# Patient Record
Sex: FEMALE | Race: BLACK OR AFRICAN AMERICAN | NOT HISPANIC OR LATINO | URBAN - METROPOLITAN AREA
[De-identification: names, ages, dates, MRNs, and addresses within clinical notes are randomized per-mention and may not be internally consistent; named-entity substitution may affect disease eponyms.]

---

## 2018-02-15 ENCOUNTER — EMERGENCY (EMERGENCY)
Facility: HOSPITAL | Age: 43
LOS: 1 days | Discharge: HOME | End: 2018-02-15
Attending: EMERGENCY MEDICINE | Admitting: EMERGENCY MEDICINE
Payer: MEDICAID

## 2018-02-15 VITALS
TEMPERATURE: 98 F | RESPIRATION RATE: 16 BRPM | WEIGHT: 164.91 LBS | OXYGEN SATURATION: 98 % | DIASTOLIC BLOOD PRESSURE: 89 MMHG | SYSTOLIC BLOOD PRESSURE: 152 MMHG | HEART RATE: 88 BPM

## 2018-02-15 DIAGNOSIS — R07.9 CHEST PAIN, UNSPECIFIED: ICD-10-CM

## 2018-02-15 DIAGNOSIS — R51 HEADACHE: ICD-10-CM

## 2018-02-15 DIAGNOSIS — R53.1 WEAKNESS: ICD-10-CM

## 2018-02-15 LAB
ALBUMIN SERPL ELPH-MCNC: 4.2 G/DL — SIGNIFICANT CHANGE UP (ref 3.3–5)
ALP SERPL-CCNC: 66 U/L — SIGNIFICANT CHANGE UP (ref 40–120)
ALT FLD-CCNC: 20 U/L — SIGNIFICANT CHANGE UP (ref 10–45)
ANION GAP SERPL CALC-SCNC: 12 MMOL/L — SIGNIFICANT CHANGE UP (ref 5–17)
APPEARANCE UR: CLEAR — SIGNIFICANT CHANGE UP
AST SERPL-CCNC: 22 U/L — SIGNIFICANT CHANGE UP (ref 10–40)
BASOPHILS NFR BLD AUTO: 0.2 % — SIGNIFICANT CHANGE UP (ref 0–2)
BILIRUB SERPL-MCNC: 0.2 MG/DL — SIGNIFICANT CHANGE UP (ref 0.2–1.2)
BILIRUB UR-MCNC: NEGATIVE — SIGNIFICANT CHANGE UP
BUN SERPL-MCNC: 9 MG/DL — SIGNIFICANT CHANGE UP (ref 7–23)
CALCIUM SERPL-MCNC: 9.5 MG/DL — SIGNIFICANT CHANGE UP (ref 8.4–10.5)
CHLORIDE SERPL-SCNC: 98 MMOL/L — SIGNIFICANT CHANGE UP (ref 96–108)
CO2 SERPL-SCNC: 29 MMOL/L — SIGNIFICANT CHANGE UP (ref 22–31)
COLOR SPEC: YELLOW — SIGNIFICANT CHANGE UP
CREAT SERPL-MCNC: 0.77 MG/DL — SIGNIFICANT CHANGE UP (ref 0.5–1.3)
DIFF PNL FLD: (no result)
EOSINOPHIL NFR BLD AUTO: 1.3 % — SIGNIFICANT CHANGE UP (ref 0–6)
GLUCOSE SERPL-MCNC: 254 MG/DL — HIGH (ref 70–99)
GLUCOSE UR QL: 500
HCT VFR BLD CALC: 36.1 % — SIGNIFICANT CHANGE UP (ref 34.5–45)
HGB BLD-MCNC: 11.8 G/DL — SIGNIFICANT CHANGE UP (ref 11.5–15.5)
KETONES UR-MCNC: NEGATIVE — SIGNIFICANT CHANGE UP
LEUKOCYTE ESTERASE UR-ACNC: NEGATIVE — SIGNIFICANT CHANGE UP
LIDOCAIN IGE QN: 31 U/L — SIGNIFICANT CHANGE UP (ref 7–60)
LYMPHOCYTES # BLD AUTO: 43.7 % — SIGNIFICANT CHANGE UP (ref 13–44)
MCHC RBC-ENTMCNC: 28.5 PG — SIGNIFICANT CHANGE UP (ref 27–34)
MCHC RBC-ENTMCNC: 32.7 G/DL — SIGNIFICANT CHANGE UP (ref 32–36)
MCV RBC AUTO: 87.2 FL — SIGNIFICANT CHANGE UP (ref 80–100)
MONOCYTES NFR BLD AUTO: 7.8 % — SIGNIFICANT CHANGE UP (ref 2–14)
NEUTROPHILS NFR BLD AUTO: 47 % — SIGNIFICANT CHANGE UP (ref 43–77)
NITRITE UR-MCNC: NEGATIVE — SIGNIFICANT CHANGE UP
PH UR: 5.5 — SIGNIFICANT CHANGE UP (ref 5–8)
PLATELET # BLD AUTO: 247 K/UL — SIGNIFICANT CHANGE UP (ref 150–400)
POTASSIUM SERPL-MCNC: 4.4 MMOL/L — SIGNIFICANT CHANGE UP (ref 3.5–5.3)
POTASSIUM SERPL-SCNC: 4.4 MMOL/L — SIGNIFICANT CHANGE UP (ref 3.5–5.3)
PROT SERPL-MCNC: 7.9 G/DL — SIGNIFICANT CHANGE UP (ref 6–8.3)
PROT UR-MCNC: NEGATIVE MG/DL — SIGNIFICANT CHANGE UP
RBC # BLD: 4.14 M/UL — SIGNIFICANT CHANGE UP (ref 3.8–5.2)
RBC # FLD: 12.6 % — SIGNIFICANT CHANGE UP (ref 10.3–16.9)
SODIUM SERPL-SCNC: 139 MMOL/L — SIGNIFICANT CHANGE UP (ref 135–145)
SP GR SPEC: 1.02 — SIGNIFICANT CHANGE UP (ref 1–1.03)
UROBILINOGEN FLD QL: 0.2 E.U./DL — SIGNIFICANT CHANGE UP
WBC # BLD: 6.3 K/UL — SIGNIFICANT CHANGE UP (ref 3.8–10.5)
WBC # FLD AUTO: 6.3 K/UL — SIGNIFICANT CHANGE UP (ref 3.8–10.5)

## 2018-02-15 PROCEDURE — 71046 X-RAY EXAM CHEST 2 VIEWS: CPT | Mod: 26

## 2018-02-15 PROCEDURE — 70450 CT HEAD/BRAIN W/O DYE: CPT | Mod: 26

## 2018-02-15 PROCEDURE — 99285 EMERGENCY DEPT VISIT HI MDM: CPT

## 2018-02-15 NOTE — ED PROVIDER NOTE - CHPI ED SYMPTOMS NEG
no back pain/no diaphoresis/no shortness of breath/no nausea/no syncope/no chills/no chest pain/no vomiting/no dizziness/no fever/no cough

## 2018-02-15 NOTE — ED PROVIDER NOTE - MEDICAL DECISION MAKING DETAILS
s/s as above, hds, work up reviewed, bsus w pericardial effusion s/s as above pt states for one year, hds, work up reviewed, bsus w no pericardial effusion given enlarged size. no sx during ED stay. Less likely  acs/dissection/pe, labs wnl. less likely infectious process. Discussed with pt results of work up, strict return precautions, and need for follow up. Pt states she can fu w pcp.  Pt expressed understanding and agrees with plan.

## 2018-02-15 NOTE — ED PROVIDER NOTE - OBJECTIVE STATEMENT
43 yo hx of DM w complaints of 1 year duration of headache, abnormal  sensation to chest that starts in abdomen w generalized weakness. Comes in discrete episodes, felt as if about to syncopize earlier today. No trauma to head, n/v/f/c, cough. 41 yo hx of DM w complaints of 1 year duration of headache, abnormal  sensation to chest that starts in abdomen that goes to chest w generalized weakness. Comes in discrete episodes, felt as if about to syncopize earlier today. No trauma to head, n/v/f/c, cough. Pt has not tried anything for symptoms, no other aggravating or relieving factors. has no sx at present.

## 2018-02-15 NOTE — ED ADULT NURSE NOTE - OBJECTIVE STATEMENT
pt received in [facility] x 3 pt c/o headaches and dizziness for about a year , abdominal pain , no nausea or vomiting

## 2018-02-15 NOTE — ED ADULT TRIAGE NOTE - CHIEF COMPLAINT QUOTE
biba from home c/o intermittent headache, dizziness, "burning feeling in stomach going up her throat" x 4 days.  Hx Gerd, migraine.  EKG in progress.  Patient denies falls / injury, fever, chills, neck stiffness, n/v/d

## 2018-02-26 ENCOUNTER — APPOINTMENT (OUTPATIENT)
Dept: HEART AND VASCULAR | Facility: CLINIC | Age: 43
End: 2018-02-26
Payer: MEDICAID

## 2018-02-26 VITALS
BODY MASS INDEX: 42.32 KG/M2 | HEIGHT: 65 IN | SYSTOLIC BLOOD PRESSURE: 128 MMHG | HEART RATE: 100 BPM | DIASTOLIC BLOOD PRESSURE: 80 MMHG | WEIGHT: 254 LBS | OXYGEN SATURATION: 98 %

## 2018-02-26 DIAGNOSIS — E11.65 TYPE 2 DIABETES MELLITUS WITH UNSPECIFIED COMPLICATIONS: ICD-10-CM

## 2018-02-26 DIAGNOSIS — Z82.49 FAMILY HISTORY OF ISCHEMIC HEART DISEASE AND OTHER DISEASES OF THE CIRCULATORY SYSTEM: ICD-10-CM

## 2018-02-26 DIAGNOSIS — Z83.3 FAMILY HISTORY OF DIABETES MELLITUS: ICD-10-CM

## 2018-02-26 DIAGNOSIS — K21.9 GASTRO-ESOPHAGEAL REFLUX DISEASE W/OUT ESOPHAGITIS: ICD-10-CM

## 2018-02-26 DIAGNOSIS — E11.8 TYPE 2 DIABETES MELLITUS WITH UNSPECIFIED COMPLICATIONS: ICD-10-CM

## 2018-02-26 PROCEDURE — 99204 OFFICE O/P NEW MOD 45 MIN: CPT

## 2018-02-27 PROBLEM — E11.8 TYPE II DIABETES MELLITUS WITH MANIFESTATIONS, UNCONTROLLED: Status: RESOLVED | Noted: 2018-02-27 | Resolved: 2018-02-27

## 2018-02-27 PROBLEM — Z82.49 FAMILY HISTORY OF HYPERTENSION: Status: ACTIVE | Noted: 2018-02-26

## 2018-02-27 PROBLEM — K21.9 CHRONIC GERD: Status: RESOLVED | Noted: 2018-02-27 | Resolved: 2018-02-27

## 2018-02-27 PROBLEM — Z83.3 FAMILY HISTORY OF DIABETES MELLITUS: Status: ACTIVE | Noted: 2018-02-26

## 2018-03-12 ENCOUNTER — EMERGENCY (EMERGENCY)
Facility: HOSPITAL | Age: 43
LOS: 1 days | Discharge: HOME | End: 2018-03-12
Attending: EMERGENCY MEDICINE | Admitting: EMERGENCY MEDICINE
Payer: MEDICAID

## 2018-03-12 VITALS
RESPIRATION RATE: 16 BRPM | TEMPERATURE: 98 F | HEART RATE: 90 BPM | SYSTOLIC BLOOD PRESSURE: 152 MMHG | DIASTOLIC BLOOD PRESSURE: 106 MMHG | OXYGEN SATURATION: 97 %

## 2018-03-12 LAB
GRAM STN FLD: SIGNIFICANT CHANGE UP
SPECIMEN SOURCE: SIGNIFICANT CHANGE UP

## 2018-03-12 PROCEDURE — 99284 EMERGENCY DEPT VISIT MOD MDM: CPT

## 2018-03-12 RX ORDER — AZTREONAM 2 G
1 VIAL (EA) INJECTION
Qty: 19 | Refills: 0 | OUTPATIENT
Start: 2018-03-12 | End: 2018-03-21

## 2018-03-12 RX ORDER — AZTREONAM 2 G
1 VIAL (EA) INJECTION
Qty: 20 | Refills: 0 | OUTPATIENT
Start: 2018-03-12 | End: 2018-03-21

## 2018-03-12 RX ADMIN — Medication 1 TABLET(S): at 10:51

## 2018-03-12 NOTE — ED ADULT NURSE NOTE - OBJECTIVE STATEMENT
Patient BIBA, AAOx3, in NAD, hypertensive, complaining of wound to LUQ of abdomen with serosanguinous drainage.  Patient states she saw her PMD and started PO Cephalexin on 3/6/18.  Patient denies fevers, chills, N/V or any other complaints.  Will continue with plan of care.

## 2018-03-12 NOTE — ED PROVIDER NOTE - ATTENDING CONTRIBUTION TO CARE
Agree with documentation by Dr. Silva, 44 y/o F w/NIDDM, abdominal skin abscess treated unsuccessfully with keflex on day 7 of 10, spontaneously draining w/improvement in pain and size. HEENT wnl, MMM, CTA b/l, RRR, NTND, ext exam wnl, + 4x4cm indurated mid-epigastric abscess spontaneously draining, ultrasounded w/out significant pocket of fluid, will forego further I&D. Starting on bactrim for MRSA coverage, has f/u with PMD on 3/19, given return precautions.

## 2018-03-12 NOTE — ED PROVIDER NOTE - PLAN OF CARE
With purulent drainage; u/s w/o indication of abscess; will broaden to bactrim DS [id number] With purulent drainage; u/s w/o indication of abscess; will broaden to bactrim DS bid [id number]

## 2018-03-12 NOTE — ED PROVIDER NOTE - OBJECTIVE STATEMENT
Patient is a 44yo F w/ PMH DM2 presenting w/ draining purulent abdominal skin rash. Pt reports visiting her PMD for this rash on 3/6/16, and being started on a 10day course of cephalexin 500mg bid. Patient reports swelling has improved since starting abx, however today the rash was significantly more painful (5/10) and w/ yellow-reddish drainage. Denies f/c/n/v/d/c, CP, dyspnea, ab pain, dysuria.

## 2018-03-12 NOTE — ED ADULT TRIAGE NOTE - CHIEF COMPLAINT QUOTE
Pt BIBA from home c/o skin ulcer to RUQ of abdomen, on antibiotics, but states it's red and has purulent drainage. Denies fever.

## 2018-03-12 NOTE — ED PROVIDER NOTE - CARE PLAN
Principal Discharge DX:	Cellulitis of other specified site  Assessment and plan of treatment:	With purulent drainage; u/s w/o indication of abscess; will broaden to bactrim DS [id number] Principal Discharge DX:	Cellulitis of other specified site  Assessment and plan of treatment:	With purulent drainage; u/s w/o indication of abscess; will broaden to bactrim DS bid [id number]

## 2018-03-12 NOTE — ED PROVIDER NOTE - MEDICAL DECISION MAKING DETAILS
Patient is a 44yo F w/ PMH DM2 presenting with abdominal cellulitis w/ purulent drainage; ROS otherwise negative; u/s w/ no discrete abscess identified; broadened pt to bactrim DS x10 days Patient is a 42yo F w/ PMH DM2 presenting with abdominal cellulitis w/ purulent drainage; ROS otherwise negative; u/s w/ no discrete abscess identified; broadened pt to bactrim DS bid x10 days

## 2018-03-15 LAB
CULTURE RESULTS: NO GROWTH — SIGNIFICANT CHANGE UP
SPECIMEN SOURCE: SIGNIFICANT CHANGE UP

## 2018-03-16 DIAGNOSIS — R21 RASH AND OTHER NONSPECIFIC SKIN ERUPTION: ICD-10-CM

## 2018-03-16 DIAGNOSIS — E11.9 TYPE 2 DIABETES MELLITUS WITHOUT COMPLICATIONS: ICD-10-CM

## 2018-03-16 DIAGNOSIS — L03.311 CELLULITIS OF ABDOMINAL WALL: ICD-10-CM

## 2018-03-22 ENCOUNTER — APPOINTMENT (OUTPATIENT)
Dept: HEART AND VASCULAR | Facility: CLINIC | Age: 43
End: 2018-03-22
Payer: MEDICAID

## 2018-03-22 VITALS
DIASTOLIC BLOOD PRESSURE: 68 MMHG | SYSTOLIC BLOOD PRESSURE: 102 MMHG | HEIGHT: 65 IN | OXYGEN SATURATION: 97 % | HEART RATE: 94 BPM | WEIGHT: 254 LBS | BODY MASS INDEX: 42.32 KG/M2

## 2018-03-22 PROCEDURE — 93306 TTE W/DOPPLER COMPLETE: CPT

## 2018-03-22 PROCEDURE — 99214 OFFICE O/P EST MOD 30 MIN: CPT

## 2018-04-16 ENCOUNTER — APPOINTMENT (OUTPATIENT)
Dept: HEART AND VASCULAR | Facility: CLINIC | Age: 43
End: 2018-04-16
Payer: MEDICAID

## 2018-04-16 VITALS
BODY MASS INDEX: 42.32 KG/M2 | DIASTOLIC BLOOD PRESSURE: 80 MMHG | SYSTOLIC BLOOD PRESSURE: 118 MMHG | HEART RATE: 85 BPM | WEIGHT: 254 LBS | HEIGHT: 65 IN

## 2018-04-16 DIAGNOSIS — R00.2 PALPITATIONS: ICD-10-CM

## 2018-04-16 PROCEDURE — 93320 DOPPLER ECHO COMPLETE: CPT

## 2018-04-16 PROCEDURE — 99214 OFFICE O/P EST MOD 30 MIN: CPT

## 2018-04-16 PROCEDURE — 93325 DOPPLER ECHO COLOR FLOW MAPG: CPT

## 2018-04-16 PROCEDURE — 93351 STRESS TTE COMPLETE: CPT

## 2018-06-01 ENCOUNTER — EMERGENCY (EMERGENCY)
Facility: HOSPITAL | Age: 43
LOS: 1 days | Discharge: HOME | End: 2018-06-01
Attending: EMERGENCY MEDICINE | Admitting: EMERGENCY MEDICINE
Payer: MEDICAID

## 2018-06-01 VITALS
OXYGEN SATURATION: 97 % | SYSTOLIC BLOOD PRESSURE: 125 MMHG | HEART RATE: 97 BPM | TEMPERATURE: 98 F | DIASTOLIC BLOOD PRESSURE: 85 MMHG | RESPIRATION RATE: 18 BRPM

## 2018-06-01 VITALS
DIASTOLIC BLOOD PRESSURE: 83 MMHG | RESPIRATION RATE: 18 BRPM | TEMPERATURE: 99 F | HEART RATE: 77 BPM | SYSTOLIC BLOOD PRESSURE: 124 MMHG | OXYGEN SATURATION: 100 %

## 2018-06-01 DIAGNOSIS — Z90.49 ACQUIRED ABSENCE OF OTHER SPECIFIED PARTS OF DIGESTIVE TRACT: Chronic | ICD-10-CM

## 2018-06-01 LAB
ALBUMIN SERPL ELPH-MCNC: 4.1 G/DL — SIGNIFICANT CHANGE UP (ref 3.3–5)
ALP SERPL-CCNC: 65 U/L — SIGNIFICANT CHANGE UP (ref 40–120)
ALT FLD-CCNC: 18 U/L — SIGNIFICANT CHANGE UP (ref 10–45)
ANION GAP SERPL CALC-SCNC: 13 MMOL/L — SIGNIFICANT CHANGE UP (ref 5–17)
APTT BLD: 32.5 SEC — SIGNIFICANT CHANGE UP (ref 27.5–37.4)
AST SERPL-CCNC: 17 U/L — SIGNIFICANT CHANGE UP (ref 10–40)
BASOPHILS NFR BLD AUTO: 0.2 % — SIGNIFICANT CHANGE UP (ref 0–2)
BILIRUB SERPL-MCNC: 0.2 MG/DL — SIGNIFICANT CHANGE UP (ref 0.2–1.2)
BUN SERPL-MCNC: 8 MG/DL — SIGNIFICANT CHANGE UP (ref 7–23)
CALCIUM SERPL-MCNC: 9.5 MG/DL — SIGNIFICANT CHANGE UP (ref 8.4–10.5)
CHLORIDE SERPL-SCNC: 95 MMOL/L — LOW (ref 96–108)
CO2 SERPL-SCNC: 28 MMOL/L — SIGNIFICANT CHANGE UP (ref 22–31)
CREAT SERPL-MCNC: 0.61 MG/DL — SIGNIFICANT CHANGE UP (ref 0.5–1.3)
EOSINOPHIL NFR BLD AUTO: 1.2 % — SIGNIFICANT CHANGE UP (ref 0–6)
GLUCOSE SERPL-MCNC: 227 MG/DL — HIGH (ref 70–99)
HCG SERPL-ACNC: <.1 MIU/ML — SIGNIFICANT CHANGE UP
HCT VFR BLD CALC: 37.6 % — SIGNIFICANT CHANGE UP (ref 34.5–45)
HGB BLD-MCNC: 12.2 G/DL — SIGNIFICANT CHANGE UP (ref 11.5–15.5)
INR BLD: 1 — SIGNIFICANT CHANGE UP (ref 0.88–1.16)
LYMPHOCYTES # BLD AUTO: 34.8 % — SIGNIFICANT CHANGE UP (ref 13–44)
MAGNESIUM SERPL-MCNC: 1.9 MG/DL — SIGNIFICANT CHANGE UP (ref 1.6–2.6)
MCHC RBC-ENTMCNC: 28.3 PG — SIGNIFICANT CHANGE UP (ref 27–34)
MCHC RBC-ENTMCNC: 32.4 G/DL — SIGNIFICANT CHANGE UP (ref 32–36)
MCV RBC AUTO: 87.2 FL — SIGNIFICANT CHANGE UP (ref 80–100)
MONOCYTES NFR BLD AUTO: 8.3 % — SIGNIFICANT CHANGE UP (ref 2–14)
NEUTROPHILS NFR BLD AUTO: 55.5 % — SIGNIFICANT CHANGE UP (ref 43–77)
PLATELET # BLD AUTO: 222 K/UL — SIGNIFICANT CHANGE UP (ref 150–400)
POTASSIUM SERPL-MCNC: 4.3 MMOL/L — SIGNIFICANT CHANGE UP (ref 3.5–5.3)
POTASSIUM SERPL-SCNC: 4.3 MMOL/L — SIGNIFICANT CHANGE UP (ref 3.5–5.3)
PROT SERPL-MCNC: 7.9 G/DL — SIGNIFICANT CHANGE UP (ref 6–8.3)
PROTHROM AB SERPL-ACNC: 11.1 SEC — SIGNIFICANT CHANGE UP (ref 9.8–12.7)
RBC # BLD: 4.31 M/UL — SIGNIFICANT CHANGE UP (ref 3.8–5.2)
RBC # FLD: 12.3 % — SIGNIFICANT CHANGE UP (ref 10.3–16.9)
SODIUM SERPL-SCNC: 136 MMOL/L — SIGNIFICANT CHANGE UP (ref 135–145)
WBC # BLD: 6.5 K/UL — SIGNIFICANT CHANGE UP (ref 3.8–10.5)
WBC # FLD AUTO: 6.5 K/UL — SIGNIFICANT CHANGE UP (ref 3.8–10.5)

## 2018-06-01 PROCEDURE — 99284 EMERGENCY DEPT VISIT MOD MDM: CPT | Mod: 25

## 2018-06-01 PROCEDURE — 70551 MRI BRAIN STEM W/O DYE: CPT | Mod: 26

## 2018-06-01 RX ORDER — DIPHENHYDRAMINE HCL 50 MG
25 CAPSULE ORAL ONCE
Qty: 0 | Refills: 0 | Status: DISCONTINUED | OUTPATIENT
Start: 2018-06-01 | End: 2018-06-01

## 2018-06-01 RX ORDER — METOCLOPRAMIDE HCL 10 MG
10 TABLET ORAL ONCE
Qty: 0 | Refills: 0 | Status: DISCONTINUED | OUTPATIENT
Start: 2018-06-01 | End: 2018-06-01

## 2018-06-01 RX ORDER — KETOROLAC TROMETHAMINE 30 MG/ML
30 SYRINGE (ML) INJECTION ONCE
Qty: 0 | Refills: 0 | Status: DISCONTINUED | OUTPATIENT
Start: 2018-06-01 | End: 2018-06-01

## 2018-06-01 NOTE — ED ADULT NURSE NOTE - OBJECTIVE STATEMENT
The patient is a 44 y/o female who came in to ED c/o "head pressure" Pt denies headache. Pt c/o tremors. Pt denies nausea, photophobia, weakness or any other complaints.

## 2018-06-01 NOTE — ED ADULT TRIAGE NOTE - ARRIVAL INFO ADDITIONAL COMMENTS
pt c/o headache for last few days.  pt has had intermittent headaches for last several months and is being worked up.  +photophobia.  no n/v.  pt had episode of tremors witnessed by ems.

## 2018-06-01 NOTE — ED PROVIDER NOTE - OBJECTIVE STATEMENT
43F hx DM, c/o head pressure. pt states has had pressure to head for past year intermittently.  no n/v/d. no fevers. occasional dizziness. no chest pain. no SOB.  pt states tonight pressure started to top of her head and then she had tremors to her face and then her arms. states she was awake the whole time but could not control it.  states lasted ~2mins.  no vision changes.  no numbness/weakness. states has had CTs in past that have been normal.  states she is waiting for [facility] approval to see a neurologist.

## 2018-06-01 NOTE — ED PROVIDER NOTE - MEDICAL DECISION MAKING DETAILS
HA, no focal neuro deficits currently, tremor earlier with pressure, has had negative CT in past, pt concerned  -check labs, MRI. pt does not wish to take any medication currently HA, no focal neuro deficits currently, tremor earlier with pressure, has had negative CT in past, pt concerned  -check labs, MRI. pt does not wish to take any medication currently    [address] - Pt reassessed after MRI and feeling improved. Reviewed MRI results with patient and there is no evidence of acute intracranial abnormality. Pt provided with copies of results. Will f/u with Neurology and numbers provided. Strict return precautions given.

## 2018-06-05 DIAGNOSIS — E11.9 TYPE 2 DIABETES MELLITUS WITHOUT COMPLICATIONS: ICD-10-CM

## 2018-06-05 DIAGNOSIS — R51 HEADACHE: ICD-10-CM

## 2018-06-05 DIAGNOSIS — Z88.0 ALLERGY STATUS TO PENICILLIN: ICD-10-CM

## 2018-06-05 DIAGNOSIS — Z79.2 LONG TERM (CURRENT) USE OF ANTIBIOTICS: ICD-10-CM

## 2018-07-13 ENCOUNTER — APPOINTMENT (OUTPATIENT)
Dept: NEUROLOGY | Facility: CLINIC | Age: 43
End: 2018-07-13
Payer: MEDICAID

## 2018-07-13 VITALS
DIASTOLIC BLOOD PRESSURE: 84 MMHG | BODY MASS INDEX: 41.65 KG/M2 | HEIGHT: 65 IN | OXYGEN SATURATION: 96 % | WEIGHT: 250 LBS | TEMPERATURE: 98.5 F | HEART RATE: 85 BPM | SYSTOLIC BLOOD PRESSURE: 127 MMHG

## 2018-07-13 PROCEDURE — 99205 OFFICE O/P NEW HI 60 MIN: CPT

## 2018-07-22 ENCOUNTER — EMERGENCY (EMERGENCY)
Facility: HOSPITAL | Age: 43
LOS: 1 days | Discharge: HOME | End: 2018-07-22
Attending: EMERGENCY MEDICINE | Admitting: EMERGENCY MEDICINE
Payer: MEDICAID

## 2018-07-22 VITALS
RESPIRATION RATE: 18 BRPM | HEART RATE: 74 BPM | OXYGEN SATURATION: 100 % | SYSTOLIC BLOOD PRESSURE: 142 MMHG | DIASTOLIC BLOOD PRESSURE: 84 MMHG | TEMPERATURE: 98 F

## 2018-07-22 VITALS
RESPIRATION RATE: 18 BRPM | DIASTOLIC BLOOD PRESSURE: 92 MMHG | HEART RATE: 76 BPM | TEMPERATURE: 98 F | SYSTOLIC BLOOD PRESSURE: 167 MMHG | OXYGEN SATURATION: 99 %

## 2018-07-22 DIAGNOSIS — Z90.49 ACQUIRED ABSENCE OF OTHER SPECIFIED PARTS OF DIGESTIVE TRACT: Chronic | ICD-10-CM

## 2018-07-22 LAB
ALBUMIN SERPL ELPH-MCNC: 4 G/DL — SIGNIFICANT CHANGE UP (ref 3.3–5)
ALP SERPL-CCNC: 67 U/L — SIGNIFICANT CHANGE UP (ref 40–120)
ALT FLD-CCNC: 20 U/L — SIGNIFICANT CHANGE UP (ref 10–45)
ANION GAP SERPL CALC-SCNC: 13 MMOL/L — SIGNIFICANT CHANGE UP (ref 5–17)
APTT BLD: 29.4 SEC — SIGNIFICANT CHANGE UP (ref 27.5–37.4)
AST SERPL-CCNC: 20 U/L — SIGNIFICANT CHANGE UP (ref 10–40)
BASOPHILS NFR BLD AUTO: 0.2 % — SIGNIFICANT CHANGE UP (ref 0–2)
BILIRUB SERPL-MCNC: 0.3 MG/DL — SIGNIFICANT CHANGE UP (ref 0.2–1.2)
BUN SERPL-MCNC: 7 MG/DL — SIGNIFICANT CHANGE UP (ref 7–23)
CALCIUM SERPL-MCNC: 9 MG/DL — SIGNIFICANT CHANGE UP (ref 8.4–10.5)
CHLORIDE SERPL-SCNC: 99 MMOL/L — SIGNIFICANT CHANGE UP (ref 96–108)
CHOLEST SERPL-MCNC: 182 MG/DL — SIGNIFICANT CHANGE UP (ref 10–199)
CO2 SERPL-SCNC: 26 MMOL/L — SIGNIFICANT CHANGE UP (ref 22–31)
CREAT SERPL-MCNC: 0.61 MG/DL — SIGNIFICANT CHANGE UP (ref 0.5–1.3)
EOSINOPHIL NFR BLD AUTO: 2.1 % — SIGNIFICANT CHANGE UP (ref 0–6)
GLUCOSE SERPL-MCNC: 169 MG/DL — HIGH (ref 70–99)
HCT VFR BLD CALC: 39.5 % — SIGNIFICANT CHANGE UP (ref 34.5–45)
HDLC SERPL-MCNC: 46 MG/DL — SIGNIFICANT CHANGE UP (ref 40–125)
HGB BLD-MCNC: 12.7 G/DL — SIGNIFICANT CHANGE UP (ref 11.5–15.5)
INR BLD: 1.01 — SIGNIFICANT CHANGE UP (ref 0.88–1.16)
LIPID PNL WITH DIRECT LDL SERPL: 111 MG/DL — SIGNIFICANT CHANGE UP
LYMPHOCYTES # BLD AUTO: 51.8 % — HIGH (ref 13–44)
MCHC RBC-ENTMCNC: 28.5 PG — SIGNIFICANT CHANGE UP (ref 27–34)
MCHC RBC-ENTMCNC: 32.2 G/DL — SIGNIFICANT CHANGE UP (ref 32–36)
MCV RBC AUTO: 88.8 FL — SIGNIFICANT CHANGE UP (ref 80–100)
MONOCYTES NFR BLD AUTO: 8.2 % — SIGNIFICANT CHANGE UP (ref 2–14)
NEUTROPHILS NFR BLD AUTO: 37.7 % — LOW (ref 43–77)
PLATELET # BLD AUTO: 214 K/UL — SIGNIFICANT CHANGE UP (ref 150–400)
POTASSIUM SERPL-MCNC: 4 MMOL/L — SIGNIFICANT CHANGE UP (ref 3.5–5.3)
POTASSIUM SERPL-SCNC: 4 MMOL/L — SIGNIFICANT CHANGE UP (ref 3.5–5.3)
PROT SERPL-MCNC: 8 G/DL — SIGNIFICANT CHANGE UP (ref 6–8.3)
PROTHROM AB SERPL-ACNC: 11.2 SEC — SIGNIFICANT CHANGE UP (ref 9.8–12.7)
RBC # BLD: 4.45 M/UL — SIGNIFICANT CHANGE UP (ref 3.8–5.2)
RBC # FLD: 12.6 % — SIGNIFICANT CHANGE UP (ref 10.3–16.9)
SODIUM SERPL-SCNC: 138 MMOL/L — SIGNIFICANT CHANGE UP (ref 135–145)
TOTAL CHOLESTEROL/HDL RATIO MEASUREMENT: 4 RATIO — SIGNIFICANT CHANGE UP (ref 3.3–7.1)
TRIGL SERPL-MCNC: 124 MG/DL — SIGNIFICANT CHANGE UP (ref 10–149)
WBC # BLD: 5.4 K/UL — SIGNIFICANT CHANGE UP (ref 3.8–10.5)
WBC # FLD AUTO: 5.4 K/UL — SIGNIFICANT CHANGE UP (ref 3.8–10.5)

## 2018-07-22 PROCEDURE — 80053 COMPREHEN METABOLIC PANEL: CPT

## 2018-07-22 PROCEDURE — 70498 CT ANGIOGRAPHY NECK: CPT | Mod: 26

## 2018-07-22 PROCEDURE — 70498 CT ANGIOGRAPHY NECK: CPT

## 2018-07-22 PROCEDURE — 82962 GLUCOSE BLOOD TEST: CPT

## 2018-07-22 PROCEDURE — 80061 LIPID PANEL: CPT

## 2018-07-22 PROCEDURE — 70496 CT ANGIOGRAPHY HEAD: CPT | Mod: 26

## 2018-07-22 PROCEDURE — 99291 CRITICAL CARE FIRST HOUR: CPT

## 2018-07-22 PROCEDURE — 99291 CRITICAL CARE FIRST HOUR: CPT | Mod: 25

## 2018-07-22 PROCEDURE — 85610 PROTHROMBIN TIME: CPT

## 2018-07-22 PROCEDURE — 0042T: CPT

## 2018-07-22 PROCEDURE — 36415 COLL VENOUS BLD VENIPUNCTURE: CPT

## 2018-07-22 PROCEDURE — 71045 X-RAY EXAM CHEST 1 VIEW: CPT | Mod: 26

## 2018-07-22 PROCEDURE — 85025 COMPLETE CBC W/AUTO DIFF WBC: CPT

## 2018-07-22 PROCEDURE — 85730 THROMBOPLASTIN TIME PARTIAL: CPT

## 2018-07-22 PROCEDURE — 70450 CT HEAD/BRAIN W/O DYE: CPT | Mod: 26,59

## 2018-07-22 PROCEDURE — 70496 CT ANGIOGRAPHY HEAD: CPT

## 2018-07-22 PROCEDURE — 93010 ELECTROCARDIOGRAM REPORT: CPT

## 2018-07-22 PROCEDURE — 70450 CT HEAD/BRAIN W/O DYE: CPT

## 2018-07-22 PROCEDURE — 71045 X-RAY EXAM CHEST 1 VIEW: CPT

## 2018-07-22 PROCEDURE — 93005 ELECTROCARDIOGRAM TRACING: CPT

## 2018-07-22 NOTE — ED ADULT NURSE REASSESSMENT NOTE - NS ED NURSE REASSESS COMMENT FT1
Patient resting comfortably in stretcher, states symptoms are improving at this time. On cardiac monitor, VSS.  passed dysphagia screen.

## 2018-07-22 NOTE — CONSULT NOTE ADULT - ASSESSMENT
#Stroke Code  -NIHSS 1  -Last normal: 1:40PM  -CT, CTA, perfusion study: unremarkable  -Pt is scheduled to have outpatient EEG monitoring  -Clinical, neurological and radiographic presentation isn't consistent with acute CVA. No TPA given.

## 2018-07-22 NOTE — ED ADULT NURSE NOTE - CHPI ED SYMPTOMS NEG
no weakness/no change in level of consciousness/no vomiting/no confusion/no dizziness/no fever/no loss of consciousness/no nausea/no numbness/no blurred vision

## 2018-07-22 NOTE — ED PROVIDER NOTE - MEDICAL DECISION MAKING DETAILS
pt with left facial paresthesia/heaviness starting at 1:30 today, code stroke called in ED - CT head, cta done without focal findings, seen by stroke who states pt may be discharged with outpt follow up with Dr. Bose.  Pt symptoms improved in ED.  Labs, xray, ekg otherwise wnl.

## 2018-07-22 NOTE — ED ADULT NURSE NOTE - OBJECTIVE STATEMENT
Patient presents to the ED with left sided facial "Heaviness" and left arm tingling that started around 1340 this afternoon.  denies and head pain, dizziness.   States she has had similar symptoms a little while ago and followed up with neurology, however today it is different.  AA&OX3, respirations unlabored, non-diaphoretic, no pallor.  no facial droop, no weakness in extremities, steady gait.  Stroke code initiated at 1420

## 2018-07-22 NOTE — ED ADULT TRIAGE NOTE - OTHER COMPLAINTS
Pt also reports that pt was feeling dizzy around 10AM this morning and it resolved without treatment. As per pt, onset of heaviness and tingling sensation was 13:40PM. Pt is A&O x3, able to speak coherently, no facial droop, no arm drift.

## 2018-07-22 NOTE — ED PROVIDER NOTE - CRITICAL CARE PROVIDED
telephone consultation with the patient's family/consultation with other physicians/conducted a detailed discussion of DNR status/additional history taking/interpretation of diagnostic studies/direct patient care (not related to procedure)/documentation/consult w/ pt's family directly relating to pts condition

## 2018-07-22 NOTE — ED PROVIDER NOTE - OBJECTIVE STATEMENT
42 y/o f with pmh of dm on metformin presents with left sided facial heaviness/numbness and left arm numbness starting at 1:30 today.  Complains of slight headache.  Denies cp or dyspnea.  No leg involvement.  Pt seen in ED one month prior for neurological complaints including b/l leg numbness and had MRI which showed no acute abnormality.  Pt followed up with Dr. Bose one week prior and was scheduled for EEG.

## 2018-07-26 DIAGNOSIS — Z79.899 OTHER LONG TERM (CURRENT) DRUG THERAPY: ICD-10-CM

## 2018-07-26 DIAGNOSIS — R29.701 NIHSS SCORE 1: ICD-10-CM

## 2018-07-26 DIAGNOSIS — Z88.0 ALLERGY STATUS TO PENICILLIN: ICD-10-CM

## 2018-07-26 DIAGNOSIS — R20.2 PARESTHESIA OF SKIN: ICD-10-CM

## 2018-07-26 DIAGNOSIS — E11.9 TYPE 2 DIABETES MELLITUS WITHOUT COMPLICATIONS: ICD-10-CM

## 2018-07-26 DIAGNOSIS — Z87.891 PERSONAL HISTORY OF NICOTINE DEPENDENCE: ICD-10-CM

## 2018-07-26 DIAGNOSIS — R20.0 ANESTHESIA OF SKIN: ICD-10-CM

## 2018-07-30 ENCOUNTER — EMERGENCY (EMERGENCY)
Facility: HOSPITAL | Age: 43
LOS: 1 days | Discharge: HOME | End: 2018-07-30
Attending: EMERGENCY MEDICINE | Admitting: EMERGENCY MEDICINE
Payer: MEDICAID

## 2018-07-30 VITALS
DIASTOLIC BLOOD PRESSURE: 77 MMHG | TEMPERATURE: 98 F | SYSTOLIC BLOOD PRESSURE: 124 MMHG | RESPIRATION RATE: 19 BRPM | OXYGEN SATURATION: 98 % | HEART RATE: 69 BPM

## 2018-07-30 VITALS
DIASTOLIC BLOOD PRESSURE: 81 MMHG | SYSTOLIC BLOOD PRESSURE: 128 MMHG | RESPIRATION RATE: 18 BRPM | HEART RATE: 78 BPM | TEMPERATURE: 98 F | OXYGEN SATURATION: 98 %

## 2018-07-30 DIAGNOSIS — Z88.0 ALLERGY STATUS TO PENICILLIN: ICD-10-CM

## 2018-07-30 DIAGNOSIS — Z79.899 OTHER LONG TERM (CURRENT) DRUG THERAPY: ICD-10-CM

## 2018-07-30 DIAGNOSIS — Z90.49 ACQUIRED ABSENCE OF OTHER SPECIFIED PARTS OF DIGESTIVE TRACT: Chronic | ICD-10-CM

## 2018-07-30 DIAGNOSIS — R10.13 EPIGASTRIC PAIN: ICD-10-CM

## 2018-07-30 LAB
ALBUMIN SERPL ELPH-MCNC: 4.2 G/DL — SIGNIFICANT CHANGE UP (ref 3.3–5)
ALP SERPL-CCNC: 64 U/L — SIGNIFICANT CHANGE UP (ref 40–120)
ALT FLD-CCNC: 16 U/L — SIGNIFICANT CHANGE UP (ref 10–45)
ANION GAP SERPL CALC-SCNC: 13 MMOL/L — SIGNIFICANT CHANGE UP (ref 5–17)
APPEARANCE UR: CLEAR — SIGNIFICANT CHANGE UP
AST SERPL-CCNC: 16 U/L — SIGNIFICANT CHANGE UP (ref 10–40)
BASOPHILS NFR BLD AUTO: 0.2 % — SIGNIFICANT CHANGE UP (ref 0–2)
BILIRUB SERPL-MCNC: 0.4 MG/DL — SIGNIFICANT CHANGE UP (ref 0.2–1.2)
BILIRUB UR-MCNC: NEGATIVE — SIGNIFICANT CHANGE UP
BUN SERPL-MCNC: 9 MG/DL — SIGNIFICANT CHANGE UP (ref 7–23)
CALCIUM SERPL-MCNC: 9.8 MG/DL — SIGNIFICANT CHANGE UP (ref 8.4–10.5)
CHLORIDE SERPL-SCNC: 95 MMOL/L — LOW (ref 96–108)
CO2 SERPL-SCNC: 28 MMOL/L — SIGNIFICANT CHANGE UP (ref 22–31)
COLOR SPEC: YELLOW — SIGNIFICANT CHANGE UP
CREAT SERPL-MCNC: 0.69 MG/DL — SIGNIFICANT CHANGE UP (ref 0.5–1.3)
DIFF PNL FLD: NEGATIVE — SIGNIFICANT CHANGE UP
EOSINOPHIL NFR BLD AUTO: 1.8 % — SIGNIFICANT CHANGE UP (ref 0–6)
GLUCOSE SERPL-MCNC: 235 MG/DL — HIGH (ref 70–99)
GLUCOSE UR QL: 100
HCT VFR BLD CALC: 37.4 % — SIGNIFICANT CHANGE UP (ref 34.5–45)
HGB BLD-MCNC: 12 G/DL — SIGNIFICANT CHANGE UP (ref 11.5–15.5)
KETONES UR-MCNC: NEGATIVE — SIGNIFICANT CHANGE UP
LEUKOCYTE ESTERASE UR-ACNC: NEGATIVE — SIGNIFICANT CHANGE UP
LIDOCAIN IGE QN: 25 U/L — SIGNIFICANT CHANGE UP (ref 7–60)
LYMPHOCYTES # BLD AUTO: 37.7 % — SIGNIFICANT CHANGE UP (ref 13–44)
MCHC RBC-ENTMCNC: 27.9 PG — SIGNIFICANT CHANGE UP (ref 27–34)
MCHC RBC-ENTMCNC: 32.1 G/DL — SIGNIFICANT CHANGE UP (ref 32–36)
MCV RBC AUTO: 87 FL — SIGNIFICANT CHANGE UP (ref 80–100)
MONOCYTES NFR BLD AUTO: 7.5 % — SIGNIFICANT CHANGE UP (ref 2–14)
NEUTROPHILS NFR BLD AUTO: 52.8 % — SIGNIFICANT CHANGE UP (ref 43–77)
NITRITE UR-MCNC: NEGATIVE — SIGNIFICANT CHANGE UP
PH UR: 6 — SIGNIFICANT CHANGE UP (ref 5–8)
PLATELET # BLD AUTO: 226 K/UL — SIGNIFICANT CHANGE UP (ref 150–400)
POTASSIUM SERPL-MCNC: 4 MMOL/L — SIGNIFICANT CHANGE UP (ref 3.5–5.3)
POTASSIUM SERPL-SCNC: 4 MMOL/L — SIGNIFICANT CHANGE UP (ref 3.5–5.3)
PROT SERPL-MCNC: 8.2 G/DL — SIGNIFICANT CHANGE UP (ref 6–8.3)
PROT UR-MCNC: NEGATIVE MG/DL — SIGNIFICANT CHANGE UP
RBC # BLD: 4.3 M/UL — SIGNIFICANT CHANGE UP (ref 3.8–5.2)
RBC # FLD: 12.6 % — SIGNIFICANT CHANGE UP (ref 10.3–16.9)
SODIUM SERPL-SCNC: 136 MMOL/L — SIGNIFICANT CHANGE UP (ref 135–145)
SP GR SPEC: 1.02 — SIGNIFICANT CHANGE UP (ref 1–1.03)
UROBILINOGEN FLD QL: 0.2 E.U./DL — SIGNIFICANT CHANGE UP
WBC # BLD: 6.5 K/UL — SIGNIFICANT CHANGE UP (ref 3.8–10.5)
WBC # FLD AUTO: 6.5 K/UL — SIGNIFICANT CHANGE UP (ref 3.8–10.5)

## 2018-07-30 PROCEDURE — 93005 ELECTROCARDIOGRAM TRACING: CPT

## 2018-07-30 PROCEDURE — 99283 EMERGENCY DEPT VISIT LOW MDM: CPT | Mod: 25

## 2018-07-30 PROCEDURE — 84702 CHORIONIC GONADOTROPIN TEST: CPT

## 2018-07-30 PROCEDURE — 85025 COMPLETE CBC W/AUTO DIFF WBC: CPT

## 2018-07-30 PROCEDURE — 83690 ASSAY OF LIPASE: CPT

## 2018-07-30 PROCEDURE — 36415 COLL VENOUS BLD VENIPUNCTURE: CPT

## 2018-07-30 PROCEDURE — 99284 EMERGENCY DEPT VISIT MOD MDM: CPT | Mod: 25

## 2018-07-30 PROCEDURE — 81003 URINALYSIS AUTO W/O SCOPE: CPT

## 2018-07-30 PROCEDURE — 93010 ELECTROCARDIOGRAM REPORT: CPT

## 2018-07-30 PROCEDURE — 80053 COMPREHEN METABOLIC PANEL: CPT

## 2018-07-30 RX ORDER — FAMOTIDINE 10 MG/ML
20 INJECTION INTRAVENOUS ONCE
Qty: 0 | Refills: 0 | Status: COMPLETED | OUTPATIENT
Start: 2018-07-30 | End: 2018-07-30

## 2018-07-30 RX ORDER — LIDOCAINE 4 G/100G
10 CREAM TOPICAL ONCE
Qty: 0 | Refills: 0 | Status: COMPLETED | OUTPATIENT
Start: 2018-07-30 | End: 2018-07-30

## 2018-07-30 RX ADMIN — Medication 30 MILLILITER(S): at 02:25

## 2018-07-30 NOTE — ED PROVIDER NOTE - MEDICAL DECISION MAKING DETAILS
44 yo F with pmh of DM and GERD c/o contractions in stomach that radiate to her head. Pt  states she feels contractions in the epigastrium and then gets a burning sensation in her head. Pt states symptoms have been going on for "a while" and have been getting worse. No n/v, fever, chills or urinary symptoms. VSS. Abd soft, nt, nd. 44 yo F with pmh of DM and GERD c/o contractions in stomach that radiate to her head. Pt  states she feels contractions in the epigastrium and then gets a burning sensation in her head. Pt states symptoms have been going on for "a while" and have been getting worse. No n/v, fever, chills or urinary symptoms. VSS. Abd soft, nt, nd. No concern for acute pathology, ?GERD. Check labs, GI cocktail,  if normal refer for endoscopy

## 2018-07-30 NOTE — ED PROVIDER NOTE - ATTENDING CONTRIBUTION TO CARE
recurrence of abdominal pain radiating up to head.  burning in nature.  seen here before for similar and other workups non diagnostic.  wants to know etiology.  prior imaging of head normal (ct, mri) reviewed.  well appearing, non focal neuro exam, vitals stable.  repeat labs unremarkable.  plan treatment for possible gerd/ gastritis and f/u outpatient

## 2018-07-30 NOTE — ED ADULT NURSE NOTE - OBJECTIVE STATEMENT
pt. presented with c/o abdominal pain, pt. presented with c/o abdominal pain radiating to back, squeezing/cramping in nature, pt. denies n/v/d, fever, chills, difficulty breathing, blood in stool or urine.

## 2018-07-30 NOTE — ED PROVIDER NOTE - OBJECTIVE STATEMENT
44 yo F with pmh of DM and GERD c/o contractions in stomach that radiate to her head. Pt  states she feels contractions in the epigastrium and then gets a burning sensation in her head. Pt states symptoms have been going on for "a while" and have been getting worse. Associated with generalized abd pressure. Pt has had a brain MRI in June that was normal. Pt also seen on 7/22 and had a negative stroke workup. Pt is supposed to have an EEG with neurology and endoscopy. Abd lisseth is worse with eating and associated with epigastric burning. Pt taking pepcid with no relief. Denies fever, chills, n/v, back pain, dysuria, hematuria, dizziness, focal weakness.

## 2018-07-30 NOTE — ED ADULT NURSE NOTE - CHPI ED SYMPTOMS NEG
no diarrhea/no chills/no vomiting/no dysuria/no hematuria/no nausea/no abdominal distension/no fever/no blood in stool/no burning urination

## 2018-08-03 ENCOUNTER — APPOINTMENT (OUTPATIENT)
Dept: NEUROLOGY | Facility: CLINIC | Age: 43
End: 2018-08-03
Payer: MEDICAID

## 2018-08-03 PROCEDURE — 95819 EEG AWAKE AND ASLEEP: CPT

## 2018-08-04 PROCEDURE — 95953: CPT

## 2018-08-05 ENCOUNTER — APPOINTMENT (OUTPATIENT)
Dept: NEUROLOGY | Facility: CLINIC | Age: 43
End: 2018-08-05

## 2018-08-05 PROCEDURE — 95953: CPT

## 2018-08-13 ENCOUNTER — APPOINTMENT (OUTPATIENT)
Dept: HEART AND VASCULAR | Facility: CLINIC | Age: 43
End: 2018-08-13

## 2018-09-28 ENCOUNTER — APPOINTMENT (OUTPATIENT)
Dept: NEUROLOGY | Facility: CLINIC | Age: 43
End: 2018-09-28

## 2018-10-14 ENCOUNTER — EMERGENCY (EMERGENCY)
Facility: HOSPITAL | Age: 43
LOS: 1 days | Discharge: HOME | End: 2018-10-14
Attending: EMERGENCY MEDICINE | Admitting: EMERGENCY MEDICINE
Payer: MEDICAID

## 2018-10-14 VITALS
DIASTOLIC BLOOD PRESSURE: 93 MMHG | TEMPERATURE: 99 F | OXYGEN SATURATION: 98 % | HEART RATE: 82 BPM | HEIGHT: 65 IN | WEIGHT: 244.93 LBS | RESPIRATION RATE: 18 BRPM | SYSTOLIC BLOOD PRESSURE: 146 MMHG

## 2018-10-14 VITALS
TEMPERATURE: 99 F | OXYGEN SATURATION: 99 % | HEART RATE: 81 BPM | SYSTOLIC BLOOD PRESSURE: 140 MMHG | RESPIRATION RATE: 16 BRPM | DIASTOLIC BLOOD PRESSURE: 87 MMHG

## 2018-10-14 DIAGNOSIS — Z90.49 ACQUIRED ABSENCE OF OTHER SPECIFIED PARTS OF DIGESTIVE TRACT: Chronic | ICD-10-CM

## 2018-10-14 LAB
ALBUMIN SERPL ELPH-MCNC: 4.2 G/DL — SIGNIFICANT CHANGE UP (ref 3.3–5)
ALP SERPL-CCNC: 68 U/L — SIGNIFICANT CHANGE UP (ref 40–120)
ALT FLD-CCNC: 20 U/L — SIGNIFICANT CHANGE UP (ref 10–45)
ANION GAP SERPL CALC-SCNC: 11 MMOL/L — SIGNIFICANT CHANGE UP (ref 5–17)
APTT BLD: 31.5 SEC — SIGNIFICANT CHANGE UP (ref 27.5–37.4)
AST SERPL-CCNC: 18 U/L — SIGNIFICANT CHANGE UP (ref 10–40)
BASOPHILS NFR BLD AUTO: 0.1 % — SIGNIFICANT CHANGE UP (ref 0–2)
BILIRUB SERPL-MCNC: 0.3 MG/DL — SIGNIFICANT CHANGE UP (ref 0.2–1.2)
BUN SERPL-MCNC: 8 MG/DL — SIGNIFICANT CHANGE UP (ref 7–23)
CALCIUM SERPL-MCNC: 9.7 MG/DL — SIGNIFICANT CHANGE UP (ref 8.4–10.5)
CHLORIDE SERPL-SCNC: 100 MMOL/L — SIGNIFICANT CHANGE UP (ref 96–108)
CK MB CFR SERPL CALC: <1 NG/ML — SIGNIFICANT CHANGE UP (ref 0–6.7)
CK SERPL-CCNC: 62 U/L — SIGNIFICANT CHANGE UP (ref 25–170)
CO2 SERPL-SCNC: 29 MMOL/L — SIGNIFICANT CHANGE UP (ref 22–31)
CREAT SERPL-MCNC: 0.72 MG/DL — SIGNIFICANT CHANGE UP (ref 0.5–1.3)
EOSINOPHIL NFR BLD AUTO: 1.3 % — SIGNIFICANT CHANGE UP (ref 0–6)
GLUCOSE SERPL-MCNC: 172 MG/DL — HIGH (ref 70–99)
HCT VFR BLD CALC: 37.7 % — SIGNIFICANT CHANGE UP (ref 34.5–45)
HGB BLD-MCNC: 12 G/DL — SIGNIFICANT CHANGE UP (ref 11.5–15.5)
INR BLD: 1.01 — SIGNIFICANT CHANGE UP (ref 0.88–1.16)
LYMPHOCYTES # BLD AUTO: 36.2 % — SIGNIFICANT CHANGE UP (ref 13–44)
MCHC RBC-ENTMCNC: 27.8 PG — SIGNIFICANT CHANGE UP (ref 27–34)
MCHC RBC-ENTMCNC: 31.8 G/DL — LOW (ref 32–36)
MCV RBC AUTO: 87.5 FL — SIGNIFICANT CHANGE UP (ref 80–100)
MONOCYTES NFR BLD AUTO: 7.8 % — SIGNIFICANT CHANGE UP (ref 2–14)
NEUTROPHILS NFR BLD AUTO: 54.6 % — SIGNIFICANT CHANGE UP (ref 43–77)
PLATELET # BLD AUTO: 245 K/UL — SIGNIFICANT CHANGE UP (ref 150–400)
POTASSIUM SERPL-MCNC: 4.5 MMOL/L — SIGNIFICANT CHANGE UP (ref 3.5–5.3)
POTASSIUM SERPL-SCNC: 4.5 MMOL/L — SIGNIFICANT CHANGE UP (ref 3.5–5.3)
PROT SERPL-MCNC: 8.1 G/DL — SIGNIFICANT CHANGE UP (ref 6–8.3)
PROTHROM AB SERPL-ACNC: 11.2 SEC — SIGNIFICANT CHANGE UP (ref 9.8–12.7)
RBC # BLD: 4.31 M/UL — SIGNIFICANT CHANGE UP (ref 3.8–5.2)
RBC # FLD: 12.7 % — SIGNIFICANT CHANGE UP (ref 10.3–16.9)
SODIUM SERPL-SCNC: 140 MMOL/L — SIGNIFICANT CHANGE UP (ref 135–145)
TROPONIN T SERPL-MCNC: <0.01 NG/ML — SIGNIFICANT CHANGE UP (ref 0–0.01)
WBC # BLD: 7 K/UL — SIGNIFICANT CHANGE UP (ref 3.8–10.5)
WBC # FLD AUTO: 7 K/UL — SIGNIFICANT CHANGE UP (ref 3.8–10.5)

## 2018-10-14 PROCEDURE — 84484 ASSAY OF TROPONIN QUANT: CPT

## 2018-10-14 PROCEDURE — 85730 THROMBOPLASTIN TIME PARTIAL: CPT

## 2018-10-14 PROCEDURE — 85025 COMPLETE CBC W/AUTO DIFF WBC: CPT

## 2018-10-14 PROCEDURE — 82553 CREATINE MB FRACTION: CPT

## 2018-10-14 PROCEDURE — 99285 EMERGENCY DEPT VISIT HI MDM: CPT | Mod: 25

## 2018-10-14 PROCEDURE — 85610 PROTHROMBIN TIME: CPT

## 2018-10-14 PROCEDURE — 71046 X-RAY EXAM CHEST 2 VIEWS: CPT

## 2018-10-14 PROCEDURE — 82550 ASSAY OF CK (CPK): CPT

## 2018-10-14 PROCEDURE — 82962 GLUCOSE BLOOD TEST: CPT

## 2018-10-14 PROCEDURE — 99283 EMERGENCY DEPT VISIT LOW MDM: CPT | Mod: 25

## 2018-10-14 PROCEDURE — 83735 ASSAY OF MAGNESIUM: CPT

## 2018-10-14 PROCEDURE — 84443 ASSAY THYROID STIM HORMONE: CPT

## 2018-10-14 PROCEDURE — 80053 COMPREHEN METABOLIC PANEL: CPT

## 2018-10-14 PROCEDURE — 93010 ELECTROCARDIOGRAM REPORT: CPT

## 2018-10-14 PROCEDURE — 93005 ELECTROCARDIOGRAM TRACING: CPT

## 2018-10-14 PROCEDURE — 71046 X-RAY EXAM CHEST 2 VIEWS: CPT | Mod: 26

## 2018-10-14 NOTE — ED PROVIDER NOTE - PSYCHIATRIC, MLM
Alert and oriented to person, place, time/situation. normal mood and affect. no apparent risk to self or others. Alert and oriented to person, place, time/situation. tearful and anxious no apparent risk to self or others.

## 2018-10-14 NOTE — ED ADULT NURSE NOTE - CHPI ED NUR SYMPTOMS NEG
no dizziness/no vomiting/no confusion/no fever/no weakness/no loss of consciousness/no nausea/no numbness/no blurred vision/no change in level of consciousness

## 2018-10-14 NOTE — ED PROVIDER NOTE - MEDICAL DECISION MAKING DETAILS
Pt with symptoms cw likely panic attack / hyperventilation, anxiety, perioral numbness, palpitations, tearful while in ER, now resolved, no evidence for dysrthmia, no metabolic derangement, pt stable for outpt f/u w pcp

## 2018-10-14 NOTE — ED PROVIDER NOTE - OBJECTIVE STATEMENT
42 yo with DM / HTN, presents with anxiety / panic feelings , reports SOB , palpitations and tingling of her hands, reports tremors during event, reports she thinks she was having a panic attack, occurred at 7 pm, improving but still feels a bit shaky, prior ho of panic attack, no CP ,  pt reports she was in her home and symptoms started abruptly . recent went to her GI doctor, was having abd pain and SOB, had d dimer at that time which was normal . SOB on and off for months, non exertional,

## 2018-10-14 NOTE — ED ADULT NURSE NOTE - OBJECTIVE STATEMENT
headache, shakes, tremors --- does not know why these things happen - last episode was a few months ago - after a shower , got "fuzzy " and started shaking ; has seen a neurologist and no DX found

## 2018-10-14 NOTE — ED ADULT TRIAGE NOTE - OTHER COMPLAINTS
Patient reports episode was witnessed by her son, denies any LOC. C/o headache. Ambulating with stead gait.

## 2018-10-18 DIAGNOSIS — Z79.2 LONG TERM (CURRENT) USE OF ANTIBIOTICS: ICD-10-CM

## 2018-10-18 DIAGNOSIS — Z79.899 OTHER LONG TERM (CURRENT) DRUG THERAPY: ICD-10-CM

## 2018-10-18 DIAGNOSIS — F41.9 ANXIETY DISORDER, UNSPECIFIED: ICD-10-CM

## 2018-10-18 DIAGNOSIS — E11.9 TYPE 2 DIABETES MELLITUS WITHOUT COMPLICATIONS: ICD-10-CM

## 2018-10-18 DIAGNOSIS — I10 ESSENTIAL (PRIMARY) HYPERTENSION: ICD-10-CM

## 2018-10-18 DIAGNOSIS — F41.0 PANIC DISORDER [EPISODIC PAROXYSMAL ANXIETY]: ICD-10-CM

## 2018-10-18 DIAGNOSIS — Z88.0 ALLERGY STATUS TO PENICILLIN: ICD-10-CM

## 2018-11-06 ENCOUNTER — EMERGENCY (EMERGENCY)
Facility: HOSPITAL | Age: 43
LOS: 1 days | Discharge: HOME | End: 2018-11-06
Attending: EMERGENCY MEDICINE | Admitting: EMERGENCY MEDICINE
Payer: MEDICAID

## 2018-11-06 VITALS
DIASTOLIC BLOOD PRESSURE: 84 MMHG | HEART RATE: 90 BPM | OXYGEN SATURATION: 99 % | TEMPERATURE: 98 F | RESPIRATION RATE: 18 BRPM | SYSTOLIC BLOOD PRESSURE: 129 MMHG

## 2018-11-06 VITALS
SYSTOLIC BLOOD PRESSURE: 130 MMHG | TEMPERATURE: 98 F | WEIGHT: 244.93 LBS | HEART RATE: 85 BPM | DIASTOLIC BLOOD PRESSURE: 82 MMHG | OXYGEN SATURATION: 97 % | RESPIRATION RATE: 18 BRPM

## 2018-11-06 DIAGNOSIS — Z90.49 ACQUIRED ABSENCE OF OTHER SPECIFIED PARTS OF DIGESTIVE TRACT: Chronic | ICD-10-CM

## 2018-11-06 PROBLEM — E11.9 TYPE 2 DIABETES MELLITUS WITHOUT COMPLICATIONS: Chronic | Status: ACTIVE | Noted: 2018-03-12

## 2018-11-06 LAB
ALBUMIN SERPL ELPH-MCNC: 4 G/DL — SIGNIFICANT CHANGE UP (ref 3.3–5)
ALP SERPL-CCNC: 65 U/L — SIGNIFICANT CHANGE UP (ref 40–120)
ALT FLD-CCNC: 14 U/L — SIGNIFICANT CHANGE UP (ref 10–45)
ANION GAP SERPL CALC-SCNC: 13 MMOL/L — SIGNIFICANT CHANGE UP (ref 5–17)
APPEARANCE UR: CLEAR — SIGNIFICANT CHANGE UP
AST SERPL-CCNC: 12 U/L — SIGNIFICANT CHANGE UP (ref 10–40)
BASOPHILS NFR BLD AUTO: 0.2 % — SIGNIFICANT CHANGE UP (ref 0–2)
BILIRUB SERPL-MCNC: 0.3 MG/DL — SIGNIFICANT CHANGE UP (ref 0.2–1.2)
BILIRUB UR-MCNC: NEGATIVE — SIGNIFICANT CHANGE UP
BUN SERPL-MCNC: 7 MG/DL — SIGNIFICANT CHANGE UP (ref 7–23)
CALCIUM SERPL-MCNC: 9.3 MG/DL — SIGNIFICANT CHANGE UP (ref 8.4–10.5)
CHLORIDE SERPL-SCNC: 98 MMOL/L — SIGNIFICANT CHANGE UP (ref 96–108)
CO2 SERPL-SCNC: 27 MMOL/L — SIGNIFICANT CHANGE UP (ref 22–31)
COLOR SPEC: YELLOW — SIGNIFICANT CHANGE UP
CREAT SERPL-MCNC: 0.59 MG/DL — SIGNIFICANT CHANGE UP (ref 0.5–1.3)
DIFF PNL FLD: NEGATIVE — SIGNIFICANT CHANGE UP
EOSINOPHIL NFR BLD AUTO: 1.1 % — SIGNIFICANT CHANGE UP (ref 0–6)
GLUCOSE SERPL-MCNC: 223 MG/DL — HIGH (ref 70–99)
GLUCOSE UR QL: NEGATIVE — SIGNIFICANT CHANGE UP
HCT VFR BLD CALC: 35.2 % — SIGNIFICANT CHANGE UP (ref 34.5–45)
HGB BLD-MCNC: 11.3 G/DL — LOW (ref 11.5–15.5)
KETONES UR-MCNC: NEGATIVE — SIGNIFICANT CHANGE UP
LEUKOCYTE ESTERASE UR-ACNC: NEGATIVE — SIGNIFICANT CHANGE UP
LIDOCAIN IGE QN: 33 U/L — SIGNIFICANT CHANGE UP (ref 7–60)
LYMPHOCYTES # BLD AUTO: 36.8 % — SIGNIFICANT CHANGE UP (ref 13–44)
MCHC RBC-ENTMCNC: 27.7 PG — SIGNIFICANT CHANGE UP (ref 27–34)
MCHC RBC-ENTMCNC: 32.1 G/DL — SIGNIFICANT CHANGE UP (ref 32–36)
MCV RBC AUTO: 86.3 FL — SIGNIFICANT CHANGE UP (ref 80–100)
MONOCYTES NFR BLD AUTO: 8.1 % — SIGNIFICANT CHANGE UP (ref 2–14)
NEUTROPHILS NFR BLD AUTO: 53.8 % — SIGNIFICANT CHANGE UP (ref 43–77)
NITRITE UR-MCNC: NEGATIVE — SIGNIFICANT CHANGE UP
PH UR: 6.5 — SIGNIFICANT CHANGE UP (ref 5–8)
PLATELET # BLD AUTO: 257 K/UL — SIGNIFICANT CHANGE UP (ref 150–400)
POTASSIUM SERPL-MCNC: 4.1 MMOL/L — SIGNIFICANT CHANGE UP (ref 3.5–5.3)
POTASSIUM SERPL-SCNC: 4.1 MMOL/L — SIGNIFICANT CHANGE UP (ref 3.5–5.3)
PROT SERPL-MCNC: 8.1 G/DL — SIGNIFICANT CHANGE UP (ref 6–8.3)
PROT UR-MCNC: NEGATIVE MG/DL — SIGNIFICANT CHANGE UP
RBC # BLD: 4.08 M/UL — SIGNIFICANT CHANGE UP (ref 3.8–5.2)
RBC # FLD: 12.4 % — SIGNIFICANT CHANGE UP (ref 10.3–16.9)
SODIUM SERPL-SCNC: 138 MMOL/L — SIGNIFICANT CHANGE UP (ref 135–145)
SP GR SPEC: <=1.005 — SIGNIFICANT CHANGE UP (ref 1–1.03)
UROBILINOGEN FLD QL: 0.2 E.U./DL — SIGNIFICANT CHANGE UP
WBC # BLD: 6.3 K/UL — SIGNIFICANT CHANGE UP (ref 3.8–10.5)
WBC # FLD AUTO: 6.3 K/UL — SIGNIFICANT CHANGE UP (ref 3.8–10.5)

## 2018-11-06 PROCEDURE — 83690 ASSAY OF LIPASE: CPT

## 2018-11-06 PROCEDURE — 99284 EMERGENCY DEPT VISIT MOD MDM: CPT | Mod: 25

## 2018-11-06 PROCEDURE — 80053 COMPREHEN METABOLIC PANEL: CPT

## 2018-11-06 PROCEDURE — 93010 ELECTROCARDIOGRAM REPORT: CPT

## 2018-11-06 PROCEDURE — 71046 X-RAY EXAM CHEST 2 VIEWS: CPT | Mod: 26

## 2018-11-06 PROCEDURE — 96374 THER/PROPH/DIAG INJ IV PUSH: CPT

## 2018-11-06 PROCEDURE — 81003 URINALYSIS AUTO W/O SCOPE: CPT

## 2018-11-06 PROCEDURE — 93005 ELECTROCARDIOGRAM TRACING: CPT

## 2018-11-06 PROCEDURE — 36415 COLL VENOUS BLD VENIPUNCTURE: CPT

## 2018-11-06 PROCEDURE — 85025 COMPLETE CBC W/AUTO DIFF WBC: CPT

## 2018-11-06 PROCEDURE — 71046 X-RAY EXAM CHEST 2 VIEWS: CPT

## 2018-11-06 RX ORDER — FAMOTIDINE 10 MG/ML
20 INJECTION INTRAVENOUS ONCE
Qty: 0 | Refills: 0 | Status: COMPLETED | OUTPATIENT
Start: 2018-11-06 | End: 2018-11-06

## 2018-11-06 RX ORDER — LIDOCAINE 4 G/100G
15 CREAM TOPICAL ONCE
Qty: 0 | Refills: 0 | Status: COMPLETED | OUTPATIENT
Start: 2018-11-06 | End: 2018-11-06

## 2018-11-06 RX ORDER — FAMOTIDINE 10 MG/ML
1 INJECTION INTRAVENOUS
Qty: 28 | Refills: 0 | OUTPATIENT
Start: 2018-11-06 | End: 2018-11-19

## 2018-11-06 RX ADMIN — FAMOTIDINE 20 MILLIGRAM(S): 10 INJECTION INTRAVENOUS at 14:17

## 2018-11-06 RX ADMIN — LIDOCAINE 15 MILLILITER(S): 4 CREAM TOPICAL at 14:18

## 2018-11-06 RX ADMIN — Medication 30 MILLILITER(S): at 14:17

## 2018-11-06 NOTE — ED ADULT NURSE NOTE - NSIMPLEMENTINTERV_GEN_ALL_ED
Implemented All Universal Safety Interventions:  [place] to call system. Call bell, personal items and telephone within reach. Instruct patient to call for assistance. Room bathroom lighting operational. Non-slip footwear when patient is off stretcher. Physically safe environment: no spills, clutter or unnecessary equipment. Stretcher in lowest position, wheels locked, appropriate side rails in place.

## 2018-11-06 NOTE — ED PROVIDER NOTE - PHYSICAL EXAMINATION
VITAL SIGNS: I have reviewed nursing notes and confirm.  CONSTITUTIONAL: Well-developed; well-nourished; in no acute distress.   SKIN:  warm and dry, no acute rash.   HEAD:  normocephalic, atraumatic.  EYES: PERRL, EOM intact; conjunctiva and sclera clear.  ENT: No nasal discharge; airway clear.   NECK: Supple; non tender.  CARD: S1, S2 normal; no murmurs, gallops, or rubs. Regular rate and rhythm.   RESP:  Clear to auscultation b/l, no wheezes, rales or rhonchi.  ABD: Moderate ttp in epigastric region, L>R, without peritoneal signs. Normal bowel sounds; soft; non-distended; no guarding/ rebound.  EXT: Normal ROM. No clubbing, cyanosis or edema. 2+ pulses to b/l ue/le.  NEURO: Alert, oriented, grossly unremarkable  PSYCH: Cooperative, mood and affect appropriate.

## 2018-11-06 NOTE — ED PROVIDER NOTE - NS ED ROS FT
Constitutional: No fever. No chills.  Eyes: No redness. No discharge. No vision change.   ENT: No sore throat. No ear pain.  Cardiovascular: No chest pain. No leg swelling.  Respiratory: No cough. No shortness of breath.  GI: +abdominal pain. No vomiting. No diarrhea.   : +urinary hesitancy. No dysuria.   MSK: No joint pain. No back pain.   Skin: No rash. No abrasions.   Neuro: No numbness. No weakness.   Psych: No known mental health issues.

## 2018-11-06 NOTE — ED PROVIDER NOTE - OBJECTIVE STATEMENT
43yoF with pmhx of DM and H.pylori presents with 1mo of LUQ, worse today. Pt reports endoscopy ~1month ago with H.pylori diagnosis at that time. States she only started amoxicillin/clarithromycin and pantoprazole 2wk ago. She states that she has had intermittent, burning LUQ pain despite medications. Reports that when she has pain she will feel a "bubble" in her chest that resolves after she hits her chest or her children rub her back. Also endorsing urinary hesitancy. She denies fever, chills, N/V, hematemesis, blood in stool, diarrhea, constipation, dysuria, hematuria. Has follow up with PMD and her GI physician.

## 2018-11-06 NOTE — ED ADULT NURSE NOTE - OBJECTIVE STATEMENT
Pt w/ PMH of chronic epigastric pain presents to ED today BIBA c/o 8/10 LUQ pain.  Pt elicits pain is not worsening or changing in quality.  Pt denies N/V or changes to bowel habits.  Pt ECG via EMS shows NSR.  Pt is pending labs and XR.

## 2018-11-06 NOTE — ED PROVIDER NOTE - ATTENDING CONTRIBUTION TO CARE
43 yoF with PMH of DM and H.pylori presents with 1 month of intermittent, burning LUQ abd pain, worse today. Pt reports endoscopy ~1month ago with H.pylori diagnosis at that time and is currently taking antibiotics for it that she started 2 weeks ago. Denies fever, chills, N/V, hematemesis, blood in stool, diarrhea, constipation, dysuria, hematuria, CP, SOB. Has follow up with PMD and her GI physician. Pt AAO, NAD, mild epigastric and LUQ abd TTP, no rebound, no guarding. Labs/ studies noted. Pt given GI cocktail with improvement in sxs. To f/up outpt with GI.

## 2018-11-06 NOTE — ED ADULT TRIAGE NOTE - CHIEF COMPLAINT QUOTE
pt BIBA c/o LUQ pain x months.  reports PMH of DAMASO Agarwal, took Prilosec today and pain increased after.

## 2018-11-06 NOTE — ED ADULT NURSE NOTE - PMH
No pertinent past medical history    Type 2 diabetes mellitus without complication, without long-term current use of insulin

## 2018-11-06 NOTE — ED PROVIDER NOTE - MEDICAL DECISION MAKING DETAILS
ED course unremarkable - afebrile and hemodynamically stable. ED course unremarkable - afebrile and hemodynamically stable. She has moderate epigastric ttp, primarily in LUQ without peritoneal signs. She is tolerating PO in ED. she reports improvement after pepcid 20mg IV and maalox/viscous lidocaine. EKG without evidence of acute ischemia. CXR performed to r/o free air in the setting of acute worsening of pain and negative. CBC, lipase, UA unremarkable. UPT negative. CMP unremarkable aside from hyperglycemia with . Discussed all results with patient and provided with copies of results. Will add pepcid to H.pylori regimen, otherwise continue current medications. Pt has GI follow up. Return precautions given.

## 2018-11-06 NOTE — ED ADULT NURSE NOTE - CHPI ED NUR SYMPTOMS NEG
no chills/no fever/no abdominal distension/no hematuria/no nausea/no blood in stool/no dysuria/no burning urination/no diarrhea/no vomiting

## 2018-11-10 DIAGNOSIS — R10.13 EPIGASTRIC PAIN: ICD-10-CM

## 2018-11-10 DIAGNOSIS — Z88.0 ALLERGY STATUS TO PENICILLIN: ICD-10-CM

## 2018-11-10 DIAGNOSIS — Z79.2 LONG TERM (CURRENT) USE OF ANTIBIOTICS: ICD-10-CM

## 2018-11-10 DIAGNOSIS — E11.9 TYPE 2 DIABETES MELLITUS WITHOUT COMPLICATIONS: ICD-10-CM

## 2018-11-10 DIAGNOSIS — R10.12 LEFT UPPER QUADRANT PAIN: ICD-10-CM

## 2018-11-10 DIAGNOSIS — Z79.899 OTHER LONG TERM (CURRENT) DRUG THERAPY: ICD-10-CM

## 2018-11-10 DIAGNOSIS — R39.11 HESITANCY OF MICTURITION: ICD-10-CM

## 2018-11-28 ENCOUNTER — EMERGENCY (EMERGENCY)
Facility: HOSPITAL | Age: 43
LOS: 1 days | Discharge: HOME | End: 2018-11-28
Attending: EMERGENCY MEDICINE | Admitting: EMERGENCY MEDICINE
Payer: MEDICAID

## 2018-11-28 VITALS
SYSTOLIC BLOOD PRESSURE: 142 MMHG | RESPIRATION RATE: 16 BRPM | TEMPERATURE: 98 F | HEART RATE: 78 BPM | OXYGEN SATURATION: 100 % | DIASTOLIC BLOOD PRESSURE: 95 MMHG

## 2018-11-28 VITALS
TEMPERATURE: 98 F | WEIGHT: 248.02 LBS | DIASTOLIC BLOOD PRESSURE: 89 MMHG | HEART RATE: 81 BPM | OXYGEN SATURATION: 98 % | SYSTOLIC BLOOD PRESSURE: 132 MMHG | RESPIRATION RATE: 18 BRPM

## 2018-11-28 DIAGNOSIS — Z87.891 PERSONAL HISTORY OF NICOTINE DEPENDENCE: ICD-10-CM

## 2018-11-28 DIAGNOSIS — Z88.0 ALLERGY STATUS TO PENICILLIN: ICD-10-CM

## 2018-11-28 DIAGNOSIS — R42 DIZZINESS AND GIDDINESS: ICD-10-CM

## 2018-11-28 DIAGNOSIS — Z79.899 OTHER LONG TERM (CURRENT) DRUG THERAPY: ICD-10-CM

## 2018-11-28 DIAGNOSIS — R51 HEADACHE: ICD-10-CM

## 2018-11-28 DIAGNOSIS — Z79.2 LONG TERM (CURRENT) USE OF ANTIBIOTICS: ICD-10-CM

## 2018-11-28 DIAGNOSIS — Z90.49 ACQUIRED ABSENCE OF OTHER SPECIFIED PARTS OF DIGESTIVE TRACT: Chronic | ICD-10-CM

## 2018-11-28 DIAGNOSIS — E11.9 TYPE 2 DIABETES MELLITUS WITHOUT COMPLICATIONS: ICD-10-CM

## 2018-11-28 LAB
ALBUMIN SERPL ELPH-MCNC: 3.9 G/DL — SIGNIFICANT CHANGE UP (ref 3.3–5)
ALP SERPL-CCNC: 64 U/L — SIGNIFICANT CHANGE UP (ref 40–120)
ALT FLD-CCNC: 16 U/L — SIGNIFICANT CHANGE UP (ref 10–45)
ANION GAP SERPL CALC-SCNC: 9 MMOL/L — SIGNIFICANT CHANGE UP (ref 5–17)
AST SERPL-CCNC: 15 U/L — SIGNIFICANT CHANGE UP (ref 10–40)
BILIRUB SERPL-MCNC: 0.2 MG/DL — SIGNIFICANT CHANGE UP (ref 0.2–1.2)
BUN SERPL-MCNC: 8 MG/DL — SIGNIFICANT CHANGE UP (ref 7–23)
CALCIUM SERPL-MCNC: 9.1 MG/DL — SIGNIFICANT CHANGE UP (ref 8.4–10.5)
CHLORIDE SERPL-SCNC: 99 MMOL/L — SIGNIFICANT CHANGE UP (ref 96–108)
CO2 SERPL-SCNC: 28 MMOL/L — SIGNIFICANT CHANGE UP (ref 22–31)
CREAT SERPL-MCNC: 0.6 MG/DL — SIGNIFICANT CHANGE UP (ref 0.5–1.3)
GLUCOSE SERPL-MCNC: 193 MG/DL — HIGH (ref 70–99)
HCT VFR BLD CALC: 35.8 % — SIGNIFICANT CHANGE UP (ref 34.5–45)
HGB BLD-MCNC: 11.5 G/DL — SIGNIFICANT CHANGE UP (ref 11.5–15.5)
MCHC RBC-ENTMCNC: 27.6 PG — SIGNIFICANT CHANGE UP (ref 27–34)
MCHC RBC-ENTMCNC: 32.1 G/DL — SIGNIFICANT CHANGE UP (ref 32–36)
MCV RBC AUTO: 85.9 FL — SIGNIFICANT CHANGE UP (ref 80–100)
PLATELET # BLD AUTO: 273 K/UL — SIGNIFICANT CHANGE UP (ref 150–400)
POTASSIUM SERPL-MCNC: 4 MMOL/L — SIGNIFICANT CHANGE UP (ref 3.5–5.3)
POTASSIUM SERPL-SCNC: 4 MMOL/L — SIGNIFICANT CHANGE UP (ref 3.5–5.3)
PROT SERPL-MCNC: 8.1 G/DL — SIGNIFICANT CHANGE UP (ref 6–8.3)
RBC # BLD: 4.17 M/UL — SIGNIFICANT CHANGE UP (ref 3.8–5.2)
RBC # FLD: 13.2 % — SIGNIFICANT CHANGE UP (ref 10.3–16.9)
SODIUM SERPL-SCNC: 136 MMOL/L — SIGNIFICANT CHANGE UP (ref 135–145)
WBC # BLD: 6.6 K/UL — SIGNIFICANT CHANGE UP (ref 3.8–10.5)
WBC # FLD AUTO: 6.6 K/UL — SIGNIFICANT CHANGE UP (ref 3.8–10.5)

## 2018-11-28 PROCEDURE — 70450 CT HEAD/BRAIN W/O DYE: CPT

## 2018-11-28 PROCEDURE — 70450 CT HEAD/BRAIN W/O DYE: CPT | Mod: 26

## 2018-11-28 PROCEDURE — 80053 COMPREHEN METABOLIC PANEL: CPT

## 2018-11-28 PROCEDURE — 85027 COMPLETE CBC AUTOMATED: CPT

## 2018-11-28 PROCEDURE — 99284 EMERGENCY DEPT VISIT MOD MDM: CPT | Mod: 25

## 2018-11-28 PROCEDURE — 93010 ELECTROCARDIOGRAM REPORT: CPT

## 2018-11-28 PROCEDURE — 93005 ELECTROCARDIOGRAM TRACING: CPT

## 2018-11-28 PROCEDURE — 82962 GLUCOSE BLOOD TEST: CPT

## 2018-11-28 PROCEDURE — 36415 COLL VENOUS BLD VENIPUNCTURE: CPT

## 2018-11-28 RX ORDER — MECLIZINE HCL 12.5 MG
25 TABLET ORAL ONCE
Qty: 0 | Refills: 0 | Status: COMPLETED | OUTPATIENT
Start: 2018-11-28 | End: 2018-11-28

## 2018-11-28 RX ORDER — SODIUM CHLORIDE 9 MG/ML
1000 INJECTION INTRAMUSCULAR; INTRAVENOUS; SUBCUTANEOUS
Qty: 0 | Refills: 0 | Status: COMPLETED | OUTPATIENT
Start: 2018-11-28 | End: 2018-11-28

## 2018-11-28 RX ORDER — MECLIZINE HCL 12.5 MG
1 TABLET ORAL
Qty: 15 | Refills: 0 | OUTPATIENT
Start: 2018-11-28 | End: 2018-12-02

## 2018-11-28 RX ADMIN — Medication 25 MILLIGRAM(S): at 16:35

## 2018-11-28 RX ADMIN — SODIUM CHLORIDE 100 MILLILITER(S): 9 INJECTION INTRAMUSCULAR; INTRAVENOUS; SUBCUTANEOUS at 16:35

## 2018-11-28 NOTE — ED ADULT NURSE NOTE - PMH
H. pylori infection    Type 2 diabetes mellitus without complication, without long-term current use of insulin

## 2018-11-28 NOTE — ED ADULT NURSE NOTE - OBJECTIVE STATEMENT
pt c/o headache and dizziness "for more than 3 days." Pt states around 1pm today started to feel twitching in her lips and face. She states this has happened to her previously and f/u with a neurologist who suggested it was from a migraine. Pt also states has a sudden sharp pain in her right ear that has since lessened. Currently states the pain is 3/10. Pt states she has a "crushing" right sided headache also.

## 2018-11-28 NOTE — ED PROVIDER NOTE - ATTENDING CONTRIBUTION TO CARE
pt seen and examined with resident - 43 F with DM2, H pylori with vague dizziness  x 3 days-  positional with assoc nystagmus right lateral- NIH SS=0  CT head neg  normal gait- no focal deficits improved may dc on meclizine with recheck with pmd in 1 day or ED if increased sx dizziness  headache N/V

## 2018-11-28 NOTE — ED PROVIDER NOTE - CRANIAL NERVE AND PUPILLARY EXAM
cranial nerves 2-12 intact central and peripheral vision intact/gag reflex intact/tongue is midline/peripheral vision intact/cranial nerves 2-12 intact/corneal reflex intact/central vision intact/extra-ocular movements intact

## 2018-11-28 NOTE — ED PROVIDER NOTE - MEDICAL DECISION MAKING DETAILS
CT head, labs, ekg, r/o stroke CT head, labs, ekg, ? vertigo?-- normal exam -- improved with mecilizine 52 yo F with  positional dizziness  x 3 days-- NIH SS=0 CT head, labs, EKG- possible vertigo- nl gait no focal deficits-- mild right lateral nystagmus--- normal exam -- pt much improved with meclazine-

## 2018-11-28 NOTE — ED ADULT TRIAGE NOTE - CHIEF COMPLAINT QUOTE
patient BIBA complains of headache x 3 days. she states that she has a history of headaches and has an appointment with neurologist next week. did not take any medications for it

## 2018-11-28 NOTE — ED ADULT NURSE NOTE - NSIMPLEMENTINTERV_GEN_ALL_ED
Implemented All Universal Safety Interventions:  [place] to call system. Call bell, personal items and telephone within reach. Instruct patient to call for assistance. Room bathroom lighting operational. Non-slip footwear when patient is off stretcher. Physically safe environment: no spills, clutter or unnecessary equipment. Stretcher in lowest position, wheels locked, appropriate side rails in place.

## 2018-11-28 NOTE — ED PROVIDER NOTE - CONSTITUTIONAL NEGATIVE STATEMENT, MLM
Pt verbalized understanding of recommendations for lung testing and that she will receive call from them to jean carlos the appt.  Informed her per Dr Butler, lung testing first, then she will put order in later for colonoscopy.     no fever and no chills.

## 2018-11-28 NOTE — ED PROVIDER NOTE - OBJECTIVE STATEMENT
42 yo F with pmh of DMII, recent H. pylori, and headache and dizziness presents to ED with new onset headache and dizziness. Patient reports symptoms fro the last 3 day. Today she went to  her son and was too dizzy to walk, prompting her to call 911. In the ED she complains of a constellation of symptoms involving her head and right ear and eye. 42 yo F with pmh of DMII, recent H. pylori, and headache and dizziness presents to ED with new onset headache and dizziness. Patient reports symptoms fro the last 3 day. Today she went to  her son and was too dizzy to walk, prompting her to call 911. In the ED she complains of a constellation of symptoms involving her head and right ear and eye. She has pressure and 8/10 pain in her head, mainly on the right side as well as pain in her ear. She has intermittent dizziness with no clear inciting cause. She also has right eye blurriness. She denies fever, chills, nausea, vomiting, sob, cp.

## 2019-01-16 ENCOUNTER — EMERGENCY (EMERGENCY)
Facility: HOSPITAL | Age: 44
LOS: 1 days | Discharge: HOME | End: 2019-01-16
Attending: EMERGENCY MEDICINE | Admitting: EMERGENCY MEDICINE
Payer: MEDICAID

## 2019-01-16 VITALS
DIASTOLIC BLOOD PRESSURE: 90 MMHG | OXYGEN SATURATION: 98 % | SYSTOLIC BLOOD PRESSURE: 150 MMHG | HEART RATE: 98 BPM | TEMPERATURE: 99 F | RESPIRATION RATE: 16 BRPM

## 2019-01-16 DIAGNOSIS — Z90.49 ACQUIRED ABSENCE OF OTHER SPECIFIED PARTS OF DIGESTIVE TRACT: Chronic | ICD-10-CM

## 2019-01-16 LAB
ALBUMIN SERPL ELPH-MCNC: 4.1 G/DL — SIGNIFICANT CHANGE UP (ref 3.3–5)
ALP SERPL-CCNC: 71 U/L — SIGNIFICANT CHANGE UP (ref 40–120)
ALT FLD-CCNC: 15 U/L — SIGNIFICANT CHANGE UP (ref 10–45)
ANION GAP SERPL CALC-SCNC: 13 MMOL/L — SIGNIFICANT CHANGE UP (ref 5–17)
APPEARANCE UR: CLEAR — SIGNIFICANT CHANGE UP
AST SERPL-CCNC: 14 U/L — SIGNIFICANT CHANGE UP (ref 10–40)
B-OH-BUTYR SERPL-SCNC: 0.3 MMOL/L — SIGNIFICANT CHANGE UP
BILIRUB SERPL-MCNC: 0.4 MG/DL — SIGNIFICANT CHANGE UP (ref 0.2–1.2)
BILIRUB UR-MCNC: NEGATIVE — SIGNIFICANT CHANGE UP
BUN SERPL-MCNC: 6 MG/DL — LOW (ref 7–23)
CALCIUM SERPL-MCNC: 9.1 MG/DL — SIGNIFICANT CHANGE UP (ref 8.4–10.5)
CHLORIDE SERPL-SCNC: 99 MMOL/L — SIGNIFICANT CHANGE UP (ref 96–108)
CO2 SERPL-SCNC: 28 MMOL/L — SIGNIFICANT CHANGE UP (ref 22–31)
COLOR SPEC: YELLOW — SIGNIFICANT CHANGE UP
CREAT SERPL-MCNC: 0.7 MG/DL — SIGNIFICANT CHANGE UP (ref 0.5–1.3)
DIFF PNL FLD: NEGATIVE — SIGNIFICANT CHANGE UP
EOSINOPHIL NFR BLD AUTO: 0.6 % — SIGNIFICANT CHANGE UP (ref 0–6)
GLUCOSE SERPL-MCNC: 153 MG/DL — HIGH (ref 70–99)
GLUCOSE UR QL: NEGATIVE — SIGNIFICANT CHANGE UP
HCT VFR BLD CALC: 38.3 % — SIGNIFICANT CHANGE UP (ref 34.5–45)
HGB BLD-MCNC: 12.2 G/DL — SIGNIFICANT CHANGE UP (ref 11.5–15.5)
KETONES UR-MCNC: NEGATIVE — SIGNIFICANT CHANGE UP
LACTATE SERPL-SCNC: 1.1 MMOL/L — SIGNIFICANT CHANGE UP (ref 0.5–2)
LEUKOCYTE ESTERASE UR-ACNC: NEGATIVE — SIGNIFICANT CHANGE UP
LIDOCAIN IGE QN: 23 U/L — SIGNIFICANT CHANGE UP (ref 7–60)
LYMPHOCYTES # BLD AUTO: 17.9 % — SIGNIFICANT CHANGE UP (ref 13–44)
MCHC RBC-ENTMCNC: 27.2 PG — SIGNIFICANT CHANGE UP (ref 27–34)
MCHC RBC-ENTMCNC: 31.9 G/DL — LOW (ref 32–36)
MCV RBC AUTO: 85.5 FL — SIGNIFICANT CHANGE UP (ref 80–100)
MONOCYTES NFR BLD AUTO: 6.3 % — SIGNIFICANT CHANGE UP (ref 2–14)
NEUTROPHILS NFR BLD AUTO: 75.2 % — SIGNIFICANT CHANGE UP (ref 43–77)
NITRITE UR-MCNC: NEGATIVE — SIGNIFICANT CHANGE UP
PH UR: 6.5 — SIGNIFICANT CHANGE UP (ref 5–8)
PLATELET # BLD AUTO: 246 K/UL — SIGNIFICANT CHANGE UP (ref 150–400)
POTASSIUM SERPL-MCNC: 4.5 MMOL/L — SIGNIFICANT CHANGE UP (ref 3.5–5.3)
POTASSIUM SERPL-SCNC: 4.5 MMOL/L — SIGNIFICANT CHANGE UP (ref 3.5–5.3)
PROT SERPL-MCNC: 8.6 G/DL — HIGH (ref 6–8.3)
PROT UR-MCNC: NEGATIVE MG/DL — SIGNIFICANT CHANGE UP
RBC # BLD: 4.48 M/UL — SIGNIFICANT CHANGE UP (ref 3.8–5.2)
RBC # FLD: 13.5 % — SIGNIFICANT CHANGE UP (ref 10.3–16.9)
SODIUM SERPL-SCNC: 140 MMOL/L — SIGNIFICANT CHANGE UP (ref 135–145)
SP GR SPEC: 1.02 — SIGNIFICANT CHANGE UP (ref 1–1.03)
UROBILINOGEN FLD QL: 0.2 E.U./DL — SIGNIFICANT CHANGE UP
WBC # BLD: 6.4 K/UL — SIGNIFICANT CHANGE UP (ref 3.8–10.5)
WBC # FLD AUTO: 6.4 K/UL — SIGNIFICANT CHANGE UP (ref 3.8–10.5)

## 2019-01-16 PROCEDURE — 74177 CT ABD & PELVIS W/CONTRAST: CPT | Mod: 26

## 2019-01-16 PROCEDURE — 93010 ELECTROCARDIOGRAM REPORT: CPT

## 2019-01-16 PROCEDURE — 99285 EMERGENCY DEPT VISIT HI MDM: CPT | Mod: 25

## 2019-01-16 RX ORDER — IOHEXOL 300 MG/ML
30 INJECTION, SOLUTION INTRAVENOUS ONCE
Qty: 0 | Refills: 0 | Status: COMPLETED | OUTPATIENT
Start: 2019-01-16 | End: 2019-01-16

## 2019-01-16 RX ORDER — SUCRALFATE 1 G
1 TABLET ORAL ONCE
Qty: 0 | Refills: 0 | Status: COMPLETED | OUTPATIENT
Start: 2019-01-16 | End: 2019-01-16

## 2019-01-16 RX ORDER — FAMOTIDINE 10 MG/ML
20 INJECTION INTRAVENOUS ONCE
Qty: 0 | Refills: 0 | Status: COMPLETED | OUTPATIENT
Start: 2019-01-16 | End: 2019-01-16

## 2019-01-16 RX ORDER — ONDANSETRON 8 MG/1
4 TABLET, FILM COATED ORAL ONCE
Qty: 0 | Refills: 0 | Status: COMPLETED | OUTPATIENT
Start: 2019-01-16 | End: 2019-01-16

## 2019-01-16 RX ORDER — PANTOPRAZOLE SODIUM 20 MG/1
40 TABLET, DELAYED RELEASE ORAL ONCE
Qty: 0 | Refills: 0 | Status: COMPLETED | OUTPATIENT
Start: 2019-01-16 | End: 2019-01-16

## 2019-01-16 RX ORDER — SODIUM CHLORIDE 9 MG/ML
1000 INJECTION INTRAMUSCULAR; INTRAVENOUS; SUBCUTANEOUS ONCE
Qty: 0 | Refills: 0 | Status: COMPLETED | OUTPATIENT
Start: 2019-01-16 | End: 2019-01-16

## 2019-01-16 RX ORDER — LIDOCAINE 4 G/100G
5 CREAM TOPICAL ONCE
Qty: 0 | Refills: 0 | Status: COMPLETED | OUTPATIENT
Start: 2019-01-16 | End: 2019-01-16

## 2019-01-16 RX ADMIN — Medication 1 GRAM(S): at 20:30

## 2019-01-16 RX ADMIN — IOHEXOL 30 MILLILITER(S): 300 INJECTION, SOLUTION INTRAVENOUS at 22:09

## 2019-01-16 RX ADMIN — FAMOTIDINE 20 MILLIGRAM(S): 10 INJECTION INTRAVENOUS at 20:01

## 2019-01-16 RX ADMIN — SODIUM CHLORIDE 1500 MILLILITER(S): 9 INJECTION INTRAMUSCULAR; INTRAVENOUS; SUBCUTANEOUS at 19:31

## 2019-01-16 RX ADMIN — Medication 15 MILLILITER(S): at 19:59

## 2019-01-16 RX ADMIN — ONDANSETRON 4 MILLIGRAM(S): 8 TABLET, FILM COATED ORAL at 20:01

## 2019-01-16 RX ADMIN — PANTOPRAZOLE SODIUM 40 MILLIGRAM(S): 20 TABLET, DELAYED RELEASE ORAL at 21:15

## 2019-01-16 NOTE — ED PROVIDER NOTE - MEDICAL DECISION MAKING DETAILS
Patient with epigastric pain with no rebound or guarding  and normal labs. Ekg no acute changes. Multiple round of GI meds given with no break through for pain. Pt with persistent pending ct scan r/o obs vs appendicitis. Pt decline morphine for pain.

## 2019-01-16 NOTE — ED ADULT NURSE NOTE - NSIMPLEMENTINTERV_GEN_ALL_ED
Implemented All Universal Safety Interventions:  [place] to call system. Call bell, personal items and telephone within reach. Instruct patient to call for assistance. Room bathroom lighting operational. Non-slip footwear when patient is off stretcher. Physically safe environment: no spills, clutter or unnecessary equipment. Stretcher in lowest position, wheels locked, appropriate side rails in place.

## 2019-01-16 NOTE — ED ADULT TRIAGE NOTE - OTHER COMPLAINTS
pt c.o epigastric pain x2 days with nausea. lmp 11/18. hx dm, fs 170. denies taking medications x 2 weeks.

## 2019-01-16 NOTE — ED ADULT NURSE NOTE - OBJECTIVE STATEMENT
Pt presents to ED with c/o diffuse abdominal pain described as "sharp" and "burning" at 8/10 x2 days, endorses difficulty tolerating PO and nausea, with one episode of loose stool yesterday, +chills, unknown fevers. Denies vomiting or urinary sx. Hx DM2, reports being in between medications at this time. LMP 11/24, per pt it is "irregular," denies abnormal vaginal discharge.

## 2019-01-16 NOTE — ED PROVIDER NOTE - OBJECTIVE STATEMENT
44 y/o f with h/o type II DM , gastritis, GERD presents to ED c/o epigastric pain since last night worsening this through out the day. Admit of nausea , sharp burning sensation. Report of not taking metformin x 3 weeks  due to hypoglycemia a few times while on the med. She was switch from januvia to metformin due lack of diabetes control. She was treated a few weeks ago for H. Pylori and finished the treatement. Denies fever, v, d, sob, chest  pain, dysuria, hematuria. Last BM 2 days ago. Passing some flatulence

## 2019-01-16 NOTE — ED PROVIDER NOTE - ATTENDING CONTRIBUTION TO CARE
Pt w/ PMHx Pt w/ PMHx DM (off Metformin x 3 weeks - states the BS is going to low, pending endocrinologist visit) p/w epigastric pain, onset yesterday, worsened today. + nausea. No vomiting. Last BM 2 days ago. No obstipation. No urinary sx. No f/c. No CP, SOB. Pt sees a GI doctor, + gastritis w/ H Pylori, recently completed treatment. Pt w/ PMHx DM (off Metformin x 3 weeks - states the BS is going to low, pending endocrinologist visit) p/w epigastric pain, onset yesterday, worsened today. + nausea. No vomiting. Last BM 2 days ago. No obstipation. No urinary sx. No f/c. No CP, SOB. Pt sees a GI doctor, + gastritis w/ H Pylori, recently completed treatment.  Constitutional: Well appearing, obese awake, alert, oriented to person, place, time/situation and in no apparent distress.  ENMT: Airway patent. Normal MM  Eyes: Clear bilaterally  Cardiac: Normal rate, regular rhythm.  Heart sounds S1, S2.  No murmurs, rubs or gallops.  Respiratory: Breaths sounds equal and clear b/l. No increased WOB, tachypnea, hypoxia, or accessory mm use. Pt speaks in full sentences.   Gastrointestinal: Abd soft, ND, NABS. + ttp in the epigastric, L mid and LLQ. No guarding, rebound, or rigidity. No pulsatile abdominal masses. No organomegaly appreciated. No CVAT   Musculoskeletal: Range of motion is not limited  Neuro: Alert and oriented x 3, face symmetric and speech fluent. Strength 5/5 x 4 ext and symmetric, nml gross motor movement, nml gait. No focal deficits noted.  Skin: Skin normal color for race, warm, dry and intact. No evidence of rash.  Psych: Alert and oriented to person, place, time/situation. normal mood and affect. no apparent risk to self or others.   Abd pain. Initially impressed Gastritis / GERD, however no relief w/ GI cocktail. Labs neg. pt then w/ L sided ttp. Will get CT a/p. Pt declining further analgesia at this time. Dispo pending w/u and clinical status

## 2019-01-16 NOTE — ED PROVIDER NOTE - PROGRESS NOTE DETAILS
Pt still c/o pain. Pt refuses additional pain medications multiple time. Pt finally in agreement to "baby dose" of Morphine. Pt only received approx 1 mg and declined further. Pending CT results.

## 2019-01-17 VITALS
TEMPERATURE: 99 F | DIASTOLIC BLOOD PRESSURE: 81 MMHG | OXYGEN SATURATION: 96 % | HEART RATE: 87 BPM | SYSTOLIC BLOOD PRESSURE: 133 MMHG | RESPIRATION RATE: 16 BRPM

## 2019-01-17 PROBLEM — A04.8 OTHER SPECIFIED BACTERIAL INTESTINAL INFECTIONS: Chronic | Status: ACTIVE | Noted: 2018-11-28

## 2019-01-17 PROCEDURE — 82010 KETONE BODYS QUAN: CPT

## 2019-01-17 PROCEDURE — 93005 ELECTROCARDIOGRAM TRACING: CPT

## 2019-01-17 PROCEDURE — 81003 URINALYSIS AUTO W/O SCOPE: CPT

## 2019-01-17 PROCEDURE — 83690 ASSAY OF LIPASE: CPT

## 2019-01-17 PROCEDURE — 96375 TX/PRO/DX INJ NEW DRUG ADDON: CPT

## 2019-01-17 PROCEDURE — 74177 CT ABD & PELVIS W/CONTRAST: CPT

## 2019-01-17 PROCEDURE — 80053 COMPREHEN METABOLIC PANEL: CPT

## 2019-01-17 PROCEDURE — 99284 EMERGENCY DEPT VISIT MOD MDM: CPT | Mod: 25

## 2019-01-17 PROCEDURE — 96374 THER/PROPH/DIAG INJ IV PUSH: CPT | Mod: XU

## 2019-01-17 PROCEDURE — 87086 URINE CULTURE/COLONY COUNT: CPT

## 2019-01-17 PROCEDURE — 83605 ASSAY OF LACTIC ACID: CPT

## 2019-01-17 PROCEDURE — 85025 COMPLETE CBC W/AUTO DIFF WBC: CPT

## 2019-01-17 PROCEDURE — 36415 COLL VENOUS BLD VENIPUNCTURE: CPT

## 2019-01-17 RX ORDER — MORPHINE SULFATE 50 MG/1
2 CAPSULE, EXTENDED RELEASE ORAL ONCE
Qty: 0 | Refills: 0 | Status: DISCONTINUED | OUTPATIENT
Start: 2019-01-17 | End: 2019-01-17

## 2019-01-17 RX ORDER — ACETAMINOPHEN 500 MG
1000 TABLET ORAL ONCE
Qty: 0 | Refills: 0 | Status: COMPLETED | OUTPATIENT
Start: 2019-01-17 | End: 2019-01-17

## 2019-01-17 RX ADMIN — MORPHINE SULFATE 2 MILLIGRAM(S): 50 CAPSULE, EXTENDED RELEASE ORAL at 00:12

## 2019-01-17 RX ADMIN — Medication 400 MILLIGRAM(S): at 00:29

## 2019-01-17 RX ADMIN — Medication 1000 MILLIGRAM(S): at 01:24

## 2019-01-17 NOTE — ED ADULT NURSE REASSESSMENT NOTE - NS ED NURSE REASSESS COMMENT FT1
pt received from [facility] Rn with care in progress pt resting in chair comfortable but complaining of abd pain. had previsously refused narcotics, now states she will try, upon administering 2mg morphine pt refused any further after ~1mg IVP given/ Md Parks aware, pt also noted to have fever 100.3. IV Tylenol ordered will administer monitor and reassess

## 2019-01-18 LAB
CULTURE RESULTS: SIGNIFICANT CHANGE UP
SPECIMEN SOURCE: SIGNIFICANT CHANGE UP

## 2019-01-20 DIAGNOSIS — E11.9 TYPE 2 DIABETES MELLITUS WITHOUT COMPLICATIONS: ICD-10-CM

## 2019-01-20 DIAGNOSIS — Z79.899 OTHER LONG TERM (CURRENT) DRUG THERAPY: ICD-10-CM

## 2019-01-20 DIAGNOSIS — R10.13 EPIGASTRIC PAIN: ICD-10-CM

## 2019-01-20 DIAGNOSIS — R11.0 NAUSEA: ICD-10-CM

## 2019-01-20 DIAGNOSIS — Z79.2 LONG TERM (CURRENT) USE OF ANTIBIOTICS: ICD-10-CM

## 2019-01-20 DIAGNOSIS — Z88.0 ALLERGY STATUS TO PENICILLIN: ICD-10-CM

## 2019-04-30 ENCOUNTER — EMERGENCY (EMERGENCY)
Facility: HOSPITAL | Age: 44
LOS: 1 days | Discharge: HOME | End: 2019-04-30
Attending: EMERGENCY MEDICINE | Admitting: EMERGENCY MEDICINE
Payer: MEDICAID

## 2019-04-30 VITALS
SYSTOLIC BLOOD PRESSURE: 133 MMHG | RESPIRATION RATE: 17 BRPM | DIASTOLIC BLOOD PRESSURE: 79 MMHG | TEMPERATURE: 98 F | OXYGEN SATURATION: 97 % | HEART RATE: 88 BPM

## 2019-04-30 VITALS
TEMPERATURE: 98 F | HEART RATE: 85 BPM | RESPIRATION RATE: 18 BRPM | SYSTOLIC BLOOD PRESSURE: 139 MMHG | OXYGEN SATURATION: 96 % | DIASTOLIC BLOOD PRESSURE: 89 MMHG

## 2019-04-30 DIAGNOSIS — Z90.49 ACQUIRED ABSENCE OF OTHER SPECIFIED PARTS OF DIGESTIVE TRACT: Chronic | ICD-10-CM

## 2019-04-30 DIAGNOSIS — R20.2 PARESTHESIA OF SKIN: ICD-10-CM

## 2019-04-30 DIAGNOSIS — Z79.84 LONG TERM (CURRENT) USE OF ORAL HYPOGLYCEMIC DRUGS: ICD-10-CM

## 2019-04-30 DIAGNOSIS — R42 DIZZINESS AND GIDDINESS: ICD-10-CM

## 2019-04-30 DIAGNOSIS — Z79.2 LONG TERM (CURRENT) USE OF ANTIBIOTICS: ICD-10-CM

## 2019-04-30 DIAGNOSIS — E11.9 TYPE 2 DIABETES MELLITUS WITHOUT COMPLICATIONS: ICD-10-CM

## 2019-04-30 DIAGNOSIS — Z90.49 ACQUIRED ABSENCE OF OTHER SPECIFIED PARTS OF DIGESTIVE TRACT: ICD-10-CM

## 2019-04-30 DIAGNOSIS — Z79.899 OTHER LONG TERM (CURRENT) DRUG THERAPY: ICD-10-CM

## 2019-04-30 DIAGNOSIS — R51 HEADACHE: ICD-10-CM

## 2019-04-30 DIAGNOSIS — Z88.0 ALLERGY STATUS TO PENICILLIN: ICD-10-CM

## 2019-04-30 PROCEDURE — 82962 GLUCOSE BLOOD TEST: CPT

## 2019-04-30 PROCEDURE — 99283 EMERGENCY DEPT VISIT LOW MDM: CPT

## 2019-04-30 PROCEDURE — 99284 EMERGENCY DEPT VISIT MOD MDM: CPT

## 2019-04-30 RX ORDER — ACETAMINOPHEN 500 MG
650 TABLET ORAL ONCE
Qty: 0 | Refills: 0 | Status: COMPLETED | OUTPATIENT
Start: 2019-04-30 | End: 2019-04-30

## 2019-04-30 RX ADMIN — Medication 650 MILLIGRAM(S): at 11:27

## 2019-04-30 NOTE — ED PROVIDER NOTE - CARE PLAN
Principal Discharge DX:	Acute nonintractable headache, unspecified headache type  Secondary Diagnosis:	Dizziness

## 2019-04-30 NOTE — ED ADULT NURSE NOTE - OBJECTIVE STATEMENT
Patient is a 43yo F, arrived to ED via walk-in, AAOx3, in NAD, vitals stable, complaining of generalized HA since Friday with dizziness when moving and R facial "heaviness".  Patient states facial heaviness for six months.  Patient denies any CP, SOB, vision changes, N/V/D, fevers, chills, cough, abdominal pain or any other complaints at this time.  No facial droop, slurred speech or neuro deficits noted.

## 2019-04-30 NOTE — ED PROVIDER NOTE - NSFOLLOWUPINSTRUCTIONS_ED_ALL_ED_FT
Headache    A headache is pain or discomfort felt around the head or neck area. The specific cause of a headache may not be found as there are many types including tension headaches, migraine headaches, and cluster headaches. Watch your condition for any changes. Things you can do to manage your pain include taking over the counter and prescription medications as instructed by your health care provider, lying down in a dark quiet room, limiting stress, getting regular sleep, and refraining from alcohol and tobacco products.    SEEK IMMEDIATE MEDICAL CARE IF YOU HAVE ANY OF THE FOLLOWING SYMPTOMS: fever, vomiting, stiff neck, loss of vision, problems with speech, muscle weakness, loss of balance, trouble walking, passing out, or confusion.     Follow up with neurologist Dr. Lorraine Bowling at [address], [place] [address] - [phone] within 1-3 days.

## 2019-04-30 NOTE — ED PROVIDER NOTE - OBJECTIVE STATEMENT
43 yo F with hx of chronic recurrent dizziness and headaches, s/p extensive clemens in past including multiple brain CTs, CTa head and neck, CT perfusion (all neg), outpt MRI (pt self reports with white matter changes), presenting with headache since awakening this AM at 7AM with dizziness and facial tingling, subjective droopiness but not noticeable by patient's family member this AM.  Pt hx of similar in past.  Denies n/v, neck pain, arm and leg weakness or numbness, or speech changes.  Pt reports neurologist believes symptoms are due to complex migraines.  No meds taken.

## 2019-04-30 NOTE — ED PROVIDER NOTE - CLINICAL SUMMARY MEDICAL DECISION MAKING FREE TEXT BOX
Pt with ss noted above.  Exam nl.  Doubt TIA/stroke.  Pt prior charts reviewed.  Major workups neg for similar in past.  Do not suspect SAH.  ? complex migraines.  Pt requested tylenol only and improved.  Repeat neuro exam nl.  VSS.  Plan dc and outpt fu with neuro.

## 2019-04-30 NOTE — ED ADULT TRIAGE NOTE - OTHER COMPLAINTS
Patient also reports dizziness. Reports symptoms occur daily since Friday. No facial drooping or slurred speech noted.

## 2019-05-07 ENCOUNTER — EMERGENCY (EMERGENCY)
Facility: HOSPITAL | Age: 44
LOS: 1 days | Discharge: HOME | End: 2019-05-07
Attending: EMERGENCY MEDICINE | Admitting: EMERGENCY MEDICINE
Payer: MEDICAID

## 2019-05-07 VITALS
DIASTOLIC BLOOD PRESSURE: 84 MMHG | SYSTOLIC BLOOD PRESSURE: 129 MMHG | TEMPERATURE: 98 F | OXYGEN SATURATION: 98 % | HEART RATE: 73 BPM | RESPIRATION RATE: 18 BRPM

## 2019-05-07 DIAGNOSIS — Z90.49 ACQUIRED ABSENCE OF OTHER SPECIFIED PARTS OF DIGESTIVE TRACT: Chronic | ICD-10-CM

## 2019-05-07 PROCEDURE — 99284 EMERGENCY DEPT VISIT MOD MDM: CPT | Mod: 25

## 2019-05-07 RX ORDER — SODIUM CHLORIDE 9 MG/ML
1000 INJECTION INTRAMUSCULAR; INTRAVENOUS; SUBCUTANEOUS ONCE
Qty: 0 | Refills: 0 | Status: COMPLETED | OUTPATIENT
Start: 2019-05-07 | End: 2019-05-07

## 2019-05-07 RX ORDER — SITAGLIPTIN 50 MG/1
1 TABLET, FILM COATED ORAL
Qty: 0 | Refills: 0 | COMMUNITY

## 2019-05-07 RX ORDER — AMOXICILLIN 250 MG/5ML
0 SUSPENSION, RECONSTITUTED, ORAL (ML) ORAL
Qty: 0 | Refills: 0 | COMMUNITY

## 2019-05-07 RX ORDER — ACETAMINOPHEN 500 MG
975 TABLET ORAL ONCE
Qty: 0 | Refills: 0 | Status: COMPLETED | OUTPATIENT
Start: 2019-05-07 | End: 2019-05-07

## 2019-05-07 RX ADMIN — Medication 975 MILLIGRAM(S): at 23:40

## 2019-05-07 RX ADMIN — SODIUM CHLORIDE 2000 MILLILITER(S): 9 INJECTION INTRAMUSCULAR; INTRAVENOUS; SUBCUTANEOUS at 23:39

## 2019-05-07 NOTE — ED PROVIDER NOTE - NSFOLLOWUPINSTRUCTIONS_ED_ALL_ED_FT
Headache  Gastritis    Use tylenol as directed for pain.  Follow up with your pmd, neurology and GI as planned - call to see if you can be seen sooner.  Add tums/maalox/mylanta etc as directed for additional relief for your stomach symptoms.  Avoid spicy and/or acidic foods (citrus, tomatoes, etc), alcohol, caffeine, and NSAID medications (ibuprofen, aleve, advil, motrin, etc).   Return for increased pain, change in behavior, change in vision/speech/gait, numbness or weakness in extremities, vomiting or diarrhea, fever, black/bloody stools, any other concerns.     Headache    A headache is pain or discomfort felt around the head or neck area. The specific cause of a headache may not be found as there are many types including tension headaches, migraine headaches, and cluster headaches. Watch your condition for any changes. Things you can do to manage your pain include taking over the counter and prescription medications as instructed by your health care provider, lying down in a dark quiet room, limiting stress, getting regular sleep, and refraining from alcohol and tobacco products.    SEEK IMMEDIATE MEDICAL CARE IF YOU HAVE ANY OF THE FOLLOWING SYMPTOMS: fever, vomiting, stiff neck, loss of vision, problems with speech, muscle weakness, loss of balance, trouble walking, passing out, or confusion.     Gastritis    Gastritis is soreness and swelling (inflammation) of the lining of the stomach. Gastritis can develop as a sudden onset (acute) or long-term (chronic) condition. If gastritis is not treated, it can lead to stomach bleeding and ulcers. Causes include viral and bacterial infections, excessive alcohol consumption, tobacco use, or certain medications. Symptoms include nausea, vomiting, or abdominal pain or burning especially after eating. Avoid foods or drinks that make your symptoms worse such as caffeine, chocolate, spicy foods, acidic foods, or alcohol.    SEEK IMMEDIATE MEDICAL CARE IF YOU HAVE ANY OF THE FOLLOWING SYMPTOMS: black or bloody stools, blood or coffee-ground-colored vomitus, worsening abdominal pain, fever, or inability to keep fluids down.

## 2019-05-07 NOTE — ED PROVIDER NOTE - OBJECTIVE STATEMENT
43 yo female h/o gerd, gastritis, dm, chronic recurrent dizziness and headaches, s/p extensive clemens in past including multiple brain CTs, CTa head and neck, CT perfusion (all neg), outpt MRI (pt self reports with white matter changes) 45 yo female h/o gerd, gastritis, dm, chronic recurrent dizziness and headaches, s/p extensive clemens in past including multiple brain CTs, CTa head and neck, CT perfusion (all neg), outpt MRI (pt self reports with white matter changes) c/o burning pain on the top of her head x 2 wk w/o change in vision/speech/gait, numbness or weakness in ext, fever, uri sx although pt does report + nasal congestion.  Pt also notes "jumping" in her abd worsened w food intake.  No n/v/d, change in bowel habits.  Pt s/p cholecystectomy.  Pt reports she has appts in June w gi and neuro.  Pt was on nexium and takes tylenol for her ha w/o sig change in sx. Pt states she recently started therapy since her outpt doctors had not found anything medical as the etiology of her sx and suggested it might be anxiety but pt does not feel this is the case.  Pt denies si, hi.  Pt v worried that her health is affecting her ability to work and care for her children.

## 2019-05-07 NOTE — ED PROVIDER NOTE - CLINICAL SUMMARY MEDICAL DECISION MAKING FREE TEXT BOX
Pt c/o ha w h/o similar ha and abd pain w h/o similar, exam benign other than pt anxious and crying.  I am not concerned for new, acute issue given pt's hpi and review of her records.  Plan labs, ct head.  Pt agreed to tylenol but refused gi meds, anxiolytics, other meds for ha pain.  Reassess after eval.  Likely dc to fu as outpt as planned.

## 2019-05-07 NOTE — ED PROVIDER NOTE - CARE PROVIDER_API CALL
Lorraine Bowling)  Neurology; Vascular Neurology  [address], [address] [place] [address]  Phone: [phone]  Fax: [phone]  Follow Up Time:     Danielle Lyles ()  Internal Medicine; Preventive Medicine  [address] [place] [address]  Phone: [phone]  Fax: [phone]  Follow Up Time:

## 2019-05-08 ENCOUNTER — INBOUND DOCUMENT (OUTPATIENT)
Age: 44
End: 2019-05-08

## 2019-05-08 VITALS
OXYGEN SATURATION: 99 % | TEMPERATURE: 97 F | RESPIRATION RATE: 18 BRPM | SYSTOLIC BLOOD PRESSURE: 129 MMHG | DIASTOLIC BLOOD PRESSURE: 83 MMHG | HEART RATE: 73 BPM

## 2019-05-08 PROCEDURE — 85025 COMPLETE CBC W/AUTO DIFF WBC: CPT

## 2019-05-08 PROCEDURE — 99284 EMERGENCY DEPT VISIT MOD MDM: CPT

## 2019-05-08 PROCEDURE — 83735 ASSAY OF MAGNESIUM: CPT

## 2019-05-08 PROCEDURE — 84702 CHORIONIC GONADOTROPIN TEST: CPT

## 2019-05-08 PROCEDURE — 70450 CT HEAD/BRAIN W/O DYE: CPT

## 2019-05-08 PROCEDURE — 36415 COLL VENOUS BLD VENIPUNCTURE: CPT

## 2019-05-08 PROCEDURE — 85730 THROMBOPLASTIN TIME PARTIAL: CPT

## 2019-05-08 PROCEDURE — 83690 ASSAY OF LIPASE: CPT

## 2019-05-08 PROCEDURE — 85610 PROTHROMBIN TIME: CPT

## 2019-05-08 PROCEDURE — 70450 CT HEAD/BRAIN W/O DYE: CPT | Mod: 26

## 2019-05-08 PROCEDURE — 80053 COMPREHEN METABOLIC PANEL: CPT

## 2019-05-08 RX ADMIN — Medication 975 MILLIGRAM(S): at 00:42

## 2019-05-08 NOTE — ED ADULT NURSE NOTE - CHPI ED NUR SYMPTOMS NEG
no confusion/no change in level of consciousness/no seizure/no loss of consciousness/no blurred vision/no dizziness/no syncope

## 2019-05-08 NOTE — ED ADULT NURSE NOTE - NSIMPLEMENTINTERV_GEN_ALL_ED
Implemented All Universal Safety Interventions:  [place] to call system. Call bell, personal items and telephone within reach. Instruct patient to call for assistance. Room bathroom lighting operational. Non-slip footwear when patient is off stretcher. Physically safe environment: no spills, clutter or unnecessary equipment. Stretcher in lowest position, wheels locked, appropriate side rails in place.

## 2019-05-10 NOTE — ED PROVIDER NOTE - CHPI ED SYMPTOMS NEG
no diarrhea/no chills/no vomiting/no blood in stool/no fever/no hematuria/no nausea/no dysuria/no burning urination No...

## 2019-05-11 DIAGNOSIS — Z90.49 ACQUIRED ABSENCE OF OTHER SPECIFIED PARTS OF DIGESTIVE TRACT: ICD-10-CM

## 2019-05-11 DIAGNOSIS — Z79.899 OTHER LONG TERM (CURRENT) DRUG THERAPY: ICD-10-CM

## 2019-05-11 DIAGNOSIS — Z88.0 ALLERGY STATUS TO PENICILLIN: ICD-10-CM

## 2019-05-11 DIAGNOSIS — E11.9 TYPE 2 DIABETES MELLITUS WITHOUT COMPLICATIONS: ICD-10-CM

## 2019-05-11 DIAGNOSIS — R51 HEADACHE: ICD-10-CM

## 2019-05-11 DIAGNOSIS — K29.70 GASTRITIS, UNSPECIFIED, WITHOUT BLEEDING: ICD-10-CM

## 2019-05-27 ENCOUNTER — EMERGENCY (EMERGENCY)
Facility: HOSPITAL | Age: 44
LOS: 1 days | Discharge: HOME | End: 2019-05-27
Attending: EMERGENCY MEDICINE | Admitting: EMERGENCY MEDICINE
Payer: MEDICAID

## 2019-05-27 VITALS
RESPIRATION RATE: 18 BRPM | SYSTOLIC BLOOD PRESSURE: 124 MMHG | OXYGEN SATURATION: 97 % | HEART RATE: 85 BPM | WEIGHT: 199.96 LBS | DIASTOLIC BLOOD PRESSURE: 82 MMHG

## 2019-05-27 DIAGNOSIS — R10.30 LOWER ABDOMINAL PAIN, UNSPECIFIED: ICD-10-CM

## 2019-05-27 DIAGNOSIS — R30.0 DYSURIA: ICD-10-CM

## 2019-05-27 DIAGNOSIS — Z79.4 LONG TERM (CURRENT) USE OF INSULIN: ICD-10-CM

## 2019-05-27 DIAGNOSIS — M54.5 LOW BACK PAIN: ICD-10-CM

## 2019-05-27 DIAGNOSIS — Z90.49 ACQUIRED ABSENCE OF OTHER SPECIFIED PARTS OF DIGESTIVE TRACT: Chronic | ICD-10-CM

## 2019-05-27 DIAGNOSIS — Z79.899 OTHER LONG TERM (CURRENT) DRUG THERAPY: ICD-10-CM

## 2019-05-27 DIAGNOSIS — Z88.0 ALLERGY STATUS TO PENICILLIN: ICD-10-CM

## 2019-05-27 DIAGNOSIS — E11.9 TYPE 2 DIABETES MELLITUS WITHOUT COMPLICATIONS: ICD-10-CM

## 2019-05-27 PROCEDURE — 85610 PROTHROMBIN TIME: CPT

## 2019-05-27 PROCEDURE — 76830 TRANSVAGINAL US NON-OB: CPT

## 2019-05-27 PROCEDURE — 76830 TRANSVAGINAL US NON-OB: CPT | Mod: 26

## 2019-05-27 PROCEDURE — 76856 US EXAM PELVIC COMPLETE: CPT

## 2019-05-27 PROCEDURE — 36415 COLL VENOUS BLD VENIPUNCTURE: CPT

## 2019-05-27 PROCEDURE — 83735 ASSAY OF MAGNESIUM: CPT

## 2019-05-27 PROCEDURE — 76856 US EXAM PELVIC COMPLETE: CPT | Mod: 26

## 2019-05-27 PROCEDURE — 99284 EMERGENCY DEPT VISIT MOD MDM: CPT | Mod: 25

## 2019-05-27 PROCEDURE — 81003 URINALYSIS AUTO W/O SCOPE: CPT

## 2019-05-27 PROCEDURE — 99284 EMERGENCY DEPT VISIT MOD MDM: CPT

## 2019-05-27 PROCEDURE — 85025 COMPLETE CBC W/AUTO DIFF WBC: CPT

## 2019-05-27 PROCEDURE — 85730 THROMBOPLASTIN TIME PARTIAL: CPT

## 2019-05-27 PROCEDURE — 96374 THER/PROPH/DIAG INJ IV PUSH: CPT

## 2019-05-27 PROCEDURE — 80053 COMPREHEN METABOLIC PANEL: CPT

## 2019-05-27 PROCEDURE — 84702 CHORIONIC GONADOTROPIN TEST: CPT

## 2019-05-27 PROCEDURE — 83690 ASSAY OF LIPASE: CPT

## 2019-05-27 PROCEDURE — 87086 URINE CULTURE/COLONY COUNT: CPT

## 2019-05-27 RX ORDER — KETOROLAC TROMETHAMINE 30 MG/ML
30 SYRINGE (ML) INJECTION ONCE
Refills: 0 | Status: DISCONTINUED | OUTPATIENT
Start: 2019-05-27 | End: 2019-05-27

## 2019-05-27 RX ADMIN — Medication 30 MILLIGRAM(S): at 22:38

## 2019-05-27 NOTE — ED PROVIDER NOTE - ATTENDING CONTRIBUTION TO CARE
Patient presents to ED with concern for low back pain, low abd pain and "pressure in vagina" over the past few weeks.  Had outpatient US completed as requested by her PCP without formal diagnosis other than "swelling."  No fever or chills.  No nausea, vomiting.  On my face to face ED eval, patient is non toxic in appearance.  No abd pain, no flank pain.  No reproducible back pain.  Will check labs, imaging and dispo accordingly.

## 2019-05-27 NOTE — ED PROVIDER NOTE - NSFOLLOWUPCLINICS_GEN_ALL_ED_FT
[facility] [address] [place] [address]  Family Medicine  [address] [place] [address]  Phone: [phone]  Fax: [phone]  Follow Up Time:

## 2019-05-27 NOTE — ED PROVIDER NOTE - CARE PROVIDER_API CALL
Filiberto Hall)  Obstetrics and Gynecology  [address] [id number]  Phone: [phone]  Fax: [phone]  Follow Up Time:

## 2019-05-27 NOTE — ED ADULT NURSE NOTE - OBJECTIVE STATEMENT
Patient presents to the ED with c/o lower abdomen and pelvic pain worse over the last 2 days. states she visit GYN and had testing done which indicated swelling in reproductive area. States pain and burning with urination and states abdominal pain radiates to the back. Denies blood in stool or urine. NAD Noted

## 2019-05-27 NOTE — ED PROVIDER NOTE - CLINICAL SUMMARY MEDICAL DECISION MAKING FREE TEXT BOX
pt c/o suprapubic pressure and some dysuria, w/lbp, states that had an us for same last wk and told that "everything is inflamed" - unable to explain any better, all labs wnl, ua neg, normal pelvic exam, us done and no acute findings, given toradol for her lbp and had complete relief. advised to f/u w/gyn and pmd, pt understands and agrees w/plan

## 2019-05-27 NOTE — ED PROVIDER NOTE - OBJECTIVE STATEMENT
The 43 y/o F, who presents to ED c/o lbp x few wks, some lower abd cramping and dysuria x few d. States that went to see pmd and had a pelvic us and told "everything is inflamed", was supposed to see gyn but decided to come in for 2nd opinion. LBP is constant and dull, aggravated w/mov, non radiating, has not taken any pain meds. Denies vag dc, fevers, chills, hematuria, urgency, flank pain, cp, sob

## 2019-05-28 VITALS
TEMPERATURE: 98 F | SYSTOLIC BLOOD PRESSURE: 115 MMHG | HEART RATE: 81 BPM | DIASTOLIC BLOOD PRESSURE: 67 MMHG | OXYGEN SATURATION: 99 % | RESPIRATION RATE: 16 BRPM

## 2019-05-28 RX ORDER — IBUPROFEN 200 MG
1 TABLET ORAL
Qty: 15 | Refills: 0
Start: 2019-05-28 | End: 2019-06-01

## 2019-06-04 ENCOUNTER — APPOINTMENT (OUTPATIENT)
Dept: NEUROLOGY | Facility: CLINIC | Age: 44
End: 2019-06-04

## 2019-06-16 ENCOUNTER — EMERGENCY (EMERGENCY)
Facility: HOSPITAL | Age: 44
LOS: 1 days | Discharge: HOME | End: 2019-06-16
Attending: EMERGENCY MEDICINE | Admitting: EMERGENCY MEDICINE
Payer: MEDICAID

## 2019-06-16 VITALS
RESPIRATION RATE: 17 BRPM | SYSTOLIC BLOOD PRESSURE: 150 MMHG | HEART RATE: 81 BPM | WEIGHT: 250 LBS | OXYGEN SATURATION: 98 % | TEMPERATURE: 99 F | DIASTOLIC BLOOD PRESSURE: 94 MMHG

## 2019-06-16 DIAGNOSIS — R07.89 OTHER CHEST PAIN: ICD-10-CM

## 2019-06-16 DIAGNOSIS — Z90.49 ACQUIRED ABSENCE OF OTHER SPECIFIED PARTS OF DIGESTIVE TRACT: Chronic | ICD-10-CM

## 2019-06-16 LAB
ALBUMIN SERPL ELPH-MCNC: 3.7 G/DL — SIGNIFICANT CHANGE UP (ref 3.3–5)
ALP SERPL-CCNC: 67 U/L — SIGNIFICANT CHANGE UP (ref 40–120)
ALT FLD-CCNC: 15 U/L — SIGNIFICANT CHANGE UP (ref 10–45)
ANION GAP SERPL CALC-SCNC: 10 MMOL/L — SIGNIFICANT CHANGE UP (ref 5–17)
APTT BLD: 31 SEC — SIGNIFICANT CHANGE UP (ref 27.5–36.3)
AST SERPL-CCNC: 16 U/L — SIGNIFICANT CHANGE UP (ref 10–40)
BASOPHILS # BLD AUTO: 0.01 K/UL — SIGNIFICANT CHANGE UP (ref 0–0.2)
BASOPHILS NFR BLD AUTO: 0.2 % — SIGNIFICANT CHANGE UP (ref 0–2)
BILIRUB SERPL-MCNC: 0.3 MG/DL — SIGNIFICANT CHANGE UP (ref 0.2–1.2)
BUN SERPL-MCNC: 10 MG/DL — SIGNIFICANT CHANGE UP (ref 7–23)
CALCIUM SERPL-MCNC: 9.3 MG/DL — SIGNIFICANT CHANGE UP (ref 8.4–10.5)
CHLORIDE SERPL-SCNC: 99 MMOL/L — SIGNIFICANT CHANGE UP (ref 96–108)
CO2 SERPL-SCNC: 28 MMOL/L — SIGNIFICANT CHANGE UP (ref 22–31)
CREAT SERPL-MCNC: 0.67 MG/DL — SIGNIFICANT CHANGE UP (ref 0.5–1.3)
EOSINOPHIL # BLD AUTO: 0.08 K/UL — SIGNIFICANT CHANGE UP (ref 0–0.5)
EOSINOPHIL NFR BLD AUTO: 1.2 % — SIGNIFICANT CHANGE UP (ref 0–6)
GLUCOSE SERPL-MCNC: 202 MG/DL — HIGH (ref 70–99)
HCT VFR BLD CALC: 35 % — SIGNIFICANT CHANGE UP (ref 34.5–45)
HGB BLD-MCNC: 11.1 G/DL — LOW (ref 11.5–15.5)
IMM GRANULOCYTES NFR BLD AUTO: 0.2 % — SIGNIFICANT CHANGE UP (ref 0–1.5)
INR BLD: 1.02 — SIGNIFICANT CHANGE UP (ref 0.88–1.16)
LYMPHOCYTES # BLD AUTO: 2.3 K/UL — SIGNIFICANT CHANGE UP (ref 1–3.3)
LYMPHOCYTES # BLD AUTO: 34.7 % — SIGNIFICANT CHANGE UP (ref 13–44)
MCHC RBC-ENTMCNC: 28 PG — SIGNIFICANT CHANGE UP (ref 27–34)
MCHC RBC-ENTMCNC: 31.7 GM/DL — LOW (ref 32–36)
MCV RBC AUTO: 88.4 FL — SIGNIFICANT CHANGE UP (ref 80–100)
MONOCYTES # BLD AUTO: 0.55 K/UL — SIGNIFICANT CHANGE UP (ref 0–0.9)
MONOCYTES NFR BLD AUTO: 8.3 % — SIGNIFICANT CHANGE UP (ref 2–14)
NEUTROPHILS # BLD AUTO: 3.68 K/UL — SIGNIFICANT CHANGE UP (ref 1.8–7.4)
NEUTROPHILS NFR BLD AUTO: 55.4 % — SIGNIFICANT CHANGE UP (ref 43–77)
NRBC # BLD: 0 /100 WBCS — SIGNIFICANT CHANGE UP (ref 0–0)
PLATELET # BLD AUTO: 218 K/UL — SIGNIFICANT CHANGE UP (ref 150–400)
POTASSIUM SERPL-MCNC: 4.3 MMOL/L — SIGNIFICANT CHANGE UP (ref 3.5–5.3)
POTASSIUM SERPL-SCNC: 4.3 MMOL/L — SIGNIFICANT CHANGE UP (ref 3.5–5.3)
PROT SERPL-MCNC: 7.6 G/DL — SIGNIFICANT CHANGE UP (ref 6–8.3)
PROTHROM AB SERPL-ACNC: 11.5 SEC — SIGNIFICANT CHANGE UP (ref 10–12.9)
RBC # BLD: 3.96 M/UL — SIGNIFICANT CHANGE UP (ref 3.8–5.2)
RBC # FLD: 13.4 % — SIGNIFICANT CHANGE UP (ref 10.3–14.5)
SODIUM SERPL-SCNC: 137 MMOL/L — SIGNIFICANT CHANGE UP (ref 135–145)
TROPONIN T SERPL-MCNC: <0.01 NG/ML — SIGNIFICANT CHANGE UP (ref 0–0.01)
WBC # BLD: 6.63 K/UL — SIGNIFICANT CHANGE UP (ref 3.8–10.5)
WBC # FLD AUTO: 6.63 K/UL — SIGNIFICANT CHANGE UP (ref 3.8–10.5)

## 2019-06-16 PROCEDURE — 85025 COMPLETE CBC W/AUTO DIFF WBC: CPT

## 2019-06-16 PROCEDURE — 96374 THER/PROPH/DIAG INJ IV PUSH: CPT

## 2019-06-16 PROCEDURE — 99285 EMERGENCY DEPT VISIT HI MDM: CPT | Mod: 25

## 2019-06-16 PROCEDURE — 99284 EMERGENCY DEPT VISIT MOD MDM: CPT | Mod: 25

## 2019-06-16 PROCEDURE — 84484 ASSAY OF TROPONIN QUANT: CPT

## 2019-06-16 PROCEDURE — 85610 PROTHROMBIN TIME: CPT

## 2019-06-16 PROCEDURE — 36415 COLL VENOUS BLD VENIPUNCTURE: CPT

## 2019-06-16 PROCEDURE — 85730 THROMBOPLASTIN TIME PARTIAL: CPT

## 2019-06-16 PROCEDURE — 71045 X-RAY EXAM CHEST 1 VIEW: CPT | Mod: 26

## 2019-06-16 PROCEDURE — 84702 CHORIONIC GONADOTROPIN TEST: CPT

## 2019-06-16 PROCEDURE — 71045 X-RAY EXAM CHEST 1 VIEW: CPT

## 2019-06-16 PROCEDURE — 80053 COMPREHEN METABOLIC PANEL: CPT

## 2019-06-16 PROCEDURE — 83690 ASSAY OF LIPASE: CPT

## 2019-06-16 RX ORDER — KETOROLAC TROMETHAMINE 30 MG/ML
15 SYRINGE (ML) INJECTION ONCE
Refills: 0 | Status: DISCONTINUED | OUTPATIENT
Start: 2019-06-16 | End: 2019-06-16

## 2019-06-16 RX ORDER — LIDOCAINE 4 G/100G
10 CREAM TOPICAL ONCE
Refills: 0 | Status: COMPLETED | OUTPATIENT
Start: 2019-06-16 | End: 2019-06-16

## 2019-06-16 RX ORDER — FAMOTIDINE 10 MG/ML
20 INJECTION INTRAVENOUS ONCE
Refills: 0 | Status: COMPLETED | OUTPATIENT
Start: 2019-06-16 | End: 2019-06-16

## 2019-06-16 RX ORDER — ASPIRIN/CALCIUM CARB/MAGNESIUM 324 MG
162 TABLET ORAL ONCE
Refills: 0 | Status: COMPLETED | OUTPATIENT
Start: 2019-06-16 | End: 2019-06-16

## 2019-06-16 RX ADMIN — FAMOTIDINE 20 MILLIGRAM(S): 10 INJECTION INTRAVENOUS at 20:23

## 2019-06-16 RX ADMIN — Medication 30 MILLILITER(S): at 20:24

## 2019-06-16 RX ADMIN — Medication 162 MILLIGRAM(S): at 20:24

## 2019-06-16 RX ADMIN — LIDOCAINE 10 MILLILITER(S): 4 CREAM TOPICAL at 20:24

## 2019-06-16 NOTE — ED ADULT TRIAGE NOTE - CHIEF COMPLAINT QUOTE
biba from home c/o  midsternal chest pain with palpations while cleaning today.  Given ASA 162mg PO. Hx anxiety, DM

## 2019-06-16 NOTE — ED PROVIDER NOTE - DIAGNOSTIC INTERPRETATION
Chest x-ray interpreted by ER Physician Dr. Castrejon  Findings: heart size/silhouette no sig change from prior, no infiltrates, no pleural effusion, no PTX, no appreciated fracture.

## 2019-06-16 NOTE — ED PROVIDER NOTE - NSFOLLOWUPINSTRUCTIONS_ED_ALL_ED_FT
Immediately return to the Emergency Department or call 911 for any high fever, trouble breathing, severe vomiting, worsening pain, or any other concerns.    You were evaluated for acute cardiac emergency. You had an ekg, a chest xray, and troponin blood tests which did not show an emergency heart problem.     Chest Pain    Chest pain can be caused by many different conditions which may or may not be dangerous. Causes include heartburn, lung infections, heart attack, blood clot in lungs, skin infections, strain or damage to muscle, cartilage, or bones, etc. In addition to a history and physical examination, an electrocardiogram (ECG) or other lab tests may have been performed to determine the cause of your chest pain. Follow up with your primary care provider or with a cardiologist as instructed.     SEEK IMMEDIATE MEDICAL CARE IF YOU HAVE ANY OF THE FOLLOWING SYMPTOMS: worsening chest pain, coughing up blood, unexplained back/neck/jaw pain, severe abdominal pain, dizziness or lightheadedness, fainting, shortness of breath, sweaty or clammy skin, vomiting, or racing heart beat. These symptoms may represent a serious problem that is an emergency. Do not wait to see if the symptoms will go away. Get medical help right away. Call 911 and do not drive yourself to the hospital.

## 2019-06-16 NOTE — ED ADULT NURSE NOTE - OBJECTIVE STATEMENT
AOX4 +ambulatory patient reports she was cleaning her house when she felt mid sternal chest pain and palpitations. Patient took 182 mg ASA at home. Patient denies any shortness of breath, numbness, tingling or weakness.

## 2019-06-16 NOTE — ED PROVIDER NOTE - PHYSICAL EXAMINATION
VITAL SIGNS: I have reviewed nursing notes and confirm.  CONSTITUTIONAL: Well-developed; obese; in no acute distress.  SKIN: Skin is warm and dry, no acute rash.  HEAD: Normocephalic; atraumatic.  EYES:  EOM intact; conjunctiva and sclera clear.  ENT: No nasal discharge; airway clear.  NECK: Supple; Voluntary FROM  CARD: No rubs appreciated, Regular rate and rhythm.  RESP: No wheezes, no rales. No respiratory distress  ABD: Soft; non-distended; non-tender; no rebound or guarding  EXT: Normal ROM. No cyanosis or edema.  NEURO: Alert, oriented. Grossly unremarkable.  PSYCH: Cooperative, mildly anxious

## 2019-06-16 NOTE — ED PROVIDER NOTE - OBJECTIVE STATEMENT
44F pmh DM, h. pylori p/w epigastric this evening while mopping her floor. Similar to prior episodes, took a break, then started mopping again w/ recurrence of pain. Thought might have been a panic attack, so tried to rest, but became more anxious, felt palpitations, checked her home BP and noted sbp 150/94, felt more anxious. No sob, no lightheaded, no fever, no chills, no sick contacts. States this is similar to prior episodes for which she's been seen in ED in jan.

## 2019-06-16 NOTE — ED PROVIDER NOTE - CLINICAL SUMMARY MEDICAL DECISION MAKING FREE TEXT BOX
44F pmh DM, h. pylori p/w epigastric this evening while mopping her floor. Similar to prior episodes, took a break, then started mopping again w/ recurrence of pain. Thought might have been a panic attack, so tried to rest, but became more anxious, felt palpitations, checked her home BP and noted sbp 150/94, felt more anxious. No sob, no lightheaded, no fever, no chills, no sick contacts. States this is similar to prior episodes for which she's been seen in ED in jan. EKG no sig ischemia, cxr w/o acute findings. Less likely acs, no e/o ptx, concern GERD, anxiety, consider esophageal dysmotility. Plan symptomatic control.

## 2019-06-16 NOTE — ED PROVIDER NOTE - PROGRESS NOTE DETAILS
pt offered anxiolysis, pt declined due to concern addition. pt feeling much improved after maalox/lido/pepcid

## 2019-06-17 VITALS
SYSTOLIC BLOOD PRESSURE: 133 MMHG | DIASTOLIC BLOOD PRESSURE: 63 MMHG | RESPIRATION RATE: 18 BRPM | TEMPERATURE: 99 F | HEART RATE: 80 BPM | OXYGEN SATURATION: 98 %

## 2019-06-17 LAB — TROPONIN T SERPL-MCNC: <0.01 NG/ML — SIGNIFICANT CHANGE UP (ref 0–0.01)

## 2019-07-27 ENCOUNTER — EMERGENCY (EMERGENCY)
Facility: HOSPITAL | Age: 44
LOS: 1 days | Discharge: HOME | End: 2019-07-27
Attending: EMERGENCY MEDICINE | Admitting: EMERGENCY MEDICINE
Payer: MEDICAID

## 2019-07-27 VITALS
HEART RATE: 90 BPM | DIASTOLIC BLOOD PRESSURE: 83 MMHG | OXYGEN SATURATION: 97 % | RESPIRATION RATE: 18 BRPM | TEMPERATURE: 98 F | SYSTOLIC BLOOD PRESSURE: 138 MMHG

## 2019-07-27 VITALS
OXYGEN SATURATION: 99 % | SYSTOLIC BLOOD PRESSURE: 125 MMHG | DIASTOLIC BLOOD PRESSURE: 83 MMHG | RESPIRATION RATE: 16 BRPM | TEMPERATURE: 98 F | HEART RATE: 73 BPM

## 2019-07-27 DIAGNOSIS — Z90.49 ACQUIRED ABSENCE OF OTHER SPECIFIED PARTS OF DIGESTIVE TRACT: Chronic | ICD-10-CM

## 2019-07-27 LAB
ALBUMIN SERPL ELPH-MCNC: 4.3 G/DL — SIGNIFICANT CHANGE UP (ref 3.3–5)
ALP SERPL-CCNC: 65 U/L — SIGNIFICANT CHANGE UP (ref 40–120)
ALT FLD-CCNC: 14 U/L — SIGNIFICANT CHANGE UP (ref 10–45)
ANION GAP SERPL CALC-SCNC: 8 MMOL/L — SIGNIFICANT CHANGE UP (ref 5–17)
APPEARANCE UR: CLEAR — SIGNIFICANT CHANGE UP
APTT BLD: 31.2 SEC — SIGNIFICANT CHANGE UP (ref 27.5–36.3)
AST SERPL-CCNC: 13 U/L — SIGNIFICANT CHANGE UP (ref 10–40)
BASOPHILS # BLD AUTO: 0.01 K/UL — SIGNIFICANT CHANGE UP (ref 0–0.2)
BASOPHILS NFR BLD AUTO: 0.1 % — SIGNIFICANT CHANGE UP (ref 0–2)
BILIRUB SERPL-MCNC: 0.2 MG/DL — SIGNIFICANT CHANGE UP (ref 0.2–1.2)
BILIRUB UR-MCNC: NEGATIVE — SIGNIFICANT CHANGE UP
BUN SERPL-MCNC: 11 MG/DL — SIGNIFICANT CHANGE UP (ref 7–23)
CALCIUM SERPL-MCNC: 9.2 MG/DL — SIGNIFICANT CHANGE UP (ref 8.4–10.5)
CHLORIDE SERPL-SCNC: 99 MMOL/L — SIGNIFICANT CHANGE UP (ref 96–108)
CK MB CFR SERPL CALC: 1 NG/ML — SIGNIFICANT CHANGE UP (ref 0–6.7)
CK SERPL-CCNC: 79 U/L — SIGNIFICANT CHANGE UP (ref 25–170)
CO2 SERPL-SCNC: 30 MMOL/L — SIGNIFICANT CHANGE UP (ref 22–31)
COLOR SPEC: YELLOW — SIGNIFICANT CHANGE UP
CREAT SERPL-MCNC: 0.58 MG/DL — SIGNIFICANT CHANGE UP (ref 0.5–1.3)
DIFF PNL FLD: NEGATIVE — SIGNIFICANT CHANGE UP
EOSINOPHIL # BLD AUTO: 0.14 K/UL — SIGNIFICANT CHANGE UP (ref 0–0.5)
EOSINOPHIL NFR BLD AUTO: 1.8 % — SIGNIFICANT CHANGE UP (ref 0–6)
GLUCOSE SERPL-MCNC: 184 MG/DL — HIGH (ref 70–99)
GLUCOSE UR QL: NEGATIVE — SIGNIFICANT CHANGE UP
HCT VFR BLD CALC: 38.4 % — SIGNIFICANT CHANGE UP (ref 34.5–45)
HGB BLD-MCNC: 12.1 G/DL — SIGNIFICANT CHANGE UP (ref 11.5–15.5)
IMM GRANULOCYTES NFR BLD AUTO: 0.3 % — SIGNIFICANT CHANGE UP (ref 0–1.5)
INR BLD: 0.98 — SIGNIFICANT CHANGE UP (ref 0.88–1.16)
KETONES UR-MCNC: NEGATIVE — SIGNIFICANT CHANGE UP
LEUKOCYTE ESTERASE UR-ACNC: NEGATIVE — SIGNIFICANT CHANGE UP
LIDOCAIN IGE QN: 30 U/L — SIGNIFICANT CHANGE UP (ref 7–60)
LYMPHOCYTES # BLD AUTO: 2.7 K/UL — SIGNIFICANT CHANGE UP (ref 1–3.3)
LYMPHOCYTES # BLD AUTO: 34.7 % — SIGNIFICANT CHANGE UP (ref 13–44)
MCHC RBC-ENTMCNC: 28 PG — SIGNIFICANT CHANGE UP (ref 27–34)
MCHC RBC-ENTMCNC: 31.5 GM/DL — LOW (ref 32–36)
MCV RBC AUTO: 88.9 FL — SIGNIFICANT CHANGE UP (ref 80–100)
MONOCYTES # BLD AUTO: 0.55 K/UL — SIGNIFICANT CHANGE UP (ref 0–0.9)
MONOCYTES NFR BLD AUTO: 7.1 % — SIGNIFICANT CHANGE UP (ref 2–14)
NEUTROPHILS # BLD AUTO: 4.37 K/UL — SIGNIFICANT CHANGE UP (ref 1.8–7.4)
NEUTROPHILS NFR BLD AUTO: 56 % — SIGNIFICANT CHANGE UP (ref 43–77)
NITRITE UR-MCNC: NEGATIVE — SIGNIFICANT CHANGE UP
NRBC # BLD: 0 /100 WBCS — SIGNIFICANT CHANGE UP (ref 0–0)
PH UR: 6 — SIGNIFICANT CHANGE UP (ref 5–8)
PLATELET # BLD AUTO: 255 K/UL — SIGNIFICANT CHANGE UP (ref 150–400)
POTASSIUM SERPL-MCNC: 4.4 MMOL/L — SIGNIFICANT CHANGE UP (ref 3.5–5.3)
POTASSIUM SERPL-SCNC: 4.4 MMOL/L — SIGNIFICANT CHANGE UP (ref 3.5–5.3)
PROT SERPL-MCNC: 8.2 G/DL — SIGNIFICANT CHANGE UP (ref 6–8.3)
PROT UR-MCNC: NEGATIVE MG/DL — SIGNIFICANT CHANGE UP
PROTHROM AB SERPL-ACNC: 11.1 SEC — SIGNIFICANT CHANGE UP (ref 10–12.9)
RBC # BLD: 4.32 M/UL — SIGNIFICANT CHANGE UP (ref 3.8–5.2)
RBC # FLD: 13.1 % — SIGNIFICANT CHANGE UP (ref 10.3–14.5)
SODIUM SERPL-SCNC: 137 MMOL/L — SIGNIFICANT CHANGE UP (ref 135–145)
SP GR SPEC: <=1.005 — SIGNIFICANT CHANGE UP (ref 1–1.03)
TROPONIN T SERPL-MCNC: <0.01 NG/ML — SIGNIFICANT CHANGE UP (ref 0–0.01)
TROPONIN T SERPL-MCNC: <0.01 NG/ML — SIGNIFICANT CHANGE UP (ref 0–0.01)
UROBILINOGEN FLD QL: 0.2 E.U./DL — SIGNIFICANT CHANGE UP
WBC # BLD: 7.79 K/UL — SIGNIFICANT CHANGE UP (ref 3.8–10.5)
WBC # FLD AUTO: 7.79 K/UL — SIGNIFICANT CHANGE UP (ref 3.8–10.5)

## 2019-07-27 PROCEDURE — 85379 FIBRIN DEGRADATION QUANT: CPT

## 2019-07-27 PROCEDURE — 96374 THER/PROPH/DIAG INJ IV PUSH: CPT

## 2019-07-27 PROCEDURE — 82553 CREATINE MB FRACTION: CPT

## 2019-07-27 PROCEDURE — 85730 THROMBOPLASTIN TIME PARTIAL: CPT

## 2019-07-27 PROCEDURE — 99285 EMERGENCY DEPT VISIT HI MDM: CPT | Mod: 25

## 2019-07-27 PROCEDURE — 85025 COMPLETE CBC W/AUTO DIFF WBC: CPT

## 2019-07-27 PROCEDURE — 93005 ELECTROCARDIOGRAM TRACING: CPT

## 2019-07-27 PROCEDURE — 84484 ASSAY OF TROPONIN QUANT: CPT

## 2019-07-27 PROCEDURE — 82550 ASSAY OF CK (CPK): CPT

## 2019-07-27 PROCEDURE — 83690 ASSAY OF LIPASE: CPT

## 2019-07-27 PROCEDURE — 36415 COLL VENOUS BLD VENIPUNCTURE: CPT

## 2019-07-27 PROCEDURE — 87086 URINE CULTURE/COLONY COUNT: CPT

## 2019-07-27 PROCEDURE — 80053 COMPREHEN METABOLIC PANEL: CPT

## 2019-07-27 PROCEDURE — 71045 X-RAY EXAM CHEST 1 VIEW: CPT | Mod: 26

## 2019-07-27 PROCEDURE — 71045 X-RAY EXAM CHEST 1 VIEW: CPT

## 2019-07-27 PROCEDURE — 93010 ELECTROCARDIOGRAM REPORT: CPT

## 2019-07-27 PROCEDURE — 85610 PROTHROMBIN TIME: CPT

## 2019-07-27 PROCEDURE — 81003 URINALYSIS AUTO W/O SCOPE: CPT

## 2019-07-27 PROCEDURE — 96375 TX/PRO/DX INJ NEW DRUG ADDON: CPT

## 2019-07-27 PROCEDURE — 99284 EMERGENCY DEPT VISIT MOD MDM: CPT | Mod: 25

## 2019-07-27 RX ORDER — FAMOTIDINE 10 MG/ML
20 INJECTION INTRAVENOUS ONCE
Refills: 0 | Status: COMPLETED | OUTPATIENT
Start: 2019-07-27 | End: 2019-07-27

## 2019-07-27 RX ORDER — SUCRALFATE 1 G
1 TABLET ORAL
Qty: 28 | Refills: 0
Start: 2019-07-27 | End: 2019-08-02

## 2019-07-27 RX ORDER — SUCRALFATE 1 G
1 TABLET ORAL ONCE
Refills: 0 | Status: COMPLETED | OUTPATIENT
Start: 2019-07-27 | End: 2019-07-27

## 2019-07-27 RX ORDER — METOCLOPRAMIDE HCL 10 MG
10 TABLET ORAL ONCE
Refills: 0 | Status: COMPLETED | OUTPATIENT
Start: 2019-07-27 | End: 2019-07-27

## 2019-07-27 RX ORDER — SODIUM CHLORIDE 9 MG/ML
1000 INJECTION INTRAMUSCULAR; INTRAVENOUS; SUBCUTANEOUS
Refills: 0 | Status: DISCONTINUED | OUTPATIENT
Start: 2019-07-27 | End: 2019-07-31

## 2019-07-27 RX ADMIN — Medication 1 GRAM(S): at 12:23

## 2019-07-27 RX ADMIN — Medication 10 MILLIGRAM(S): at 10:00

## 2019-07-27 RX ADMIN — SODIUM CHLORIDE 125 MILLILITER(S): 9 INJECTION INTRAMUSCULAR; INTRAVENOUS; SUBCUTANEOUS at 10:01

## 2019-07-27 RX ADMIN — FAMOTIDINE 20 MILLIGRAM(S): 10 INJECTION INTRAVENOUS at 10:00

## 2019-07-27 NOTE — ED PROVIDER NOTE - NSFOLLOWUPINSTRUCTIONS_ED_ALL_ED_FT
Abdominal Pain, Adult  Image   Many things can cause belly (abdominal) pain. Most times, belly pain is not dangerous. Many cases of belly pain can be watched and treated at home. Sometimes belly pain is serious, though. Your doctor will try to find the cause of your belly pain.    Follow these instructions at home:  Take over-the-counter and prescription medicines only as told by your doctor. Do not take medicines that help you poop (laxatives) unless told to by your doctor.  Drink enough fluid to keep your pee (urine) clear or pale yellow.  Watch your belly pain for any changes.  Keep all follow-up visits as told by your doctor. This is important.  Contact a doctor if:  Your belly pain changes or gets worse.  You are not hungry, or you lose weight without trying.  You are having trouble pooping (constipated) or have watery poop (diarrhea) for more than 2–3 days.  You have pain when you pee or poop.  Your belly pain wakes you up at night.  Your pain gets worse with meals, after eating, or with certain foods.  You are throwing up and cannot keep anything down.  You have a fever.  Get help right away if:  Your pain does not go away as soon as your doctor says it should.  You cannot stop throwing up.  Your pain is only in areas of your belly, such as the right side or the left lower part of the belly.  You have bloody or black poop, or poop that looks like tar.  You have very bad pain, cramping, or bloating in your belly.  You have signs of not having enough fluid or water in your body (dehydration), such as:  Dark pee, very little pee, or no pee.  Cracked lips.  Dry mouth.  Sunken eyes.  Sleepiness.  Weakness.  This information is not intended to replace advice given to you by your health care provider. Make sure you discuss any questions you have with your health care provider.

## 2019-07-27 NOTE — ED PROVIDER NOTE - PROGRESS NOTE DETAILS
pt feeling much better davian po-  no abd pain smiling alert awake- pt feeling much better- davian po  no abd pain smiling alert awake- no obvious signs to suggest adenitis or parotid inflammation on exam for right ear complaints- no AOM- small effusion- rec to see her pmd for ent referral in 1-2 days - rx carafate for her GI sx as well as low fat low residue diet

## 2019-07-27 NOTE — ED PROVIDER NOTE - CLINICAL SUMMARY MEDICAL DECISION MAKING FREE TEXT BOX
45 yo F with mild epigastric pain radiating to chest  x 1 day  no sob  no N/V  has mild chronic right ear discomfort slight effusion   TM  wnl no swelling externally  no ekg changes trop neg  x 2   pt feeling better after pepcid and reglan fluids  rec fu with pmd in 2 days  or ER if any

## 2019-07-27 NOTE — ED PROVIDER NOTE - OBJECTIVE STATEMENT
43 yo F ex smoker 1ppd  x 20 years quit 5 years ago with hx H pylori ? treated GERD DM c/o of epigastric pain radiating up to chest and also to right neck since last night- mild nausea no sob- no cough - also having ? mild right lateral neck swelling slight ear pain x 1 week  no sore throat or fevers  no vomiting had a CT abd pelvis 2 weeks ago ? result - has had prior EGDs in the past showing GERD-  no melena or hematochezia  no constipation no fevers or chills - dec po intake since she is having pain did not have dinner or breakfast this am no hx of DVT or PE 45 yo F ex smoker 1ppd  x 20 years quit 5 years ago with hx H pylori treated 3-4 months ago- GERD DM c/o of epigastric pain radiating up to chest and also to right neck since last night- mild nausea no sob- no cough - also having ? mild right lateral neck swelling slight ear pain x 1 week  no sore throat or fevers  no vomiting had a CT abd pelvis 2 weeks ago ? result - has had prior EGDs in the past showing GERD-  no melena or hematochezia  no constipation no fevers or chills - dec po intake since she is having pain did not have dinner or breakfast this am no hx of DVT or PE 43 yo F ex smoker prior lap choly hx of gallstones 1ppd  x 20 years quit 5 years ago with hx H pylori treated 3-4 months ago- GERD DM c/o of epigastric pain radiating up to chest and also to right neck since last night- mild nausea no sob- no cough - also having ? mild right lateral neck swelling slight ear pain x 1 week  no sore throat or fevers  no vomiting had a CT abd pelvis 2 weeks ago ? result - has had prior EGDs in the past showing GERD-  no melena or hematochezia  no constipation no fevers or chills - dec po intake since she is having pain did not have dinner or breakfast this am no hx of DVT or PE

## 2019-07-27 NOTE — ED ADULT NURSE NOTE - OBJECTIVE STATEMENT
Pt c/o epigastric pain radiating up the middle of her chest, describes as burning and spasms since yesterday, but has had these episodes for months. Pt also c/o tenderness to the R side of her neck. Pt also states she was a little nausea earlier and also felt SOB this morning, but not now. Pt speaking clearly, no acute distress. Pt has hx of DM with Metformin, and GERD, takes Zantac. Pt had a CT Abd a couple of weeks ago in [facility], and had an endoscopy about 5-6 months ago.

## 2019-07-27 NOTE — ED ADULT TRIAGE NOTE - CHIEF COMPLAINT QUOTE
DELORES, complaining of abdominal pain radiating up to chest and head since yesterday, reports that she has an endoscopy done which was negative. Denies any nausea, vomiting or diarrhea.

## 2019-07-27 NOTE — ED PROVIDER NOTE - NSFOLLOWUPCLINICS_GEN_ALL_ED_FT
[facility]  Family Medicine  [address]  [place], [place] [address]  Phone: [phone]  Fax:   Follow Up Time: 1-3 Days

## 2019-07-28 LAB
CULTURE RESULTS: NO GROWTH — SIGNIFICANT CHANGE UP
SPECIMEN SOURCE: SIGNIFICANT CHANGE UP

## 2019-07-29 ENCOUNTER — EMERGENCY (EMERGENCY)
Facility: HOSPITAL | Age: 44
LOS: 1 days | Discharge: HOME | End: 2019-07-29
Attending: EMERGENCY MEDICINE | Admitting: EMERGENCY MEDICINE
Payer: MEDICAID

## 2019-07-29 VITALS
WEIGHT: 251.99 LBS | SYSTOLIC BLOOD PRESSURE: 141 MMHG | OXYGEN SATURATION: 96 % | HEIGHT: 65 IN | HEART RATE: 91 BPM | DIASTOLIC BLOOD PRESSURE: 86 MMHG | RESPIRATION RATE: 18 BRPM | TEMPERATURE: 99 F

## 2019-07-29 VITALS
SYSTOLIC BLOOD PRESSURE: 127 MMHG | HEART RATE: 82 BPM | TEMPERATURE: 98 F | OXYGEN SATURATION: 96 % | RESPIRATION RATE: 18 BRPM | DIASTOLIC BLOOD PRESSURE: 84 MMHG

## 2019-07-29 DIAGNOSIS — Z90.49 ACQUIRED ABSENCE OF OTHER SPECIFIED PARTS OF DIGESTIVE TRACT: Chronic | ICD-10-CM

## 2019-07-29 LAB
APPEARANCE UR: CLEAR — SIGNIFICANT CHANGE UP
BILIRUB UR-MCNC: NEGATIVE — SIGNIFICANT CHANGE UP
COLOR SPEC: YELLOW — SIGNIFICANT CHANGE UP
DIFF PNL FLD: NEGATIVE — SIGNIFICANT CHANGE UP
GLUCOSE UR QL: NEGATIVE — SIGNIFICANT CHANGE UP
KETONES UR-MCNC: NEGATIVE — SIGNIFICANT CHANGE UP
LEUKOCYTE ESTERASE UR-ACNC: NEGATIVE — SIGNIFICANT CHANGE UP
NITRITE UR-MCNC: NEGATIVE — SIGNIFICANT CHANGE UP
PH UR: 6.5 — SIGNIFICANT CHANGE UP (ref 5–8)
PROT UR-MCNC: NEGATIVE MG/DL — SIGNIFICANT CHANGE UP
SP GR SPEC: 1.01 — SIGNIFICANT CHANGE UP (ref 1–1.03)
UROBILINOGEN FLD QL: 0.2 E.U./DL — SIGNIFICANT CHANGE UP

## 2019-07-29 PROCEDURE — 99284 EMERGENCY DEPT VISIT MOD MDM: CPT

## 2019-07-29 PROCEDURE — 99283 EMERGENCY DEPT VISIT LOW MDM: CPT | Mod: 25

## 2019-07-29 PROCEDURE — 93010 ELECTROCARDIOGRAM REPORT: CPT

## 2019-07-29 PROCEDURE — 93005 ELECTROCARDIOGRAM TRACING: CPT

## 2019-07-29 PROCEDURE — 81003 URINALYSIS AUTO W/O SCOPE: CPT

## 2019-07-29 PROCEDURE — 87086 URINE CULTURE/COLONY COUNT: CPT

## 2019-07-29 RX ORDER — ONDANSETRON 8 MG/1
4 TABLET, FILM COATED ORAL ONCE
Refills: 0 | Status: COMPLETED | OUTPATIENT
Start: 2019-07-29 | End: 2019-07-29

## 2019-07-29 RX ORDER — LIDOCAINE 4 G/100G
10 CREAM TOPICAL ONCE
Refills: 0 | Status: COMPLETED | OUTPATIENT
Start: 2019-07-29 | End: 2019-07-29

## 2019-07-29 RX ORDER — FAMOTIDINE 10 MG/ML
20 INJECTION INTRAVENOUS ONCE
Refills: 0 | Status: COMPLETED | OUTPATIENT
Start: 2019-07-29 | End: 2019-07-29

## 2019-07-29 RX ADMIN — ONDANSETRON 4 MILLIGRAM(S): 8 TABLET, FILM COATED ORAL at 17:12

## 2019-07-29 RX ADMIN — Medication 30 MILLILITER(S): at 17:12

## 2019-07-29 RX ADMIN — LIDOCAINE 10 MILLILITER(S): 4 CREAM TOPICAL at 17:12

## 2019-07-29 RX ADMIN — FAMOTIDINE 20 MILLIGRAM(S): 10 INJECTION INTRAVENOUS at 17:12

## 2019-07-29 NOTE — ED PROVIDER NOTE - NSFOLLOWUPCLINICS_GEN_ALL_ED_FT
[facility]  Family Medicine  [address]  [place], [place] [address]  Phone: [phone]  Fax:   Follow Up Time: 4-6 Days

## 2019-07-29 NOTE — ED ADULT NURSE NOTE - OBJECTIVE STATEMENT
Pt came to ED complaining of upper abd pain, nausea, diarrhea, polyuria since this morning.  PT denies chest pain, SOB.  Pt reports liquid stools

## 2019-07-29 NOTE — ED PROVIDER NOTE - NSFOLLOWUPINSTRUCTIONS_ED_ALL_ED_FT
Please follow up with your primary care doctor and gastroenterologist in 4-7 days.     Diarrhea    Diarrhea is frequent loose or watery bowel movements that has many causes. Diarrhea can make you feel weak and cause you to become dehydrated. Diarrhea typically lasts 2–3 days, but can last longer if it is a sign of something more serious. Drink clear fluids to prevent dehydration. Eat bland, easy-to-digest foods as tolerated.     SEEK IMMEDIATE MEDICAL CARE IF YOU HAVE ANY OF THE FOLLOWING SYMPTOMS: high fevers, lightheadedness/dizziness, chest pain, black or bloody stools, shortness of breath, severe abdominal or back pain, or any signs of dehydration.    Heartburn  Image   Heartburn is a type of pain or discomfort that can happen in the throat or chest. It is often described as a burning pain. It may also cause a bad taste in the mouth. Heartburn may feel worse when you lie down or bend over, and it is often worse at night. Heartburn may be caused by stomach contents that move back up into the esophagus (reflux).    Follow these instructions at home:  Take these actions to decrease your discomfort and to help avoid complications.    Diet     Follow a diet as recommended by your health care provider. This may involve avoiding foods and drinks such as:  Coffee and tea (with or without caffeine).  Drinks that contain alcohol.  Energy drinks and sports drinks.  Carbonated drinks or sodas.  Chocolate and cocoa.  Peppermint and mint flavorings.  Garlic and onions.  Horseradish.  Spicy and acidic foods, including peppers, chili powder, rose powder, vinegar, hot sauces, and barbecue sauce.  Citrus fruit juices and citrus fruits, such as oranges, romie, and limes.  Tomato-based foods, such as red sauce, chili, salsa, and pizza with red sauce.  Fried and fatty foods, such as donuts, french fries, potato chips, and high-fat dressings.  High-fat meats, such as hot dogs and fatty cuts of red and white meats, such as rib eye steak, sausage, ham, and marin.  High-fat dairy items, such as whole milk, butter, and cream cheese.  Eat small, frequent meals instead of large meals.  Avoid drinking large amounts of liquid with your meals.  Avoid eating meals during the 2–3 hours before bedtime.  Avoid lying down right after you eat.  Do not exercise right after you eat.  General instructions     Pay attention to any changes in your symptoms.  Take over-the-counter and prescription medicines only as told by your health care provider. Do not take aspirin, ibuprofen, or other NSAIDs unless your health care provider told you to do so.  Do not use any tobacco products, including cigarettes, chewing tobacco, and e-cigarettes. If you need help quitting, ask your health care provider.  Wear loose-fitting clothing. Do not wear anything tight around your waist that causes pressure on your abdomen.  Raise (elevate) the head of your bed about 6 inches (15 cm).  Try to reduce your stress, such as with yoga or meditation. If you need help reducing stress, ask your health care provider.  If you are overweight, reduce your weight to an amount that is healthy for you. Ask your health care provider for guidance about a safe weight loss goal.  Keep all follow-up visits as told by your health care provider. This is important.  Contact a health care provider if:  You have new symptoms.  You have unexplained weight loss.  You have difficulty swallowing, or it hurts to swallow.  You have wheezing or a persistent cough.  Your symptoms do not improve with treatment.  You have frequent heartburn for more than two weeks.  Get help right away if:  You have pain in your arms, neck, jaw, teeth, or back.  You feel sweaty, dizzy, or light-headed.  You have chest pain or shortness of breath.  You vomit and your vomit looks like blood or coffee grounds.  Your stool is bloody or black.  This information is not intended to replace advice given to you by your health care provider. Make sure you discuss any questions you have with your health care provider.      Abdominal Pain    Many things can cause abdominal pain. Many times, abdominal pain is not caused by a disease and will improve without treatment. Your health care provider will do a physical exam to determine if there is a dangerous cause of your pain; blood tests and imaging may help determine the cause of your pain. However, in many cases, no cause may be found and you may need further testing as an outpatient. Monitor your abdominal pain for any changes.     SEEK IMMEDIATE MEDICAL CARE IF YOU HAVE ANY OF THE FOLLOWING SYMPTOMS: worsening abdominal pain, uncontrollable vomiting, profuse diarrhea, inability to have bowel movements or pass gas, black or bloody stools, fever accompanying chest pain or back pain, or fainting. These symptoms may represent a serious problem that is an emergency. Do not wait to see if the symptoms will go away. Get medical help right away. Call 911 and do not drive yourself to the hospital.

## 2019-07-29 NOTE — ED PROVIDER NOTE - OBJECTIVE STATEMENT
gaviscon, maalox and Zantac today diarrhea, spasms pain to upper abd and chest and "funny tase om mouth and had GI with study done 2 weeks ago, hx of jeana smith and kvng kohli 45 yo F with PMH of NIDDM, ex smoker (1 ppd X 20 years, quit 5 years ago), PSH of lap cain and hx of gallstones, GERD on chronic daily meds of Gaviscon, Zantac and Maalox with PMH of H pylori treated 3-4 months ago and previous upper endoscopies/ EGDs showing GERD p/w "spasm" like epigastric pain radiating up to chest with watery nb diarrhea worsening since this am and "funny taste" in her mouth. (+) nausea. No vomiting. Took Gaviscon, Maalox and Zantac today with no relief. Saw her GI doctor (can't remember name) for similar sxs, two weeks ago, and had CT scan, but does not know results. No fevers/ chills. No melena or hematochezia. NO SOB. Of note, pt was seen at [facility] ED for these same symptoms 2 days ago and had blood work including trop X 2, CXR, UA which were all unremarkable (reviewed by me). Pt states that she has not really felt better since that ED visit.

## 2019-07-29 NOTE — ED PROVIDER NOTE - CARE PLAN
Principal Discharge DX:	Diarrhea  Secondary Diagnosis:	Abdominal pain  Secondary Diagnosis:	GERD (gastroesophageal reflux disease)

## 2019-07-29 NOTE — ED ADULT NURSE NOTE - CHPI ED NUR SYMPTOMS NEG
no hematuria/no abdominal distension/no blood in stool/no burning urination/no fever/no chills/no dysuria

## 2019-07-29 NOTE — ED PROVIDER NOTE - CLINICAL SUMMARY MEDICAL DECISION MAKING FREE TEXT BOX
45 yo F with PMH of NIDDM, ex smoker (1 ppd X 20 years, quit 5 years ago), PSH of lap cain and hx of gallstones, GERD on chronic daily meds of Gaviscon, Zantac and Maalox with PMH of H pylori treated 3-4 months ago and previous upper endoscopies/ EGDs showing GERD p/w "spasm" like epigastric pain radiating up to chest with watery nb diarrhea worsening since this am and "funny taste" in her mouth. (+) nausea. No vomiting. Took Gaviscon, Maalox and Zantac today with no relief. Saw her GI doctor (can't remember name) for similar sxs, two weeks ago, and had CT scan, but does not know results. No fevers/ chills. No melena or hematochezia. NO SOB. Of note, pt was seen at [facility] ED for these same symptoms 2 days ago and had blood work including trop X 2, CXR, UA which were all unremarkable (reviewed by me). Pt states that she has not really felt better since that ED visit. 45 yo F with PMH of NIDDM, ex smoker (1 ppd X 20 years, quit 5 years ago), PSH of lap cain and hx of gallstones, GERD on chronic daily meds of Gaviscon, Zantac and Maalox with PMH of H pylori treated 3-4 months ago and previous upper endoscopies/ EGDs showing GERD p/w "spasm" like epigastric pain radiating up to chest with watery nb diarrhea worsening since this am and "funny taste" in her mouth. (+) nausea. No vomiting. Took Gaviscon, Maalox and Zantac today with no relief. Saw her GI doctor (can't remember name) for similar sxs, two weeks ago, and had CT scan, but does not know results. No fevers/ chills. No melena or hematochezia. NO SOB. Of note, pt was seen at [facility] ED for these same symptoms 2 days ago and had blood work including trop X 2, CXR, UA which were all unremarkable (reviewed by me). Pt states that she has not really felt better since that ED visit. Given GI cocktail with some alleviation in sxs. UA normal. To f/up outpt with GI and PCP. Repeat abd exam is soft and NT.

## 2019-07-29 NOTE — ED ADULT NURSE NOTE - NSIMPLEMENTINTERV_GEN_ALL_ED
Implemented All Universal Safety Interventions:  [place] to call system. Call bell, personal items and telephone within reach. Instruct patient to call for assistance. Room bathroom lighting operational. Non-slip footwear when patient is off stretcher. Physically safe environment: no spills, clutter or unnecessary equipment. Stretcher in lowest position, wheels locked, appropriate side rails in place.

## 2019-07-30 LAB
CULTURE RESULTS: NO GROWTH — SIGNIFICANT CHANGE UP
SPECIMEN SOURCE: SIGNIFICANT CHANGE UP

## 2019-07-31 DIAGNOSIS — E11.9 TYPE 2 DIABETES MELLITUS WITHOUT COMPLICATIONS: ICD-10-CM

## 2019-07-31 DIAGNOSIS — R07.9 CHEST PAIN, UNSPECIFIED: ICD-10-CM

## 2019-07-31 DIAGNOSIS — Z87.891 PERSONAL HISTORY OF NICOTINE DEPENDENCE: ICD-10-CM

## 2019-07-31 DIAGNOSIS — R11.0 NAUSEA: ICD-10-CM

## 2019-07-31 DIAGNOSIS — Z88.0 ALLERGY STATUS TO PENICILLIN: ICD-10-CM

## 2019-07-31 DIAGNOSIS — R10.9 UNSPECIFIED ABDOMINAL PAIN: ICD-10-CM

## 2019-07-31 DIAGNOSIS — R10.13 EPIGASTRIC PAIN: ICD-10-CM

## 2019-07-31 DIAGNOSIS — Z79.84 LONG TERM (CURRENT) USE OF ORAL HYPOGLYCEMIC DRUGS: ICD-10-CM

## 2019-08-02 DIAGNOSIS — R10.13 EPIGASTRIC PAIN: ICD-10-CM

## 2019-08-02 DIAGNOSIS — K21.9 GASTRO-ESOPHAGEAL REFLUX DISEASE WITHOUT ESOPHAGITIS: ICD-10-CM

## 2019-08-06 ENCOUNTER — EMERGENCY (EMERGENCY)
Facility: HOSPITAL | Age: 44
LOS: 1 days | Discharge: HOME | End: 2019-08-06
Attending: EMERGENCY MEDICINE | Admitting: EMERGENCY MEDICINE
Payer: MEDICAID

## 2019-08-06 VITALS
DIASTOLIC BLOOD PRESSURE: 86 MMHG | HEIGHT: 65 IN | SYSTOLIC BLOOD PRESSURE: 137 MMHG | WEIGHT: 250 LBS | TEMPERATURE: 98 F | RESPIRATION RATE: 18 BRPM | OXYGEN SATURATION: 98 % | HEART RATE: 86 BPM

## 2019-08-06 DIAGNOSIS — Z90.49 ACQUIRED ABSENCE OF OTHER SPECIFIED PARTS OF DIGESTIVE TRACT: Chronic | ICD-10-CM

## 2019-08-06 LAB
ALBUMIN SERPL ELPH-MCNC: 3.8 G/DL — SIGNIFICANT CHANGE UP (ref 3.3–5)
ALP SERPL-CCNC: 69 U/L — SIGNIFICANT CHANGE UP (ref 40–120)
ALT FLD-CCNC: 23 U/L — SIGNIFICANT CHANGE UP (ref 10–45)
ANION GAP SERPL CALC-SCNC: 11 MMOL/L — SIGNIFICANT CHANGE UP (ref 5–17)
AST SERPL-CCNC: 22 U/L — SIGNIFICANT CHANGE UP (ref 10–40)
BASOPHILS # BLD AUTO: 0.01 K/UL — SIGNIFICANT CHANGE UP (ref 0–0.2)
BASOPHILS NFR BLD AUTO: 0.1 % — SIGNIFICANT CHANGE UP (ref 0–2)
BILIRUB SERPL-MCNC: 0.2 MG/DL — SIGNIFICANT CHANGE UP (ref 0.2–1.2)
BUN SERPL-MCNC: 7 MG/DL — SIGNIFICANT CHANGE UP (ref 7–23)
CALCIUM SERPL-MCNC: 8.9 MG/DL — SIGNIFICANT CHANGE UP (ref 8.4–10.5)
CHLORIDE SERPL-SCNC: 97 MMOL/L — SIGNIFICANT CHANGE UP (ref 96–108)
CO2 SERPL-SCNC: 28 MMOL/L — SIGNIFICANT CHANGE UP (ref 22–31)
CREAT SERPL-MCNC: 0.59 MG/DL — SIGNIFICANT CHANGE UP (ref 0.5–1.3)
EOSINOPHIL # BLD AUTO: 0.09 K/UL — SIGNIFICANT CHANGE UP (ref 0–0.5)
EOSINOPHIL NFR BLD AUTO: 1.3 % — SIGNIFICANT CHANGE UP (ref 0–6)
GLUCOSE SERPL-MCNC: 250 MG/DL — HIGH (ref 70–99)
HCG SERPL-ACNC: <0 MIU/ML — SIGNIFICANT CHANGE UP
HCT VFR BLD CALC: 38.5 % — SIGNIFICANT CHANGE UP (ref 34.5–45)
HGB BLD-MCNC: 12.1 G/DL — SIGNIFICANT CHANGE UP (ref 11.5–15.5)
IMM GRANULOCYTES NFR BLD AUTO: 0.1 % — SIGNIFICANT CHANGE UP (ref 0–1.5)
LIDOCAIN IGE QN: 22 U/L — SIGNIFICANT CHANGE UP (ref 7–60)
LYMPHOCYTES # BLD AUTO: 2.78 K/UL — SIGNIFICANT CHANGE UP (ref 1–3.3)
LYMPHOCYTES # BLD AUTO: 40.5 % — SIGNIFICANT CHANGE UP (ref 13–44)
MAGNESIUM SERPL-MCNC: 1.7 MG/DL — SIGNIFICANT CHANGE UP (ref 1.6–2.6)
MCHC RBC-ENTMCNC: 27.8 PG — SIGNIFICANT CHANGE UP (ref 27–34)
MCHC RBC-ENTMCNC: 31.4 GM/DL — LOW (ref 32–36)
MCV RBC AUTO: 88.3 FL — SIGNIFICANT CHANGE UP (ref 80–100)
MONOCYTES # BLD AUTO: 0.61 K/UL — SIGNIFICANT CHANGE UP (ref 0–0.9)
MONOCYTES NFR BLD AUTO: 8.9 % — SIGNIFICANT CHANGE UP (ref 2–14)
NEUTROPHILS # BLD AUTO: 3.37 K/UL — SIGNIFICANT CHANGE UP (ref 1.8–7.4)
NEUTROPHILS NFR BLD AUTO: 49.1 % — SIGNIFICANT CHANGE UP (ref 43–77)
NRBC # BLD: 0 /100 WBCS — SIGNIFICANT CHANGE UP (ref 0–0)
PLATELET # BLD AUTO: 241 K/UL — SIGNIFICANT CHANGE UP (ref 150–400)
POTASSIUM SERPL-MCNC: 4.1 MMOL/L — SIGNIFICANT CHANGE UP (ref 3.5–5.3)
POTASSIUM SERPL-SCNC: 4.1 MMOL/L — SIGNIFICANT CHANGE UP (ref 3.5–5.3)
PROT SERPL-MCNC: 7.8 G/DL — SIGNIFICANT CHANGE UP (ref 6–8.3)
RBC # BLD: 4.36 M/UL — SIGNIFICANT CHANGE UP (ref 3.8–5.2)
RBC # FLD: 12.8 % — SIGNIFICANT CHANGE UP (ref 10.3–14.5)
SODIUM SERPL-SCNC: 136 MMOL/L — SIGNIFICANT CHANGE UP (ref 135–145)
WBC # BLD: 6.87 K/UL — SIGNIFICANT CHANGE UP (ref 3.8–10.5)
WBC # FLD AUTO: 6.87 K/UL — SIGNIFICANT CHANGE UP (ref 3.8–10.5)

## 2019-08-06 PROCEDURE — 71046 X-RAY EXAM CHEST 2 VIEWS: CPT | Mod: 26

## 2019-08-06 PROCEDURE — 93010 ELECTROCARDIOGRAM REPORT: CPT

## 2019-08-06 PROCEDURE — 99285 EMERGENCY DEPT VISIT HI MDM: CPT | Mod: 25

## 2019-08-06 RX ORDER — FAMOTIDINE 10 MG/ML
20 INJECTION INTRAVENOUS ONCE
Refills: 0 | Status: COMPLETED | OUTPATIENT
Start: 2019-08-06 | End: 2019-08-06

## 2019-08-06 RX ORDER — SODIUM CHLORIDE 9 MG/ML
1000 INJECTION INTRAMUSCULAR; INTRAVENOUS; SUBCUTANEOUS ONCE
Refills: 0 | Status: COMPLETED | OUTPATIENT
Start: 2019-08-06 | End: 2019-08-06

## 2019-08-06 RX ADMIN — SODIUM CHLORIDE 1000 MILLILITER(S): 9 INJECTION INTRAMUSCULAR; INTRAVENOUS; SUBCUTANEOUS at 22:52

## 2019-08-06 RX ADMIN — FAMOTIDINE 20 MILLIGRAM(S): 10 INJECTION INTRAVENOUS at 22:55

## 2019-08-06 NOTE — ED PROVIDER NOTE - NSFOLLOWUPINSTRUCTIONS_ED_ALL_ED_FT
Esophageal Spasm    An esophageal spasm is a sudden tightening (contraction) of the part of the body that moves food from the mouth to the stomach (esophagus). Normally, smooth, wave-like muscle contractions move food and liquids down the esophagus. Esophageal spasms are abnormal muscle contractions that can cause chest pain and trouble swallowing (dysphagia). Spasms may also cause swallowed foods or liquids to come back up into the throat (regurgitation).    There are two types of esophageal spasms. You may have one or both types:  Diffuse esophageal spasms. These are irregular, uncoordinated spasms. This type tends to cause more dysphagia.  Nutcracker esophagus. This is a type of spasm in which the muscles move normally, but the contraction is very strong. This type tends to be more painful.  Severe esophageal spasms can make it hard to eat and do everyday activities. They often occur with severe heartburn (reflux esophagitis). The symptoms can come and go and may be triggered or worsened depending on your diet or other medical issues.    What are the causes?  The cause of esophageal spasms is not known.    What increases the risk?  The following factors may make you more likely to develop esophageal spasms:  Being female.  Age. The risk may increase as you get older.  Depression or anxiety.  Having GERD (gastroesophageal reflux disease).  What are the signs or symptoms?  Symptoms may vary from day to day. They may be mild or severe. They may last for minutes or hours. Common symptoms include:  Chest pain. This may feel like a heart attack.  Back pain.  Dysphagia.  Heartburn.  A feeling that something is stuck in the throat (globus).  Regurgitation of foods or liquids.  For some people, certain things may trigger symptoms, such as:  Certain foods and drinks. These may include very hot or very cold foods or drinks.  Eating very quickly.  How is this diagnosed?  This condition may be diagnosed based on your symptoms and a physical exam. You may have tests, such as:  Endoscopy. This involves using a flexible tube that has a camera on the end of it (endoscope) to look down your throat and examine your esophagus.  Barium swallow. This involves drinking a substance that will show up well on X-rays (barium) and then having X-rays to see how the substance moves through your esophagus.  Esophageal manometry. This involves passing a small, thin tube through your nose and down into your throat. The tube contains pressure sensors that measure muscle contractions in the esophagus while you swallow.  How is this treated?  Mild esophageal spasms may not need treatment. You may be able to manage the spasms by avoiding triggers. For more frequent or severe spasms, treatment may include:  Medicine to:  Relax the esophageal muscles.  Relieve muscle spasms (calcium channel blockers and nitrates).  Relieve pain by blocking nerve endings in the esophagus. This is done with an injection of a toxin (botulinum).  Relieve heartburn (proton pump inhibitors).  Antidepressant medicines. These are sometimes used to ease symptoms.  Surgery to reduce esophageal muscle contractions (myotomy), in very severe cases.  Follow these instructions at home:  Eating and drinking     Keep track of foods, drinks, and habits that trigger spasms or heartburn. Avoid these triggers as much as you can.  Eat meals slowly. Chew food completely before swallowing.  Avoid swallowing foods and drinks when they are very hot or very cold.  General instructions     Take over-the-counter and prescription medicines only as told by your health care provider.  Find ways to manage stress, such as regular exercise or meditation.  If you struggle with depression or anxiety, talk with your health care provider about treatment options.  Keep all follow-up visits as told by your health care provider. This is important.  Contact a health care provider if:  Your symptoms get worse or do not get better with medicine.  You are losing weight because of dysphagia.  Your esophageal spasms affect your quality of life, such as your ability to eat.  Get help right away if:  You have severe chest pain.  You have chest pain that is different from your usual chest pain.  You have trouble breathing.  You choke.  Summary  An esophageal spasm is a sudden tightening (contraction) of the part of the body that moves food from the mouth to the stomach (esophagus). These abnormal muscle contractions can cause chest pain and trouble swallowing (dysphagia).  The cause of esophageal spasms is not known.  Treatment may not be needed for mild spasms. For frequent or more severe spasms, treatment may include medicine, or, for very severe spasms, surgery.  Keep track of foods, drinks, and habits that trigger spasms or heartburn. Avoid these triggers as much as you can.  This information is not intended to replace advice given to you by your health care provider. Make sure you discuss any questions you have with your health care provider.

## 2019-08-06 NOTE — ED PROVIDER NOTE - OBJECTIVE STATEMENT
44F hx GERD, c/o epigastric pain and burning. states  ongoing for a long time, however tonight had sudden onset of pain and felt like spasming up mid chest to throat.  states takes nexium daily and has had endoscopy showing GERD.  pt states also recently had CT but does not have results yet.  states when she had sensation felt like she could not take a deep breath. states she then developed palpitations and tingling to L arm.  no vomiting. no recent travel. no sick contacts.

## 2019-08-06 NOTE — ED ADULT NURSE NOTE - NSIMPLEMENTINTERV_GEN_ALL_ED
Implemented All Universal Safety Interventions:  [place] to call system. Call bell, personal items and telephone within reach. Instruct patient to call for assistance. Room bathroom lighting operational. Non-slip footwear when patient is off stretcher. Physically safe environment: no spills, clutter or unnecessary equipment. Stretcher in lowest position, wheels locked, appropriate side rails in place.

## 2019-08-06 NOTE — ED PROVIDER NOTE - CLINICAL SUMMARY MEDICAL DECISION MAKING FREE TEXT BOX
c/o epigastric pain, spasming into mid chest. then having trouble taking a deep breath with palpitations and tingling to L arm.  no resp distress, no airway compromise currently. no tachycardia, no tachypnea, no hypoxia, PERC neg, doubt PE, doubt ACS  -check labs, ekg  -cxr  -pepcid, ivf

## 2019-08-06 NOTE — ED PROVIDER NOTE - PROGRESS NOTE DETAILS
pt feeling better, no distress, no chest pain.  recommend continued f/u with GI  I have discussed the discharge plan with the patient. The patient agrees with the plan, as discussed.  The patient understands Emergency Department diagnosis is a preliminary diagnosis often based on limited information and that the patient must adhere to the follow-up plan as discussed.  The patient understands that if the symptoms worsen  the patient may return to the Emergency Department at any time for further evaluation and treatment.

## 2019-08-06 NOTE — ED ADULT NURSE NOTE - OBJECTIVE STATEMENT
Patient to the Ed with multiple medical complaints, spasms in esophagus, chest palpitation,  numbness to L arm, SOB, some symp has been going on for over a month

## 2019-08-07 PROCEDURE — 99284 EMERGENCY DEPT VISIT MOD MDM: CPT | Mod: 25

## 2019-08-07 PROCEDURE — 85025 COMPLETE CBC W/AUTO DIFF WBC: CPT

## 2019-08-07 PROCEDURE — 84702 CHORIONIC GONADOTROPIN TEST: CPT

## 2019-08-07 PROCEDURE — 96374 THER/PROPH/DIAG INJ IV PUSH: CPT

## 2019-08-07 PROCEDURE — 83735 ASSAY OF MAGNESIUM: CPT

## 2019-08-07 PROCEDURE — 84484 ASSAY OF TROPONIN QUANT: CPT

## 2019-08-07 PROCEDURE — 36415 COLL VENOUS BLD VENIPUNCTURE: CPT

## 2019-08-07 PROCEDURE — 93005 ELECTROCARDIOGRAM TRACING: CPT

## 2019-08-07 PROCEDURE — 80053 COMPREHEN METABOLIC PANEL: CPT

## 2019-08-07 PROCEDURE — 83690 ASSAY OF LIPASE: CPT

## 2019-08-07 PROCEDURE — 71046 X-RAY EXAM CHEST 2 VIEWS: CPT

## 2019-08-07 RX ADMIN — Medication 30 MILLILITER(S): at 00:09

## 2019-08-11 DIAGNOSIS — R10.13 EPIGASTRIC PAIN: ICD-10-CM

## 2019-08-11 DIAGNOSIS — R06.02 SHORTNESS OF BREATH: ICD-10-CM

## 2019-08-11 DIAGNOSIS — Z79.84 LONG TERM (CURRENT) USE OF ORAL HYPOGLYCEMIC DRUGS: ICD-10-CM

## 2019-08-11 DIAGNOSIS — R20.2 PARESTHESIA OF SKIN: ICD-10-CM

## 2019-08-11 DIAGNOSIS — R07.89 OTHER CHEST PAIN: ICD-10-CM

## 2019-08-11 DIAGNOSIS — Z87.891 PERSONAL HISTORY OF NICOTINE DEPENDENCE: ICD-10-CM

## 2019-08-11 DIAGNOSIS — E11.9 TYPE 2 DIABETES MELLITUS WITHOUT COMPLICATIONS: ICD-10-CM

## 2019-08-11 DIAGNOSIS — Z88.0 ALLERGY STATUS TO PENICILLIN: ICD-10-CM

## 2019-08-11 DIAGNOSIS — R20.0 ANESTHESIA OF SKIN: ICD-10-CM

## 2019-08-28 ENCOUNTER — APPOINTMENT (OUTPATIENT)
Dept: NEUROLOGY | Facility: CLINIC | Age: 44
End: 2019-08-28
Payer: MEDICAID

## 2019-08-28 VITALS
DIASTOLIC BLOOD PRESSURE: 85 MMHG | HEIGHT: 65 IN | OXYGEN SATURATION: 97 % | TEMPERATURE: 99.8 F | HEART RATE: 70 BPM | SYSTOLIC BLOOD PRESSURE: 125 MMHG | BODY MASS INDEX: 42.15 KG/M2 | WEIGHT: 253 LBS

## 2019-08-28 DIAGNOSIS — Z86.69 PERSONAL HISTORY OF OTHER DISEASES OF THE NERVOUS SYSTEM AND SENSE ORGANS: ICD-10-CM

## 2019-08-28 PROCEDURE — 99215 OFFICE O/P EST HI 40 MIN: CPT

## 2019-08-28 RX ORDER — ESOMEPRAZOLE MAGNESIUM 10 MG/1
10 GRANULE, DELAYED RELEASE ORAL
Refills: 0 | Status: ACTIVE | COMMUNITY

## 2019-08-28 NOTE — HISTORY OF PRESENT ILLNESS
[FreeTextEntry1] : 44 year-old RH female presents for evaluation of headaches that have worsened since she was seen last year by Dr. Bose.  \par \par She described her previous headaches as head pressure with squeezing of her heads accompanied by involuntary shaking.  She had workup including MRI Brain without and with ventura and ambulatory EEG that were both negative for acute etiologies.\par \par The involuntary shaking has improved but the headaches have continued.  She describes them now as painful.\par Location: forehead \par Quality: pressure involving her eyes usually.  The pressure feels like everything "lowers" and "as if her head will fly away."  \par - She had more severe pain twice, described as sharp and pressure.  \par \par Aura: eyes build up with pressure, eyes and ears feel clogged, nausea feeling, becomes spaced out.  She then develops drill feeling in back of head with bubbling feeling.  \par - For example, she felt achy upon waking yesterday before developing a headache.\par \par Intensity: worst at 10/10.  Now: 0/10\par Duration: can last for full day\par Frequency: 1-2 times-a-month\par Intervention: sleep, usually doesn't take medications.  Took Tylenol when more severe with relief from 10 to 4/10.\par \par Other workup: She's been cleared by her Ophthalmologist.

## 2019-08-28 NOTE — ASSESSMENT
[FreeTextEntry1] : 44 year-old RH female presents for evaluation of headaches that have worsened since she was seen last year.  They don't fit a specific type of headache pattern, though they do seem to have an associated aura.  Her neurological exam is intact.\par \par Plan:\par 1) Medications - \par a) Acute - Recommended that she take 1-2 tablets of Tylenol at the onset of the pressure in her head.\par - Prescribed Sumatriptan 25mg to be taken when she feels the full aura that portends more severe headache.  Discussed side effects.  Since her palpitations and chest complaints are related to anxiety, not contraindication to triptan.\par b) Prophylaxis - not a candidate at this time since not frequent enough but will reassess over time.\par \par 2) Recommended that she maintain Headache diary including frequency, intensity, duration, interventions to assess if any obvious triggers to her headaches and/or response to the medications.\par \par 3) Continue lifestyle interventions such as sleeping.\par

## 2019-08-28 NOTE — DATA REVIEWED
[de-identified] : Reviewed the actual images of the following with patient - \par CT Head without contrast (Performed 5/7/2019): Unremarkable non-contrast CT of the brain. No change since prior studies. \par MRI Brain without and with ventura (Performed 6/1/2018): \par 1. No acute abnormality. No hydrocephalus, hemorrhage or mass. No evidence of infarction. \par 2. Multifocal punctate signal foci within cerebral white matter.

## 2019-08-28 NOTE — PHYSICAL EXAM
[FreeTextEntry1] : General: sitting on exam table comfortably, NAD\par Neck/Back: no tenderness on palpation of back of neck or along spinal processes\par Eyes: anicteric sclera\par Ears: tympanic membranes are clear bilaterally\par CV: RRR	\par Carotid: No bruits\par Extremities: FROMx4, 2+ radial pulses bilaterally\par 			\par Neurological exam:	\par Mental status: AA&O x 3, fluent - able name, repeat, describe picture fully, spontaneous speech, no dysarthria, follows complex commands across midline, able give full history of present and past events, memory intact, normal attention span, fund of knowledge appropriate\par Cranial nerves: EOMI, PERRL+, VFF, no nystagmus, fundi benign, facial sensation intact, no facial droop, hearing grossly intact to finger snap bilaterally, palatal elevation intact, shoulder shrug intact bilaterally, tongue midline\par Motor: No pronator drift, 5/5 x4, normal tone and bulk\par Sensory: Intact light touch, pinprick, vibration, temperature bilaterally. Negative Romberg\par Reflexes: symmetric\par Cerebellar: FNF and HKS intact bilaterally\par Gait: steady, able tandem, toe and heel walk

## 2019-09-03 ENCOUNTER — APPOINTMENT (OUTPATIENT)
Dept: INTERNAL MEDICINE | Facility: CLINIC | Age: 44
End: 2019-09-03
Payer: MEDICAID

## 2019-09-03 ENCOUNTER — APPOINTMENT (OUTPATIENT)
Age: 44
End: 2019-09-03

## 2019-09-03 VITALS
SYSTOLIC BLOOD PRESSURE: 147 MMHG | WEIGHT: 255 LBS | HEART RATE: 92 BPM | DIASTOLIC BLOOD PRESSURE: 93 MMHG | BODY MASS INDEX: 42.49 KG/M2 | TEMPERATURE: 98.7 F | HEIGHT: 65 IN

## 2019-09-03 DIAGNOSIS — R03.0 ELEVATED BLOOD-PRESSURE READING, W/OUT DIAGNOSIS OF HYPERTENSION: ICD-10-CM

## 2019-09-03 DIAGNOSIS — J06.9 ACUTE UPPER RESPIRATORY INFECTION, UNSPECIFIED: ICD-10-CM

## 2019-09-03 DIAGNOSIS — K30 FUNCTIONAL DYSPEPSIA: ICD-10-CM

## 2019-09-03 DIAGNOSIS — Z72.3 LACK OF PHYSICAL EXERCISE: ICD-10-CM

## 2019-09-03 DIAGNOSIS — Z72.89 OTHER PROBLEMS RELATED TO LIFESTYLE: ICD-10-CM

## 2019-09-03 DIAGNOSIS — Z87.891 PERSONAL HISTORY OF NICOTINE DEPENDENCE: ICD-10-CM

## 2019-09-03 DIAGNOSIS — Z91.89 OTHER SPECIFIED PERSONAL RISK FACTORS, NOT ELSEWHERE CLASSIFIED: ICD-10-CM

## 2019-09-03 PROCEDURE — 99204 OFFICE O/P NEW MOD 45 MIN: CPT

## 2019-09-05 PROBLEM — Z72.3 DOES NOT EXERCISE: Status: ACTIVE | Noted: 2019-09-05

## 2019-09-05 PROBLEM — Z87.891 FORMER SMOKER: Status: ACTIVE | Noted: 2018-02-27

## 2019-09-05 PROBLEM — R03.0 BORDERLINE HYPERTENSION: Status: ACTIVE | Noted: 2018-02-27

## 2019-09-05 PROBLEM — K30: Status: ACTIVE | Noted: 2019-09-03

## 2019-09-05 PROBLEM — Z72.89 ALCOHOL USE: Status: ACTIVE | Noted: 2019-09-05

## 2019-09-05 NOTE — HISTORY OF PRESENT ILLNESS
[FreeTextEntry8] : 44-year-old referred for URI symptoms of the last 5 days.  She was seen in an emergency facility for throat or nose symptoms.  Reportedly, a quick strep test was positive and a prescription was given for cephalexin 500 mg  Currently, the throat symptoms have decreased but she is describing nasal congestion and stuffiness in her ears.  She does have medical records including recent labs but forgot to bring them with her.\par \par Other conditions include obesity.  She has a dietitian but it is unclear how much she is able to follow.\par She has diabetes but displays little understanding, and is in fact chewing sugared gum during her visit today.\par She also describes intestinal sensitivity is but was unable to clearly idenetify what  those sxs (mostly upper) are

## 2019-09-05 NOTE — PHYSICAL EXAM
[Normal Sclera/Conjunctiva] : normal sclera/conjunctiva [Normal Outer Ear/Nose] : the outer ears and nose were normal in appearance [No Respiratory Distress] : no respiratory distress  [Clear to Auscultation] : lungs were clear to auscultation bilaterally [Normal] : normal rate, regular rhythm, normal S1 and S2 and no murmur heard [No Edema] : there was no peripheral edema [Soft] : abdomen soft [Non Tender] : non-tender [Normal Bowel Sounds] : normal bowel sounds [No Skin Lesions] : no skin lesions [No Focal Deficits] : no focal deficits [Speech Grossly Normal] : speech grossly normal [Normal Affect] : the affect was normal [Alert and Oriented x3] : oriented to person, place, and time [de-identified] : obese; no acute distress [de-identified] : canals clear; tms normal drum but perhaps slight pressure. Throat w/o erythema; has tonsils but ot inflammed. [de-identified] : no palpable abnormalities

## 2019-09-05 NOTE — PLAN
[FreeTextEntry1] : as above.\par return with records for more accurate assessment and recommendations.

## 2019-09-05 NOTE — ASSESSMENT
[FreeTextEntry1] : limited insight as to medical conditions.\par No acute illness - just typical "cold"

## 2019-09-06 ENCOUNTER — EMERGENCY (EMERGENCY)
Facility: HOSPITAL | Age: 44
LOS: 1 days | Discharge: HOME | End: 2019-09-06
Attending: EMERGENCY MEDICINE | Admitting: EMERGENCY MEDICINE
Payer: MEDICAID

## 2019-09-06 VITALS
WEIGHT: 253.09 LBS | HEART RATE: 90 BPM | HEIGHT: 63 IN | OXYGEN SATURATION: 94 % | RESPIRATION RATE: 18 BRPM | DIASTOLIC BLOOD PRESSURE: 84 MMHG | TEMPERATURE: 98 F | SYSTOLIC BLOOD PRESSURE: 140 MMHG

## 2019-09-06 VITALS
DIASTOLIC BLOOD PRESSURE: 86 MMHG | RESPIRATION RATE: 18 BRPM | TEMPERATURE: 97 F | HEART RATE: 82 BPM | SYSTOLIC BLOOD PRESSURE: 139 MMHG | OXYGEN SATURATION: 97 %

## 2019-09-06 DIAGNOSIS — Z90.49 ACQUIRED ABSENCE OF OTHER SPECIFIED PARTS OF DIGESTIVE TRACT: Chronic | ICD-10-CM

## 2019-09-06 LAB
ALBUMIN SERPL ELPH-MCNC: 3.7 G/DL — SIGNIFICANT CHANGE UP (ref 3.3–5)
ALP SERPL-CCNC: 75 U/L — SIGNIFICANT CHANGE UP (ref 40–120)
ALT FLD-CCNC: 15 U/L — SIGNIFICANT CHANGE UP (ref 10–45)
ANION GAP SERPL CALC-SCNC: 7 MMOL/L — SIGNIFICANT CHANGE UP (ref 5–17)
AST SERPL-CCNC: 16 U/L — SIGNIFICANT CHANGE UP (ref 10–40)
BASOPHILS # BLD AUTO: 0.02 K/UL — SIGNIFICANT CHANGE UP (ref 0–0.2)
BASOPHILS NFR BLD AUTO: 0.3 % — SIGNIFICANT CHANGE UP (ref 0–2)
BILIRUB SERPL-MCNC: 0.2 MG/DL — SIGNIFICANT CHANGE UP (ref 0.2–1.2)
BUN SERPL-MCNC: 9 MG/DL — SIGNIFICANT CHANGE UP (ref 7–23)
CALCIUM SERPL-MCNC: 9.4 MG/DL — SIGNIFICANT CHANGE UP (ref 8.4–10.5)
CHLORIDE SERPL-SCNC: 100 MMOL/L — SIGNIFICANT CHANGE UP (ref 96–108)
CO2 SERPL-SCNC: 30 MMOL/L — SIGNIFICANT CHANGE UP (ref 22–31)
CREAT SERPL-MCNC: 0.61 MG/DL — SIGNIFICANT CHANGE UP (ref 0.5–1.3)
D DIMER BLD IA.RAPID-MCNC: <150 NG/ML DDU — SIGNIFICANT CHANGE UP
EOSINOPHIL # BLD AUTO: 0.13 K/UL — SIGNIFICANT CHANGE UP (ref 0–0.5)
EOSINOPHIL NFR BLD AUTO: 1.8 % — SIGNIFICANT CHANGE UP (ref 0–6)
GLUCOSE SERPL-MCNC: 262 MG/DL — HIGH (ref 70–99)
HCT VFR BLD CALC: 37.9 % — SIGNIFICANT CHANGE UP (ref 34.5–45)
HGB BLD-MCNC: 12 G/DL — SIGNIFICANT CHANGE UP (ref 11.5–15.5)
IMM GRANULOCYTES NFR BLD AUTO: 0.3 % — SIGNIFICANT CHANGE UP (ref 0–1.5)
LYMPHOCYTES # BLD AUTO: 2.52 K/UL — SIGNIFICANT CHANGE UP (ref 1–3.3)
LYMPHOCYTES # BLD AUTO: 34.2 % — SIGNIFICANT CHANGE UP (ref 13–44)
MCHC RBC-ENTMCNC: 27.8 PG — SIGNIFICANT CHANGE UP (ref 27–34)
MCHC RBC-ENTMCNC: 31.7 GM/DL — LOW (ref 32–36)
MCV RBC AUTO: 87.7 FL — SIGNIFICANT CHANGE UP (ref 80–100)
MONOCYTES # BLD AUTO: 0.57 K/UL — SIGNIFICANT CHANGE UP (ref 0–0.9)
MONOCYTES NFR BLD AUTO: 7.7 % — SIGNIFICANT CHANGE UP (ref 2–14)
NEUTROPHILS # BLD AUTO: 4.11 K/UL — SIGNIFICANT CHANGE UP (ref 1.8–7.4)
NEUTROPHILS NFR BLD AUTO: 55.7 % — SIGNIFICANT CHANGE UP (ref 43–77)
NRBC # BLD: 0 /100 WBCS — SIGNIFICANT CHANGE UP (ref 0–0)
PLATELET # BLD AUTO: 263 K/UL — SIGNIFICANT CHANGE UP (ref 150–400)
POTASSIUM SERPL-MCNC: 4.7 MMOL/L — SIGNIFICANT CHANGE UP (ref 3.5–5.3)
POTASSIUM SERPL-SCNC: 4.7 MMOL/L — SIGNIFICANT CHANGE UP (ref 3.5–5.3)
PROT SERPL-MCNC: 8.1 G/DL — SIGNIFICANT CHANGE UP (ref 6–8.3)
RBC # BLD: 4.32 M/UL — SIGNIFICANT CHANGE UP (ref 3.8–5.2)
RBC # FLD: 13 % — SIGNIFICANT CHANGE UP (ref 10.3–14.5)
SODIUM SERPL-SCNC: 137 MMOL/L — SIGNIFICANT CHANGE UP (ref 135–145)
TROPONIN T SERPL-MCNC: <0.01 NG/ML — SIGNIFICANT CHANGE UP (ref 0–0.01)
WBC # BLD: 7.37 K/UL — SIGNIFICANT CHANGE UP (ref 3.8–10.5)
WBC # FLD AUTO: 7.37 K/UL — SIGNIFICANT CHANGE UP (ref 3.8–10.5)

## 2019-09-06 PROCEDURE — 71046 X-RAY EXAM CHEST 2 VIEWS: CPT | Mod: 26

## 2019-09-06 PROCEDURE — 93005 ELECTROCARDIOGRAM TRACING: CPT

## 2019-09-06 PROCEDURE — 84484 ASSAY OF TROPONIN QUANT: CPT

## 2019-09-06 PROCEDURE — 85025 COMPLETE CBC W/AUTO DIFF WBC: CPT

## 2019-09-06 PROCEDURE — 85379 FIBRIN DEGRADATION QUANT: CPT

## 2019-09-06 PROCEDURE — 99285 EMERGENCY DEPT VISIT HI MDM: CPT

## 2019-09-06 PROCEDURE — 80053 COMPREHEN METABOLIC PANEL: CPT

## 2019-09-06 PROCEDURE — 71046 X-RAY EXAM CHEST 2 VIEWS: CPT

## 2019-09-06 PROCEDURE — 93010 ELECTROCARDIOGRAM REPORT: CPT

## 2019-09-06 PROCEDURE — 99283 EMERGENCY DEPT VISIT LOW MDM: CPT | Mod: 25

## 2019-09-06 PROCEDURE — 36415 COLL VENOUS BLD VENIPUNCTURE: CPT

## 2019-09-06 PROCEDURE — 94640 AIRWAY INHALATION TREATMENT: CPT

## 2019-09-06 RX ORDER — IPRATROPIUM/ALBUTEROL SULFATE 18-103MCG
3 AEROSOL WITH ADAPTER (GRAM) INHALATION ONCE
Refills: 0 | Status: COMPLETED | OUTPATIENT
Start: 2019-09-06 | End: 2019-09-06

## 2019-09-06 RX ORDER — SODIUM CHLORIDE 9 MG/ML
1000 INJECTION INTRAMUSCULAR; INTRAVENOUS; SUBCUTANEOUS ONCE
Refills: 0 | Status: COMPLETED | OUTPATIENT
Start: 2019-09-06 | End: 2019-09-06

## 2019-09-06 RX ORDER — ALBUTEROL 90 UG/1
2.5 AEROSOL, METERED ORAL ONCE
Refills: 0 | Status: COMPLETED | OUTPATIENT
Start: 2019-09-06 | End: 2019-09-06

## 2019-09-06 RX ADMIN — ALBUTEROL 2.5 MILLIGRAM(S): 90 AEROSOL, METERED ORAL at 20:30

## 2019-09-06 NOTE — ED PROVIDER NOTE - CLINICAL SUMMARY MEDICAL DECISION MAKING FREE TEXT BOX
fever, cough, sore throat, sob.   a febrile. VSS. pt well appearing. lungs clear on exam. neb given in ED. cxr no acute pathology. ecg non ischemic. labs noted. troponin and d dimer negative. suspect viral etiology. cont antibiotic and home inhaler. rest, increase fluids, motrin and f/u with pmd.

## 2019-09-06 NOTE — ED PROVIDER NOTE - OBJECTIVE STATEMENT
43 y/o female with hx of DM, HTN, recent asthma dx c/o fever, cough, sob, and sore throat. pt states sx's began 1 wk ago and seen at urgent care. pt notes tx with keflex for strep throat. pt states no improvement in sx's with antibiotic. pt notes SOB past 2 days with cp. Pt states sharp tearing pain to chest this am but now just discomfort. no abd pain, n/v. no sick contact. no recent travel. no further complaints.

## 2019-09-06 NOTE — ED PROVIDER NOTE - PROGRESS NOTE DETAILS
d/w pt elevated glucose and need to take DM medications and monitor glucose level and d/w diet. f/u with pmd

## 2019-09-06 NOTE — ED PROVIDER NOTE - PATIENT PORTAL LINK FT
You can access the FollowMyHealth Patient Portal offered by [facility] by registering at the following website: [email or web address]. By joining [employer]’s FollowMyHealth portal, you will also be able to view your health information using other applications (apps) compatible with our system.

## 2019-09-06 NOTE — ED PROVIDER NOTE - ATTENDING CONTRIBUTION TO CARE
44F hx DM HTN recently diagnosed asthma, here w/ sore throat, subj fevers, cough, sob x 1 weekish. placed on keflex for unclear reason by urgent care, not better with it. sob worse today w/ associated chest pain today which is why she came in the ED. no n/v, no recent travel. vss, lungs clear on exam. will trial albuterol to see if helps symptoms. will also check EKG, CXR.

## 2019-09-06 NOTE — ED ADULT NURSE NOTE - CHPI ED NUR SYMPTOMS NEG
no chest pain/no hemoptysis/no wheezing/no diaphoresis/no headache/no body aches/no edema/no fever/no chills

## 2019-09-06 NOTE — ED ADULT TRIAGE NOTE - BMI (KG/M2)
40.8 Initiate Treatment: Apply CeraVe cream then\\ntriamcinolone acetonide 0.1 % topical cream \\nApply sparingly to itch rash on twice daily for up to 2 weeks Otc Regimen: CeraVe cream twice a day Detail Level: Simple

## 2019-09-06 NOTE — ED ADULT NURSE NOTE - NSIMPLEMENTINTERV_GEN_ALL_ED
Implemented All Universal Safety Interventions:  [place] to call system. Call bell, personal items and telephone within reach. Instruct patient to call for assistance. Room bathroom lighting operational. Non-slip footwear when patient is off stretcher. Physically safe environment: no spills, clutter or unnecessary equipment. Stretcher in lowest position, wheels locked, appropriate side rails in place.

## 2019-09-06 NOTE — ED PROVIDER NOTE - NSFOLLOWUPINSTRUCTIONS_ED_ALL_ED_FT
Follow up with your physician this week.         Upper Respiratory Infection, Adult  An upper respiratory infection (URI) is a common viral infection of the nose, throat, and upper air passages that lead to the lungs. The most common type of URI is the common cold. URIs usually get better on their own, without medical treatment.    What are the causes?  A URI is caused by a virus. You may catch a virus by:  Breathing in droplets from an infected person's cough or sneeze.  Touching something that has been exposed to the virus (contaminated) and then touching your mouth, nose, or eyes.  What increases the risk?  You are more likely to get a URI if:  You are very young or very old.  It is [place] or winter.  You have close contact with others, such as at a , school, or health care facility.  You smoke.  You have long-term (chronic) heart or lung disease.  You have a weakened disease-fighting (immune) system.  You have nasal allergies or asthma.  You are experiencing a lot of stress.  You work in an area that has poor air circulation.  You have poor nutrition.  What are the signs or symptoms?  A URI usually involves some of the following symptoms:  Runny or stuffy (congested) nose.  Sneezing.  Cough.  Sore throat.  Headache.  Fatigue.  Fever.  Loss of appetite.  Pain in your forehead, behind your eyes, and over your cheekbones (sinus pain).  Muscle aches.  Redness or irritation of the eyes.  Pressure in the ears or face.  How is this diagnosed?  This condition may be diagnosed based on your medical history and symptoms, and a physical exam. Your health care provider may use a cotton swab to take a mucus sample from your nose (nasal swab). This sample can be tested to determine what virus is causing the illness.    How is this treated?  URIs usually get better on their own within 7–10 days. You can take steps at home to relieve your symptoms. Medicines cannot cure URIs, but your health care provider may recommend certain medicines to help relieve symptoms, such as:  Over-the-counter cold medicines.  Cough suppressants. Coughing is a type of defense against infection that helps to clear the respiratory system, so take these medicines only as recommended by your health care provider.  Fever-reducing medicines.  Follow these instructions at home:  Activity     Rest as needed.  If you have a fever, stay home from work or school until your fever is gone or until your health care provider says you are no longer contagious. Your health care provider may have you wear a face mask to prevent your infection from spreading.  Relieving symptoms     Gargle with a salt-water mixture 3–4 times a day or as needed. To make a salt-water mixture, completely dissolve ½–1 tsp of salt in 1 cup of warm water.  Use a cool-mist humidifier to add moisture to the air. This can help you breathe more easily.  Eating and drinking     Image   Drink enough fluid to keep your urine pale yellow.  Eat soups and other clear broths.  General instructions     Image   Take over-the-counter and prescription medicines only as told by your health care provider. These include cold medicines, fever reducers, and cough suppressants.  Do not use any products that contain nicotine or tobacco, such as cigarettes and e-cigarettes. If you need help quitting, ask your health care provider.   Stay away from secondhand smoke.  Stay up to date on all immunizations, including the yearly (annual) flu vaccine.  Keep all follow-up visits as told by your health care provider. This is important.  How to prevent the spread of infection to others     Image   URIs can be passed from person to person (are contagious). To prevent the infection from spreading:  Wash your hands often with soap and water. If soap and water are not available, use hand .  Avoid touching your mouth, face, eyes, or nose.  Cough or sneeze into a tissue or your sleeve or elbow instead of into your hand or into the air.  Contact a health care provider if:  You are getting worse instead of better.  You have a fever or chills.  Your mucus is brown or red.  You have yellow or brown discharge coming from your nose.  You have pain in your face, especially when you bend forward.  You have swollen neck glands.  You have pain while swallowing.  You have white areas in the back of your throat.  Get help right away if:  You have shortness of breath that gets worse.  You have severe or persistent:  Headache.  Ear pain.  Sinus pain.  Chest pain.  You have chronic lung disease along with any of the following:  Wheezing.  Prolonged cough.  Coughing up blood.  A change in your usual mucus.  You have a stiff neck.  You have changes in your:  Vision.  Hearing.  Thinking.  Mood.  Summary  An upper respiratory infection (URI) is a common infection of the nose, throat, and upper air passages that lead to the lungs.  A URI is caused by a virus.  URIs usually get better on their own within 7–10 days.  Medicines cannot cure URIs, but your health care provider may recommend certain medicines to help relieve symptoms.  This information is not intended to replace advice given to you by your health care provider. Make sure you discuss any questions you have with your health care provider.    Document Released: 06/13/2002 Document Revised: 08/03/2018 Document Reviewed: 08/03/2018  [employer] Interactive Patient Education © 2019 [employer]

## 2019-09-06 NOTE — ED PROVIDER NOTE - CARE PLAN
Principal Discharge DX:	Cough  Secondary Diagnosis:	SOB (shortness of breath) Principal Discharge DX:	Cough  Secondary Diagnosis:	SOB (shortness of breath)  Secondary Diagnosis:	Hyperglycemia

## 2019-09-06 NOTE — ED ADULT NURSE NOTE - OBJECTIVE STATEMENT
Patient states had sore throat and red rash on arm, w/ ear pain,  went to MD and  diagnosed w/ Strep throat last week, antibiotics prescribed,  c/o of SOB and sharp pain on neck, chest and back, symptoms started 2 days ago.  EKG in progress.  Also c/o of " wet cough on chest", no fevers.  No difficulty speaking in long sentences.  Lung sounds clear bilaterally.

## 2019-09-06 NOTE — ED ADULT TRIAGE NOTE - CHIEF COMPLAINT QUOTE
Patient states had sore throat and red rash on arm, w/ ear pain,  went to MD and  diagnosed w/ Strep throat last week, antibiotics prescribed,  c/o of SOB and sharp pain on neck, chest and back, symptoms started 2 days ago.  EKG in progress.  Also c/o of " wet cough on chest", no fevers.  No difficulty speaking in long sentences.

## 2019-09-11 DIAGNOSIS — R06.02 SHORTNESS OF BREATH: ICD-10-CM

## 2019-09-11 DIAGNOSIS — R05 COUGH: ICD-10-CM

## 2019-09-11 DIAGNOSIS — R07.9 CHEST PAIN, UNSPECIFIED: ICD-10-CM

## 2019-09-11 DIAGNOSIS — E11.65 TYPE 2 DIABETES MELLITUS WITH HYPERGLYCEMIA: ICD-10-CM

## 2019-10-22 ENCOUNTER — EMERGENCY (EMERGENCY)
Facility: HOSPITAL | Age: 44
LOS: 1 days | Discharge: HOME | End: 2019-10-22
Attending: EMERGENCY MEDICINE | Admitting: EMERGENCY MEDICINE
Payer: MEDICAID

## 2019-10-22 VITALS
DIASTOLIC BLOOD PRESSURE: 88 MMHG | TEMPERATURE: 98 F | HEIGHT: 65 IN | HEART RATE: 77 BPM | WEIGHT: 244.93 LBS | SYSTOLIC BLOOD PRESSURE: 135 MMHG | RESPIRATION RATE: 18 BRPM | OXYGEN SATURATION: 99 %

## 2019-10-22 VITALS
TEMPERATURE: 98 F | SYSTOLIC BLOOD PRESSURE: 144 MMHG | OXYGEN SATURATION: 98 % | RESPIRATION RATE: 16 BRPM | HEART RATE: 79 BPM | DIASTOLIC BLOOD PRESSURE: 91 MMHG

## 2019-10-22 DIAGNOSIS — Z90.49 ACQUIRED ABSENCE OF OTHER SPECIFIED PARTS OF DIGESTIVE TRACT: Chronic | ICD-10-CM

## 2019-10-22 LAB — HCG SERPL-ACNC: <0 MIU/ML — SIGNIFICANT CHANGE UP

## 2019-10-22 PROCEDURE — 99284 EMERGENCY DEPT VISIT MOD MDM: CPT

## 2019-10-22 PROCEDURE — 80053 COMPREHEN METABOLIC PANEL: CPT

## 2019-10-22 PROCEDURE — 93010 ELECTROCARDIOGRAM REPORT: CPT

## 2019-10-22 PROCEDURE — 99285 EMERGENCY DEPT VISIT HI MDM: CPT | Mod: 25

## 2019-10-22 PROCEDURE — 96374 THER/PROPH/DIAG INJ IV PUSH: CPT

## 2019-10-22 PROCEDURE — 36415 COLL VENOUS BLD VENIPUNCTURE: CPT

## 2019-10-22 PROCEDURE — 93005 ELECTROCARDIOGRAM TRACING: CPT

## 2019-10-22 PROCEDURE — 85025 COMPLETE CBC W/AUTO DIFF WBC: CPT

## 2019-10-22 PROCEDURE — 82962 GLUCOSE BLOOD TEST: CPT

## 2019-10-22 PROCEDURE — 84702 CHORIONIC GONADOTROPIN TEST: CPT

## 2019-10-22 RX ORDER — SODIUM CHLORIDE 9 MG/ML
1000 INJECTION INTRAMUSCULAR; INTRAVENOUS; SUBCUTANEOUS ONCE
Refills: 0 | Status: COMPLETED | OUTPATIENT
Start: 2019-10-22 | End: 2019-10-22

## 2019-10-22 RX ORDER — ACETAMINOPHEN 500 MG
650 TABLET ORAL ONCE
Refills: 0 | Status: COMPLETED | OUTPATIENT
Start: 2019-10-22 | End: 2019-10-22

## 2019-10-22 RX ORDER — MAGNESIUM SULFATE 500 MG/ML
1 VIAL (ML) INJECTION ONCE
Refills: 0 | Status: COMPLETED | OUTPATIENT
Start: 2019-10-22 | End: 2019-10-22

## 2019-10-22 RX ADMIN — Medication 100 GRAM(S): at 14:21

## 2019-10-22 RX ADMIN — SODIUM CHLORIDE 1000 MILLILITER(S): 9 INJECTION INTRAMUSCULAR; INTRAVENOUS; SUBCUTANEOUS at 14:08

## 2019-10-22 RX ADMIN — Medication 650 MILLIGRAM(S): at 14:21

## 2019-10-22 NOTE — ED PROVIDER NOTE - PATIENT PORTAL LINK FT
You can access the FollowMyHealth Patient Portal offered by [facility] by registering at the following website: [email or web address]. By joining [employer]’s FollowMyHealth portal, you will also be able to view your health information using other applications (apps) compatible with our system.

## 2019-10-22 NOTE — ED PROVIDER NOTE - PMH
H. pylori infection    Migraines    Type 2 diabetes mellitus without complication, without long-term current use of insulin

## 2019-10-22 NOTE — ED PROVIDER NOTE - NSFOLLOWUPINSTRUCTIONS_ED_ALL_ED_FT
Take over the counter pain medications and drink plenty of water.  Try to reduce stress if possible and avoid alcohol.  Follow up in [facility]- see number/location below.  Return to ED for fever, vomiting, seizure activity, difficulty walking, weakness, numbness    Headache    A headache is pain or discomfort felt around the head or neck area. The specific cause of a headache may not be found as there are many types including tension headaches, migraine headaches, and cluster headaches. Watch your condition for any changes. Things you can do to manage your pain include taking over the counter and prescription medications as instructed by your health care provider, lying down in a dark quiet room, limiting stress, getting regular sleep, and refraining from alcohol and tobacco products.    SEEK IMMEDIATE MEDICAL CARE IF YOU HAVE ANY OF THE FOLLOWING SYMPTOMS: fever, vomiting, stiff neck, loss of vision, problems with speech, muscle weakness, loss of balance, trouble walking, passing out, or confusion.

## 2019-10-22 NOTE — ED ADULT NURSE NOTE - OBJECTIVE STATEMENT
44 year old F patient, A+OX3, ambulatory w steady gait presents to ED with c/o of headache since saturday accompanied by intermittent dizziness.  NO distress noted, denies visual changes.  Breathing easily and unlabored, denies chest pain, marley sob.  Denies trauma or falls.

## 2019-10-22 NOTE — ED PROVIDER NOTE - NSFOLLOWUPCLINICS_GEN_ALL_ED_FT
[facility]  Family Medicine  [address] [place] [address]  Phone: [phone]  Fax:   Follow Up Time:     [address]  [address] [place] [address]  Phone: [phone]  Fax: [phone]  Follow Up Time:

## 2019-10-22 NOTE — ED ADULT TRIAGE NOTE - NSWEIGHTCALCTOOLDRUG_GEN_A_CORE
Called to bedside because RN found RIJ Dialysis catheter almost all of the way out. Line cleansed with chlorhexidine and able to advance back into place. Sutured in place. Line functional and CXR okay. Will give vanco IV x 1 due to concern for line contamination. Would remove catheter at earliest opportunity.    Clau Scott MD  Critical Care Medicine      used

## 2019-10-22 NOTE — ED PROVIDER NOTE - CLINICAL SUMMARY MEDICAL DECISION MAKING FREE TEXT BOX
44 F pmh migraines, vertigo, gerd, dm p/w headache x 4 days. consistent with prior migraines but lasting longer than normal.  likely migraine w/ tension headache.  pt nontoxic appearing, no concerning symptoms/neuro deficits.  labs unremarkable, hcg negative.  pt offered migraine cocktail, only accepted magnesium, also given IVF and tylenol with improvement.  pt educated on OTC analgesia, supportive measures and d/c with referral for PMD as pt doesn't have one.  pt ambulating with steady gait, stable for d/c.  discussed strict return parameters. 44 F pmh migraines, vertigo, gerd, dm p/w headache x 4 days. consistent with prior migraines but lasting longer than normal.  likely migraine w/ tension headache.  pt nontoxic appearing, no concerning symptoms/neuro deficits.  labs unremarkable, hcg negative.  EKG nsr, no arrhythmia.  pt offered migraine cocktail, only accepted magnesium, also given IVF and tylenol with improvement.  pt educated on OTC analgesia, supportive measures and d/c with referral for PMD as pt doesn't have one.  pt ambulating with steady gait, stable for d/c.  discussed strict return parameters.

## 2019-10-22 NOTE — ED PROVIDER NOTE - OBJECTIVE STATEMENT
44 F pmh migraines, vertigo, gerd, dm p/w headache x 4 days.  pt reports headache started Saturday upon waking up, described as progressively worsening dull/throbbing frontal/bitemporal w/ associated tinnitus.  reports symptoms consistent with prior headaches and vertigo but they typically resolved spontaneously.  Pt now reports headache is dull/throbbing occipital HA, has not taken anything for pain.  Endorses increased stress over past week.  Admits she needs glasses and has poor outpt follow up, had pmd but doesn't go and was referred to [facility] but has not yet made appointment.  Denies fever, chills, vision changes, neck pain/stiffness, fainting, chest pain, sob, abd pain, nvd, travel, weakness, numbness, trauma 44 F pmh migraines, vertigo, gerd, dm p/w headache x 4 days.  pt reports headache started Saturday upon waking up, described as progressively worsening dull/throbbing frontal/bitemporal w/ associated tinnitus.  reports symptoms consistent with prior headaches and vertigo but they typically resolved spontaneously.  Pt now reports headache is dull/throbbing occipital HA, has not taken anything for pain.  Endorses increased stress over past week.  Admits she needs glasses and has poor outpt follow up, had pmd but doesn't go and was referred to [facility] but has not yet made appointment.  Denies fever, chills, vision changes, neck pain/stiffness, fainting, chest pain, sob, abd pain, nvd, travel, weakness, numbness, trauma, seizure activity 44 F pmh migraines, vertigo, gerd, dm p/w headache x 4 days.  pt reports headache started Saturday upon waking up, described as progressively worsening dull/throbbing frontal/bitemporal w/ associated tinnitus.  reports symptoms consistent with prior headaches and vertigo but they typically resolved spontaneously.  Pt now reports headache is dull/throbbing occipital HA, has not taken anything for pain.  Endorses increased stress over past week.  Admits she needs glasses and has poor outpt follow up, had pmd long time ago and was referred to [facility] but has not yet made appointment.  Denies fever, chills, vision changes, neck pain/stiffness, fainting, chest pain, sob, abd pain, nvd, travel, weakness, numbness, trauma, seizure activity

## 2019-10-22 NOTE — ED PROVIDER NOTE - PHYSICAL EXAMINATION
Vitals reviewed  Gen: sitting comfortably in chair, nontoxic appearing, nad, speaking in full sentences  Skin: wwp   HEENT: ncat, eomi, perrla, no nystagmus, mmm  Neck: supple, no meningeal signs   CV: rrr, no audible m/r/g  Resp: ctab, no w/r/r  Abd: soft/nt  Ext: FROM throughout, no peripheral edema  Neuro: alert/oriented, no focal deficits, steady gait, normal finger to nose

## 2019-10-22 NOTE — ED PROVIDER NOTE - ATTENDING CONTRIBUTION TO CARE
44F pmh migraines, DM, p/w headache since saturday, frontal, bitemporal, migrating, now occipital, assoc w/ increased stress at home and some fatigue. had hx of vertigo & ringing in ears at home. No meds at home, has not had f/u with PMD. No f/c, no n/v, no head trauma or fall. EKG pending. Pt declined migraine cocktail except for magnesium. Nontoxic appearing, no focal deficits. Unlikely stroke, arrythmia, sah given clinical hx & description. EKG pending due to add'l complaint lightheaded at triage.

## 2019-10-22 NOTE — ED ADULT NURSE NOTE - CHPI ED NUR SYMPTOMS NEG
no tingling/no vomiting/no nausea/no weakness/no chills/no dizziness/no fever/no decreased eating/drinking

## 2019-10-26 DIAGNOSIS — R51 HEADACHE: ICD-10-CM

## 2019-10-29 ENCOUNTER — EMERGENCY (EMERGENCY)
Facility: HOSPITAL | Age: 44
LOS: 1 days | Discharge: HOME | End: 2019-10-29
Attending: EMERGENCY MEDICINE | Admitting: EMERGENCY MEDICINE
Payer: MEDICAID

## 2019-10-29 VITALS
RESPIRATION RATE: 17 BRPM | HEART RATE: 84 BPM | SYSTOLIC BLOOD PRESSURE: 186 MMHG | TEMPERATURE: 98 F | WEIGHT: 250 LBS | OXYGEN SATURATION: 99 % | DIASTOLIC BLOOD PRESSURE: 111 MMHG

## 2019-10-29 VITALS
HEART RATE: 80 BPM | RESPIRATION RATE: 16 BRPM | SYSTOLIC BLOOD PRESSURE: 156 MMHG | DIASTOLIC BLOOD PRESSURE: 89 MMHG | OXYGEN SATURATION: 99 %

## 2019-10-29 DIAGNOSIS — Z90.49 ACQUIRED ABSENCE OF OTHER SPECIFIED PARTS OF DIGESTIVE TRACT: Chronic | ICD-10-CM

## 2019-10-29 PROBLEM — G43.909 MIGRAINE, UNSPECIFIED, NOT INTRACTABLE, WITHOUT STATUS MIGRAINOSUS: Chronic | Status: ACTIVE | Noted: 2019-10-22

## 2019-10-29 LAB
ALBUMIN SERPL ELPH-MCNC: 4.1 G/DL — SIGNIFICANT CHANGE UP (ref 3.3–5)
ALP SERPL-CCNC: 71 U/L — SIGNIFICANT CHANGE UP (ref 40–120)
ALT FLD-CCNC: 14 U/L — SIGNIFICANT CHANGE UP (ref 10–45)
ANION GAP SERPL CALC-SCNC: 11 MMOL/L — SIGNIFICANT CHANGE UP (ref 5–17)
APPEARANCE UR: CLEAR — SIGNIFICANT CHANGE UP
AST SERPL-CCNC: 16 U/L — SIGNIFICANT CHANGE UP (ref 10–40)
BILIRUB SERPL-MCNC: 0.2 MG/DL — SIGNIFICANT CHANGE UP (ref 0.2–1.2)
BILIRUB UR-MCNC: NEGATIVE — SIGNIFICANT CHANGE UP
BUN SERPL-MCNC: 6 MG/DL — LOW (ref 7–23)
CALCIUM SERPL-MCNC: 9 MG/DL — SIGNIFICANT CHANGE UP (ref 8.4–10.5)
CHLORIDE SERPL-SCNC: 98 MMOL/L — SIGNIFICANT CHANGE UP (ref 96–108)
CK MB CFR SERPL CALC: <1 NG/ML — SIGNIFICANT CHANGE UP (ref 0–6.7)
CO2 SERPL-SCNC: 28 MMOL/L — SIGNIFICANT CHANGE UP (ref 22–31)
COLOR SPEC: YELLOW — SIGNIFICANT CHANGE UP
CREAT SERPL-MCNC: 0.63 MG/DL — SIGNIFICANT CHANGE UP (ref 0.5–1.3)
DIFF PNL FLD: NEGATIVE — SIGNIFICANT CHANGE UP
GLUCOSE SERPL-MCNC: 247 MG/DL — HIGH (ref 70–99)
GLUCOSE UR QL: 500
HCG SERPL-ACNC: <0 MIU/ML — SIGNIFICANT CHANGE UP
KETONES UR-MCNC: NEGATIVE — SIGNIFICANT CHANGE UP
LEUKOCYTE ESTERASE UR-ACNC: NEGATIVE — SIGNIFICANT CHANGE UP
LIDOCAIN IGE QN: 25 U/L — SIGNIFICANT CHANGE UP (ref 7–60)
NITRITE UR-MCNC: NEGATIVE — SIGNIFICANT CHANGE UP
PH UR: 6.5 — SIGNIFICANT CHANGE UP (ref 5–8)
POTASSIUM SERPL-MCNC: 4.1 MMOL/L — SIGNIFICANT CHANGE UP (ref 3.5–5.3)
POTASSIUM SERPL-SCNC: 4.1 MMOL/L — SIGNIFICANT CHANGE UP (ref 3.5–5.3)
PROT SERPL-MCNC: 7.5 G/DL — SIGNIFICANT CHANGE UP (ref 6–8.3)
PROT UR-MCNC: NEGATIVE MG/DL — SIGNIFICANT CHANGE UP
SODIUM SERPL-SCNC: 137 MMOL/L — SIGNIFICANT CHANGE UP (ref 135–145)
SP GR SPEC: <=1.005 — SIGNIFICANT CHANGE UP (ref 1–1.03)
TROPONIN T SERPL-MCNC: <0.01 NG/ML — SIGNIFICANT CHANGE UP (ref 0–0.01)
UROBILINOGEN FLD QL: 0.2 E.U./DL — SIGNIFICANT CHANGE UP

## 2019-10-29 PROCEDURE — 93005 ELECTROCARDIOGRAM TRACING: CPT

## 2019-10-29 PROCEDURE — 36415 COLL VENOUS BLD VENIPUNCTURE: CPT

## 2019-10-29 PROCEDURE — 83690 ASSAY OF LIPASE: CPT

## 2019-10-29 PROCEDURE — 84702 CHORIONIC GONADOTROPIN TEST: CPT

## 2019-10-29 PROCEDURE — 99285 EMERGENCY DEPT VISIT HI MDM: CPT | Mod: 25

## 2019-10-29 PROCEDURE — 99283 EMERGENCY DEPT VISIT LOW MDM: CPT | Mod: 25

## 2019-10-29 PROCEDURE — 80053 COMPREHEN METABOLIC PANEL: CPT

## 2019-10-29 PROCEDURE — 71046 X-RAY EXAM CHEST 2 VIEWS: CPT | Mod: 26

## 2019-10-29 PROCEDURE — 93010 ELECTROCARDIOGRAM REPORT: CPT

## 2019-10-29 PROCEDURE — 84484 ASSAY OF TROPONIN QUANT: CPT

## 2019-10-29 PROCEDURE — 82550 ASSAY OF CK (CPK): CPT

## 2019-10-29 PROCEDURE — 71046 X-RAY EXAM CHEST 2 VIEWS: CPT

## 2019-10-29 PROCEDURE — 81003 URINALYSIS AUTO W/O SCOPE: CPT

## 2019-10-29 PROCEDURE — 82553 CREATINE MB FRACTION: CPT

## 2019-10-29 RX ORDER — SODIUM CHLORIDE 9 MG/ML
1000 INJECTION INTRAMUSCULAR; INTRAVENOUS; SUBCUTANEOUS ONCE
Refills: 0 | Status: COMPLETED | OUTPATIENT
Start: 2019-10-29 | End: 2019-10-29

## 2019-10-29 RX ORDER — METOCLOPRAMIDE HCL 10 MG
10 TABLET ORAL ONCE
Refills: 0 | Status: COMPLETED | OUTPATIENT
Start: 2019-10-29 | End: 2019-10-29

## 2019-10-29 RX ADMIN — SODIUM CHLORIDE 1000 MILLILITER(S): 9 INJECTION INTRAMUSCULAR; INTRAVENOUS; SUBCUTANEOUS at 10:33

## 2019-10-29 RX ADMIN — Medication 30 MILLILITER(S): at 10:32

## 2019-10-29 NOTE — ED PROVIDER NOTE - NSFOLLOWUPINSTRUCTIONS_ED_ALL_ED_FT
Please continue your home medications as prescribed, drink plenty of water and follow up with your doctor in one week.  Return to ED for any fever, chest pain, difficulty breathing, vomiting, fainting    Gastritis    Gastritis is soreness and swelling (inflammation) of the lining of the stomach. Gastritis can develop as a sudden onset (acute) or long-term (chronic) condition. If gastritis is not treated, it can lead to stomach bleeding and ulcers. Causes include viral and bacterial infections, excessive alcohol consumption, tobacco use, or certain medications. Symptoms include nausea, vomiting, or abdominal pain or burning especially after eating. Avoid foods or drinks that make your symptoms worse such as caffeine, chocolate, spicy foods, acidic foods, or alcohol.    SEEK IMMEDIATE MEDICAL CARE IF YOU HAVE ANY OF THE FOLLOWING SYMPTOMS: black or bloody stools, blood or coffee-ground-colored vomitus, worsening abdominal pain, fever, or inability to keep fluids down.    Chest Pain    Chest pain can be caused by many different conditions which may or may not be dangerous. Causes include heartburn, lung infections, heart attack, blood clot in lungs, skin infections, strain or damage to muscle, cartilage, or bones, etc. In addition to a history and physical examination, an electrocardiogram (ECG) or other lab tests may have been performed to determine the cause of your chest pain. Follow up with your primary care provider or with a cardiologist as instructed.     SEEK IMMEDIATE MEDICAL CARE IF YOU HAVE ANY OF THE FOLLOWING SYMPTOMS: worsening chest pain, coughing up blood, unexplained back/neck/jaw pain, severe abdominal pain, dizziness or lightheadedness, fainting, shortness of breath, sweaty or clammy skin, vomiting, or racing heart beat. These symptoms may represent a serious problem that is an emergency. Do not wait to see if the symptoms will go away. Get medical help right away. Call 911 and do not drive yourself to the hospital.

## 2019-10-29 NOTE — ED PROVIDER NOTE - DIAGNOSTIC INTERPRETATION
ER PA: Mireya Gonzalez  CHEST XRAY INTERPRETATION: lungs clear, heart shadow normal, bony structures intact

## 2019-10-29 NOTE — ED PROVIDER NOTE - OBJECTIVE STATEMENT
44 F pmh dm, GERD poor historian p/w chest/abd discomfort and dizziness x one week.  Pt reports intermittent epigastric bubbling w/ radiation into R chest and associated sob, resolves after a few minutes.  Also c/o dizziness as feeling lightheaded, reports checking her sugar when she has symptoms and its typically well controlled 100-200s.  Denies fever, chills, headache, vision changes, palpitations, nvd, sick contacts, travel, leg pain/swelling, trauma.  Pt reports dark colored urine/frequent urination in triage which she now denies and denies lower abd pain only epigastric.

## 2019-10-29 NOTE — ED PROVIDER NOTE - ATTENDING CONTRIBUTION TO CARE
intermittent chest discomfort, shortness of breath, lightheadedness for past week. also with abdominal discomforts, urinary symptoms.  no fever, no trauma.  denies alcohol/ drug use.  exam non focal, abdomen soft non tender, lungs clear, heart regular.  CXR: no focal consolidation, normal bones, normal cardiac contour.  read by Dr. Small.  labs with normal troponin, normal renal function.

## 2019-10-29 NOTE — ED ADULT NURSE NOTE - NSIMPLEMENTINTERV_GEN_ALL_ED
Implemented All Universal Safety Interventions:  [place] to call system. Call bell, personal items and telephone within reach. Instruct patient to call for assistance. Room bathroom lighting operational. Non-slip footwear when patient is off stretcher. Physically safe environment: no spills, clutter or unnecessary equipment. Stretcher in lowest position, wheels locked, appropriate side rails in place.

## 2019-10-29 NOTE — ED PROVIDER NOTE - PHYSICAL EXAMINATION
Vitals reviewed  Gen: well appearing, nad, speaking in full sentences  Skin: wwp   HEENT: ncat, eomi, mmm  CV: rrr, no audible m/r/g  Resp: symmetrical expansion, ctab, no w/r/r  Abd: soft/nt, no cvat  Ext: FROM throughout, no peripheral edema/calf ttp   Neuro: alert/oriented, no focal deficits, steady gait

## 2019-10-29 NOTE — ED PROVIDER NOTE - PATIENT PORTAL LINK FT
You can access the FollowMyHealth Patient Portal offered by [facility] by registering at the following website: [email or web address]. By joining [employer]’s FollowMyHealth portal, you will also be able to view your health information using other applications (apps) compatible with our system.

## 2019-10-29 NOTE — ED ADULT NURSE NOTE - OBJECTIVE STATEMENT
43 y/o female c/o epigastric pain. reports history of gerd. initially c/o urinary symptoms but now denies any urinary symptoms. EKG in progress.

## 2019-10-29 NOTE — ED PROVIDER NOTE - CLINICAL SUMMARY MEDICAL DECISION MAKING FREE TEXT BOX
44 F pmh dm, GERD poor historian p/w chest/abd discomfort and dizziness x one week.  labs unremarkable, negative troponin.  Clear CXR, unchanged from prior.  pt w/ symptomatic improvement with IVF, reglan and maalox.  instructed to continue her nexium and metformin as prescribed.  She will follow up with her PMD in one week.  discussed strict return parameters.  d/w attending

## 2019-11-02 DIAGNOSIS — R07.89 OTHER CHEST PAIN: ICD-10-CM

## 2019-11-02 DIAGNOSIS — Z79.4 LONG TERM (CURRENT) USE OF INSULIN: ICD-10-CM

## 2019-11-02 DIAGNOSIS — R10.13 EPIGASTRIC PAIN: ICD-10-CM

## 2019-11-02 DIAGNOSIS — R10.9 UNSPECIFIED ABDOMINAL PAIN: ICD-10-CM

## 2019-11-02 DIAGNOSIS — E11.9 TYPE 2 DIABETES MELLITUS WITHOUT COMPLICATIONS: ICD-10-CM

## 2019-11-08 ENCOUNTER — APPOINTMENT (OUTPATIENT)
Dept: INTERNAL MEDICINE | Facility: CLINIC | Age: 44
End: 2019-11-08

## 2020-01-10 ENCOUNTER — EMERGENCY (EMERGENCY)
Facility: HOSPITAL | Age: 45
LOS: 1 days | Discharge: HOME | End: 2020-01-10
Attending: EMERGENCY MEDICINE | Admitting: EMERGENCY MEDICINE
Payer: MEDICAID

## 2020-01-10 VITALS
DIASTOLIC BLOOD PRESSURE: 83 MMHG | RESPIRATION RATE: 20 BRPM | OXYGEN SATURATION: 97 % | TEMPERATURE: 97 F | SYSTOLIC BLOOD PRESSURE: 135 MMHG | HEIGHT: 65 IN | WEIGHT: 250 LBS | HEART RATE: 83 BPM

## 2020-01-10 VITALS
DIASTOLIC BLOOD PRESSURE: 82 MMHG | SYSTOLIC BLOOD PRESSURE: 141 MMHG | OXYGEN SATURATION: 98 % | RESPIRATION RATE: 18 BRPM | HEART RATE: 82 BPM | TEMPERATURE: 99 F

## 2020-01-10 DIAGNOSIS — Z90.49 ACQUIRED ABSENCE OF OTHER SPECIFIED PARTS OF DIGESTIVE TRACT: Chronic | ICD-10-CM

## 2020-01-10 LAB
ALBUMIN SERPL ELPH-MCNC: 4 G/DL — SIGNIFICANT CHANGE UP (ref 3.3–5)
ALP SERPL-CCNC: 75 U/L — SIGNIFICANT CHANGE UP (ref 40–120)
ALT FLD-CCNC: 20 U/L — SIGNIFICANT CHANGE UP (ref 10–45)
ANION GAP SERPL CALC-SCNC: 10 MMOL/L — SIGNIFICANT CHANGE UP (ref 5–17)
AST SERPL-CCNC: 17 U/L — SIGNIFICANT CHANGE UP (ref 10–40)
BASOPHILS # BLD AUTO: 0.01 K/UL — SIGNIFICANT CHANGE UP (ref 0–0.2)
BASOPHILS NFR BLD AUTO: 0.2 % — SIGNIFICANT CHANGE UP (ref 0–2)
BILIRUB SERPL-MCNC: 0.3 MG/DL — SIGNIFICANT CHANGE UP (ref 0.2–1.2)
BUN SERPL-MCNC: 8 MG/DL — SIGNIFICANT CHANGE UP (ref 7–23)
CALCIUM SERPL-MCNC: 9.1 MG/DL — SIGNIFICANT CHANGE UP (ref 8.4–10.5)
CHLORIDE SERPL-SCNC: 101 MMOL/L — SIGNIFICANT CHANGE UP (ref 96–108)
CO2 SERPL-SCNC: 26 MMOL/L — SIGNIFICANT CHANGE UP (ref 22–31)
CREAT SERPL-MCNC: 0.58 MG/DL — SIGNIFICANT CHANGE UP (ref 0.5–1.3)
EOSINOPHIL # BLD AUTO: 0.14 K/UL — SIGNIFICANT CHANGE UP (ref 0–0.5)
EOSINOPHIL NFR BLD AUTO: 2.6 % — SIGNIFICANT CHANGE UP (ref 0–6)
GLUCOSE SERPL-MCNC: 219 MG/DL — HIGH (ref 70–99)
HCT VFR BLD CALC: 37.2 % — SIGNIFICANT CHANGE UP (ref 34.5–45)
HGB BLD-MCNC: 11.6 G/DL — SIGNIFICANT CHANGE UP (ref 11.5–15.5)
IMM GRANULOCYTES NFR BLD AUTO: 0.2 % — SIGNIFICANT CHANGE UP (ref 0–1.5)
LYMPHOCYTES # BLD AUTO: 2.25 K/UL — SIGNIFICANT CHANGE UP (ref 1–3.3)
LYMPHOCYTES # BLD AUTO: 42.5 % — SIGNIFICANT CHANGE UP (ref 13–44)
MCHC RBC-ENTMCNC: 27.9 PG — SIGNIFICANT CHANGE UP (ref 27–34)
MCHC RBC-ENTMCNC: 31.2 GM/DL — LOW (ref 32–36)
MCV RBC AUTO: 89.4 FL — SIGNIFICANT CHANGE UP (ref 80–100)
MONOCYTES # BLD AUTO: 0.45 K/UL — SIGNIFICANT CHANGE UP (ref 0–0.9)
MONOCYTES NFR BLD AUTO: 8.5 % — SIGNIFICANT CHANGE UP (ref 2–14)
NEUTROPHILS # BLD AUTO: 2.43 K/UL — SIGNIFICANT CHANGE UP (ref 1.8–7.4)
NEUTROPHILS NFR BLD AUTO: 46 % — SIGNIFICANT CHANGE UP (ref 43–77)
NRBC # BLD: 0 /100 WBCS — SIGNIFICANT CHANGE UP (ref 0–0)
PLATELET # BLD AUTO: 239 K/UL — SIGNIFICANT CHANGE UP (ref 150–400)
POTASSIUM SERPL-MCNC: 4.1 MMOL/L — SIGNIFICANT CHANGE UP (ref 3.5–5.3)
POTASSIUM SERPL-SCNC: 4.1 MMOL/L — SIGNIFICANT CHANGE UP (ref 3.5–5.3)
PROT SERPL-MCNC: 7.4 G/DL — SIGNIFICANT CHANGE UP (ref 6–8.3)
RBC # BLD: 4.16 M/UL — SIGNIFICANT CHANGE UP (ref 3.8–5.2)
RBC # FLD: 13.1 % — SIGNIFICANT CHANGE UP (ref 10.3–14.5)
SODIUM SERPL-SCNC: 137 MMOL/L — SIGNIFICANT CHANGE UP (ref 135–145)
TROPONIN T SERPL-MCNC: <0.01 NG/ML — SIGNIFICANT CHANGE UP (ref 0–0.01)
WBC # BLD: 5.29 K/UL — SIGNIFICANT CHANGE UP (ref 3.8–10.5)
WBC # FLD AUTO: 5.29 K/UL — SIGNIFICANT CHANGE UP (ref 3.8–10.5)

## 2020-01-10 PROCEDURE — 80053 COMPREHEN METABOLIC PANEL: CPT

## 2020-01-10 PROCEDURE — 85025 COMPLETE CBC W/AUTO DIFF WBC: CPT

## 2020-01-10 PROCEDURE — 93005 ELECTROCARDIOGRAM TRACING: CPT

## 2020-01-10 PROCEDURE — 93971 EXTREMITY STUDY: CPT | Mod: 26,LT

## 2020-01-10 PROCEDURE — 84484 ASSAY OF TROPONIN QUANT: CPT

## 2020-01-10 PROCEDURE — 93971 EXTREMITY STUDY: CPT

## 2020-01-10 PROCEDURE — 36415 COLL VENOUS BLD VENIPUNCTURE: CPT

## 2020-01-10 PROCEDURE — 71046 X-RAY EXAM CHEST 2 VIEWS: CPT | Mod: 26

## 2020-01-10 PROCEDURE — 71046 X-RAY EXAM CHEST 2 VIEWS: CPT

## 2020-01-10 PROCEDURE — 93010 ELECTROCARDIOGRAM REPORT: CPT

## 2020-01-10 PROCEDURE — 99284 EMERGENCY DEPT VISIT MOD MDM: CPT | Mod: 25

## 2020-01-10 PROCEDURE — 99285 EMERGENCY DEPT VISIT HI MDM: CPT

## 2020-01-10 RX ORDER — IBUPROFEN 200 MG
1 TABLET ORAL
Qty: 30 | Refills: 0
Start: 2020-01-10 | End: 2020-01-19

## 2020-01-10 NOTE — ED ADULT TRIAGE NOTE - ARRIVAL INFO ADDITIONAL COMMENTS
awoke with left hand tingling, left arm pain. reports has been happening frequently over past weeks. as well as episodes of "heart racing" which happened last night. denies chest pain, sob, dizziness, palpitations at this time

## 2020-01-10 NOTE — ED PROVIDER NOTE - OBJECTIVE STATEMENT
45 y/o female with hx of DM, migraines c/o LUE pain x 1 day. pt states dull achy pain to entire left arm with tingling to all fingers in left hand. pt notes similar sx's in past when she wakes up in am. no trauma. no cp or sob. pt admits to intermittent palpitations " for a while". no ha or dizziness. no abd pain, n/v/d. no leg pain or swelling. no further complaints.

## 2020-01-10 NOTE — ED PROVIDER NOTE - PHYSICAL EXAMINATION
LUE: non tender. no swelling, redness or bruising. FROM. distal NVI. strength 5/5 b/l UE. no c/t/l spine tenderness. LUE: non tender. no swelling, redness or bruising. FROM. distal NVI. strength 5/5 b/l UE. no c/t/l spine tenderness.  no c/t/l spine tenderness.

## 2020-01-10 NOTE — ED PROVIDER NOTE - NEUROLOGICAL, MLM
Alert and oriented, no focal deficits, no motor or sensory deficits. Alert and oriented, no focal deficits, no motor or sensory deficits. CN II-XI intact b/l

## 2020-01-10 NOTE — ED PROVIDER NOTE - CLINICAL SUMMARY MEDICAL DECISION MAKING FREE TEXT BOX
LUE pain and tingling to hand. neurovascular intact. no evidence of infection. ? nerve pain vs dvt. labs noted, us no acute pathology. + palpitations for many months. ecg no ischemic changes. troponin negative. do not suspect PE. F/u with pmd. return precautions d/w pt.

## 2020-01-10 NOTE — ED PROVIDER NOTE - PATIENT PORTAL LINK FT
You can access the FollowMyHealth Patient Portal offered by [facility] by registering at the following website: [email or web address]. By joining [employer]’s FollowMyHealth portal, you will also be able to view your health information using other applications (apps) compatible with our system.

## 2020-01-10 NOTE — ED ADULT NURSE NOTE - CHPI ED NUR SYMPTOMS NEG
no stiffness/no fever/no difficulty bearing weight/no bruising/no abrasion/no deformity/no back pain

## 2020-01-10 NOTE — ED PROVIDER NOTE - ATTENDING CONTRIBUTION TO CARE
43 yo female h/o DM, migraines c/o LUE pain and numbness on waking today - dull, aching, tingling in fingers.  H/o similar sx in L thumb in the past, usually worse in am.   No neck pain, h/o c spine issues,  trauma. No cp or sob but pt notes intermittent palpitations x several yrs.  Well appearing, nad, nc/at, lung cta, heart reg, abd soft, nt, ext no gross deformity, lue nontender, radial 1+, neck nontender, upper back nontender, motor 5/5, no gross sens deficits, + from lue  HPI suggestive of cerv radiculopathy, no sig concern for dvt, atypical acs.  Plan labs, doppler, cxr, nsaid; likely dc to fu pmd for outpt mri.

## 2020-01-10 NOTE — ED PROVIDER NOTE - NSFOLLOWUPINSTRUCTIONS_ED_ALL_ED_FT
Follow up with your primary care physician in 4-6 days.     Paresthesia    WHAT YOU NEED TO KNOW:    Paresthesia is numbness, tingling, or burning. It can happen in any part of your body, but usually occurs in your legs, feet, arms, or hands.    DISCHARGE INSTRUCTIONS:    Return to the emergency department if:     You have severe pain along with numbness and tingling.      Your legs suddenly become cold. You have trouble moving your legs, and they ache.      You have increased weakness in a part of your body.      You have uncontrolled movements.    Contact your healthcare provider or neurologist if:     Your symptoms do not improve.      You have symptoms in more than one part of your body.      You have questions or concerns about your condition or care.    Manage paresthesia:     Protect the area from injury. You may injure or burn yourself if you lose feeling in the area. Be careful when you touch anything that could be hot. Wear sturdy shoes to protect your feet. Ask about other ways to protect yourself.       Go to physical or occupational therapy if directed. Your provider may recommend therapy if you have a condition such as carpal tunnel syndrome. A physical therapist can teach you exercises to help strengthen the area or increase your ability to move it. An occupational therapist can help you find new ways to do your daily activities.      Manage health conditions that can cause paresthesia. Work with your diabetes specialist if you have uncontrolled diabetes. A dietitian or your healthcare provider can help you create a meal plan if you have low vitamin B levels. Your provider can help you manage your health if you have multiple sclerosis or you had a stroke. It is important to manage health conditions to stop paresthesia or prevent it from getting worse.    Follow up with your healthcare provider or neurologist as directed: Your healthcare provider may refer you to a specialist. Write down your questions so you remember to ask them during your visits.       © Copyright [employer] 2020       back to top                      © Copyright [employer] 2020

## 2020-01-10 NOTE — ED ADULT NURSE NOTE - OBJECTIVE STATEMENT
Presents to ED for left wrist tingling with pain radiating into left arm.  Patient reports tingling and pain symptoms have been occurring intermittently in this extremity for the past few weeks and she has been seen by her PMD previously.

## 2020-01-15 DIAGNOSIS — Z79.4 LONG TERM (CURRENT) USE OF INSULIN: ICD-10-CM

## 2020-01-15 DIAGNOSIS — R00.2 PALPITATIONS: ICD-10-CM

## 2020-01-15 DIAGNOSIS — R20.2 PARESTHESIA OF SKIN: ICD-10-CM

## 2020-01-15 DIAGNOSIS — M79.602 PAIN IN LEFT ARM: ICD-10-CM

## 2020-01-15 DIAGNOSIS — E11.9 TYPE 2 DIABETES MELLITUS WITHOUT COMPLICATIONS: ICD-10-CM

## 2020-01-31 ENCOUNTER — APPOINTMENT (OUTPATIENT)
Dept: NEUROLOGY | Facility: CLINIC | Age: 45
End: 2020-01-31
Payer: MEDICAID

## 2020-01-31 VITALS
TEMPERATURE: 98.5 F | DIASTOLIC BLOOD PRESSURE: 83 MMHG | OXYGEN SATURATION: 96 % | HEART RATE: 83 BPM | HEIGHT: 65 IN | SYSTOLIC BLOOD PRESSURE: 125 MMHG | WEIGHT: 249 LBS | BODY MASS INDEX: 41.48 KG/M2

## 2020-01-31 DIAGNOSIS — R51 HEADACHE: ICD-10-CM

## 2020-01-31 DIAGNOSIS — J32.9 CHRONIC SINUSITIS, UNSPECIFIED: ICD-10-CM

## 2020-01-31 PROCEDURE — 99214 OFFICE O/P EST MOD 30 MIN: CPT

## 2020-02-03 PROBLEM — R51 HEADACHE: Status: ACTIVE | Noted: 2019-08-28

## 2020-02-03 RX ORDER — BLOOD SUGAR DIAGNOSTIC
STRIP MISCELLANEOUS
Qty: 100 | Refills: 0 | Status: ACTIVE | COMMUNITY
Start: 2019-07-19

## 2020-02-03 RX ORDER — ASPIRIN ENTERIC COATED TABLETS 81 MG 81 MG/1
81 TABLET, DELAYED RELEASE ORAL
Qty: 30 | Refills: 0 | Status: ACTIVE | COMMUNITY
Start: 2019-05-24

## 2020-02-03 NOTE — HISTORY OF PRESENT ILLNESS
[FreeTextEntry1] : 44 year-old RH female presents for follow up of headaches first seen by Dr. Bose and followed up by Dr. Bowling. \par \par HPI: 1/31/2020\par Headaches per month: cannot quantify. Patient reports she has not kept a headache diary. \par Noticed new triggers: cold cut meats and cut it out- noticed that in September; November avoided smoked meats. She also states her living arrangements (e.g. projects) have increased her sensitivity to her headaches. \par \par She still describes a bursts in her eyes and sinuses; and often times an out of body sensation. \par Patient states she has not picked up sumatriptan or tried the medication. \par \par HPI 8/28/2019:\par She described her previous headaches as head pressure with squeezing of her heads accompanied by involuntary shaking.  She had workup including MRI Brain without and with ventura and ambulatory EEG that were both negative for acute etiologies.\par \par The involuntary shaking has improved but the headaches have continued.  She describes them now as painful.\par Location: forehead \par Quality: pressure involving her eyes usually.  The pressure feels like everything "lowers" and "as if her head will fly away."  \par - She had more severe pain twice, described as sharp and pressure.  \par \par Aura: eyes build up with pressure, eyes and ears feel clogged, nausea feeling, becomes spaced out.  She then develops drill feeling in back of head with bubbling feeling.  \par - For example, she felt achy upon waking yesterday before developing a headache.\par \par Intensity: worst at 10/10.  Now: 0/10\par Duration: can last for full day\par Frequency: 1-2 times-a-month\par Intervention: sleep, usually doesn't take medications.  Took Tylenol when more severe with relief from 10 to 4/10.\par \par Other workup: She's been cleared by her Ophthalmologist.\par \par \par

## 2020-02-03 NOTE — ASSESSMENT
[FreeTextEntry1] : 44 year-old RH female presents for evaluation of headaches. Headaches do not appear to be migraines; may be related to sinuses and exposure to allergens from housing situation.  Her neurological exam is intact.\par \par Plan:\par 1) Medications - \par a) Acute - Recommended again that she take 1-2 tablets of Tylenol at the onset of the pressure in her head.\par - Prescribed again Sumatriptan 25mg to be taken when she feels the full aura that portends more severe headache.  Discussed side effects.  \par b) Prophylaxis - not a candidate again this tiime since not frequent enough but will reassess over time.\par \par 2) Emphasized she maintain Headache diary including frequency, intensity, duration, interventions to assess if any obvious triggers to her headaches and/or response to the medications.\par \par 3) Continue lifestyle interventions such as sleeping.\par \par 4) Follow up with  about living arrangements (e.g. allergens exposure in housing projects)\par \par 5) ENT Referral for sinuses\par \par follow up in 3 months\par

## 2020-02-03 NOTE — DATA REVIEWED
[de-identified] : CT Head without contrast (Performed 5/7/2019): Unremarkable non-contrast CT of the brain. No change since prior studies. \par MRI Brain without and with ventura (Performed 6/1/2018): \par 1. No acute abnormality. No hydrocephalus, hemorrhage or mass. No evidence of infarction. \par 2. Multifocal punctate signal foci within cerebral white matter.

## 2020-02-08 ENCOUNTER — EMERGENCY (EMERGENCY)
Facility: HOSPITAL | Age: 45
LOS: 1 days | Discharge: HOME | End: 2020-02-08
Attending: EMERGENCY MEDICINE | Admitting: EMERGENCY MEDICINE
Payer: MEDICAID

## 2020-02-08 VITALS
OXYGEN SATURATION: 99 % | TEMPERATURE: 99 F | RESPIRATION RATE: 18 BRPM | DIASTOLIC BLOOD PRESSURE: 60 MMHG | SYSTOLIC BLOOD PRESSURE: 132 MMHG | HEART RATE: 82 BPM

## 2020-02-08 VITALS
WEIGHT: 158.07 LBS | DIASTOLIC BLOOD PRESSURE: 92 MMHG | OXYGEN SATURATION: 98 % | HEART RATE: 107 BPM | TEMPERATURE: 100 F | HEIGHT: 66 IN | RESPIRATION RATE: 18 BRPM | SYSTOLIC BLOOD PRESSURE: 138 MMHG

## 2020-02-08 DIAGNOSIS — Z90.49 ACQUIRED ABSENCE OF OTHER SPECIFIED PARTS OF DIGESTIVE TRACT: Chronic | ICD-10-CM

## 2020-02-08 DIAGNOSIS — Z79.899 OTHER LONG TERM (CURRENT) DRUG THERAPY: ICD-10-CM

## 2020-02-08 DIAGNOSIS — R09.81 NASAL CONGESTION: ICD-10-CM

## 2020-02-08 DIAGNOSIS — J32.9 CHRONIC SINUSITIS, UNSPECIFIED: ICD-10-CM

## 2020-02-08 DIAGNOSIS — E11.9 TYPE 2 DIABETES MELLITUS WITHOUT COMPLICATIONS: ICD-10-CM

## 2020-02-08 LAB
ALBUMIN SERPL ELPH-MCNC: 4.1 G/DL — SIGNIFICANT CHANGE UP (ref 3.3–5)
ALP SERPL-CCNC: 85 U/L — SIGNIFICANT CHANGE UP (ref 40–120)
ALT FLD-CCNC: 23 U/L — SIGNIFICANT CHANGE UP (ref 10–45)
ANION GAP SERPL CALC-SCNC: 12 MMOL/L — SIGNIFICANT CHANGE UP (ref 5–17)
APPEARANCE UR: CLEAR — SIGNIFICANT CHANGE UP
AST SERPL-CCNC: 21 U/L — SIGNIFICANT CHANGE UP (ref 10–40)
BASOPHILS # BLD AUTO: 0.02 K/UL — SIGNIFICANT CHANGE UP (ref 0–0.2)
BASOPHILS NFR BLD AUTO: 0.1 % — SIGNIFICANT CHANGE UP (ref 0–2)
BILIRUB SERPL-MCNC: 0.4 MG/DL — SIGNIFICANT CHANGE UP (ref 0.2–1.2)
BILIRUB UR-MCNC: NEGATIVE — SIGNIFICANT CHANGE UP
BUN SERPL-MCNC: 9 MG/DL — SIGNIFICANT CHANGE UP (ref 7–23)
CALCIUM SERPL-MCNC: 9.4 MG/DL — SIGNIFICANT CHANGE UP (ref 8.4–10.5)
CHLORIDE SERPL-SCNC: 97 MMOL/L — SIGNIFICANT CHANGE UP (ref 96–108)
CO2 SERPL-SCNC: 25 MMOL/L — SIGNIFICANT CHANGE UP (ref 22–31)
COLOR SPEC: YELLOW — SIGNIFICANT CHANGE UP
CREAT SERPL-MCNC: 0.56 MG/DL — SIGNIFICANT CHANGE UP (ref 0.5–1.3)
DIFF PNL FLD: NEGATIVE — SIGNIFICANT CHANGE UP
EOSINOPHIL # BLD AUTO: 0.07 K/UL — SIGNIFICANT CHANGE UP (ref 0–0.5)
EOSINOPHIL NFR BLD AUTO: 0.5 % — SIGNIFICANT CHANGE UP (ref 0–6)
FLU A RESULT: SIGNIFICANT CHANGE UP
FLU A RESULT: SIGNIFICANT CHANGE UP
FLUAV AG NPH QL: SIGNIFICANT CHANGE UP
FLUBV AG NPH QL: SIGNIFICANT CHANGE UP
GLUCOSE SERPL-MCNC: 245 MG/DL — HIGH (ref 70–99)
GLUCOSE UR QL: 100
HCG SERPL-ACNC: <0 MIU/ML — SIGNIFICANT CHANGE UP
HCT VFR BLD CALC: 37 % — SIGNIFICANT CHANGE UP (ref 34.5–45)
HGB BLD-MCNC: 11.9 G/DL — SIGNIFICANT CHANGE UP (ref 11.5–15.5)
IMM GRANULOCYTES NFR BLD AUTO: 0.4 % — SIGNIFICANT CHANGE UP (ref 0–1.5)
KETONES UR-MCNC: NEGATIVE — SIGNIFICANT CHANGE UP
LACTATE SERPL-SCNC: 1.3 MMOL/L — SIGNIFICANT CHANGE UP (ref 0.5–2)
LEUKOCYTE ESTERASE UR-ACNC: NEGATIVE — SIGNIFICANT CHANGE UP
LYMPHOCYTES # BLD AUTO: 17.5 % — SIGNIFICANT CHANGE UP (ref 13–44)
LYMPHOCYTES # BLD AUTO: 2.45 K/UL — SIGNIFICANT CHANGE UP (ref 1–3.3)
MCHC RBC-ENTMCNC: 27.9 PG — SIGNIFICANT CHANGE UP (ref 27–34)
MCHC RBC-ENTMCNC: 32.2 GM/DL — SIGNIFICANT CHANGE UP (ref 32–36)
MCV RBC AUTO: 86.9 FL — SIGNIFICANT CHANGE UP (ref 80–100)
MONOCYTES # BLD AUTO: 1.01 K/UL — HIGH (ref 0–0.9)
MONOCYTES NFR BLD AUTO: 7.2 % — SIGNIFICANT CHANGE UP (ref 2–14)
NEUTROPHILS # BLD AUTO: 10.37 K/UL — HIGH (ref 1.8–7.4)
NEUTROPHILS NFR BLD AUTO: 74.3 % — SIGNIFICANT CHANGE UP (ref 43–77)
NITRITE UR-MCNC: NEGATIVE — SIGNIFICANT CHANGE UP
NRBC # BLD: 0 /100 WBCS — SIGNIFICANT CHANGE UP (ref 0–0)
PH UR: 6.5 — SIGNIFICANT CHANGE UP (ref 5–8)
PLATELET # BLD AUTO: 262 K/UL — SIGNIFICANT CHANGE UP (ref 150–400)
POTASSIUM SERPL-MCNC: 4.3 MMOL/L — SIGNIFICANT CHANGE UP (ref 3.5–5.3)
POTASSIUM SERPL-SCNC: 4.3 MMOL/L — SIGNIFICANT CHANGE UP (ref 3.5–5.3)
PROT SERPL-MCNC: 8 G/DL — SIGNIFICANT CHANGE UP (ref 6–8.3)
PROT UR-MCNC: NEGATIVE MG/DL — SIGNIFICANT CHANGE UP
RBC # BLD: 4.26 M/UL — SIGNIFICANT CHANGE UP (ref 3.8–5.2)
RBC # FLD: 12.6 % — SIGNIFICANT CHANGE UP (ref 10.3–14.5)
RSV RESULT: SIGNIFICANT CHANGE UP
RSV RNA RESP QL NAA+PROBE: SIGNIFICANT CHANGE UP
SODIUM SERPL-SCNC: 134 MMOL/L — LOW (ref 135–145)
SP GR SPEC: 1.01 — SIGNIFICANT CHANGE UP (ref 1–1.03)
UROBILINOGEN FLD QL: 0.2 E.U./DL — SIGNIFICANT CHANGE UP
WBC # BLD: 13.97 K/UL — HIGH (ref 3.8–10.5)
WBC # FLD AUTO: 13.97 K/UL — HIGH (ref 3.8–10.5)

## 2020-02-08 PROCEDURE — 99283 EMERGENCY DEPT VISIT LOW MDM: CPT | Mod: 25

## 2020-02-08 PROCEDURE — 36415 COLL VENOUS BLD VENIPUNCTURE: CPT

## 2020-02-08 PROCEDURE — 84702 CHORIONIC GONADOTROPIN TEST: CPT

## 2020-02-08 PROCEDURE — 85025 COMPLETE CBC W/AUTO DIFF WBC: CPT

## 2020-02-08 PROCEDURE — 87631 RESP VIRUS 3-5 TARGETS: CPT

## 2020-02-08 PROCEDURE — 96360 HYDRATION IV INFUSION INIT: CPT

## 2020-02-08 PROCEDURE — 83605 ASSAY OF LACTIC ACID: CPT

## 2020-02-08 PROCEDURE — 81003 URINALYSIS AUTO W/O SCOPE: CPT

## 2020-02-08 PROCEDURE — 80053 COMPREHEN METABOLIC PANEL: CPT

## 2020-02-08 PROCEDURE — 99284 EMERGENCY DEPT VISIT MOD MDM: CPT

## 2020-02-08 RX ORDER — SODIUM CHLORIDE 9 MG/ML
1000 INJECTION INTRAMUSCULAR; INTRAVENOUS; SUBCUTANEOUS ONCE
Refills: 0 | Status: COMPLETED | OUTPATIENT
Start: 2020-02-08 | End: 2020-02-08

## 2020-02-08 RX ORDER — ACETAMINOPHEN 500 MG
975 TABLET ORAL ONCE
Refills: 0 | Status: COMPLETED | OUTPATIENT
Start: 2020-02-08 | End: 2020-02-08

## 2020-02-08 RX ADMIN — Medication 100 MILLIGRAM(S): at 01:51

## 2020-02-08 RX ADMIN — SODIUM CHLORIDE 1000 MILLILITER(S): 9 INJECTION INTRAMUSCULAR; INTRAVENOUS; SUBCUTANEOUS at 01:25

## 2020-02-08 RX ADMIN — Medication 975 MILLIGRAM(S): at 01:59

## 2020-02-08 RX ADMIN — Medication 975 MILLIGRAM(S): at 01:29

## 2020-02-08 RX ADMIN — SODIUM CHLORIDE 1000 MILLILITER(S): 9 INJECTION INTRAMUSCULAR; INTRAVENOUS; SUBCUTANEOUS at 00:25

## 2020-02-08 NOTE — ED PROVIDER NOTE - DR. NAME
Post-Care Instructions: I reviewed with the patient in detail post-care instructions. Patient is to wear sunprotection, and avoid picking at any of the treated lesions. Pt may apply Vaseline to crusted or scabbing areas. Detail Level: Simple Number Of Freeze-Thaw Cycles: 2 freeze-thaw cycles Duration Of Freeze Thaw-Cycle (Seconds): 5 Render Post-Care Instructions In Note?: no Consent: The patient's consent was obtained including but not limited to risks of crusting, scabbing, blistering, scarring, darker or lighter pigmentary change, recurrence, incomplete removal and infection. Patricia Renteria)

## 2020-02-08 NOTE — ED PROVIDER NOTE - PHYSICAL EXAMINATION
VITAL SIGNS: I have reviewed nursing notes and confirm.  CONSTITUTIONAL: Well-developed; well-nourished; in no acute distress.   SKIN:  warm and dry, no acute rash.   HEAD:  normocephalic, atraumatic.  EYES: Scleral injection bilaterally. EOM intact; conjunctiva clear.  ENT: Nasal congestion. No nasal discharge; airway clear.   NECK: Supple; non tender.  CARD: Tachycardic. S1, S2 normal; no murmurs, gallops, or rubs.   RESP:  Clear to auscultation b/l, no wheezes, rales or rhonchi.  ABD: Normal bowel sounds; soft; non-distended; non-tender; no guarding/ rebound.  EXT: Normal ROM. No clubbing, cyanosis or edema. 2+ pulses to b/l ue/le.  NEURO: Alert, oriented, grossly unremarkable  PSYCH: Cooperative, mood and affect appropriate.

## 2020-02-08 NOTE — ED ADULT NURSE REASSESSMENT NOTE - NS ED NURSE REASSESS COMMENT FT1
assessment and intervention as noted, davian. well, provided with warm blanket for comfort, awaiting provider eval./further orders, pt. utd on poc, with understanding verbalized

## 2020-02-08 NOTE — ED PROVIDER NOTE - OBJECTIVE STATEMENT
43 y/o F with PMHx of DM presents to the ED with complaints of nasal congestion and headache, today with coughing and felt as if she couldn't catch her breath. When she got home today, she felt hot and then felt palpitations. EMS called and came to ED, given O2 en route. Never had similar symptoms in the past. No flu shot this season. No chest pain or SOB.

## 2020-02-08 NOTE — ED PROVIDER NOTE - NSFOLLOWUPINSTRUCTIONS_ED_ALL_ED_FT
Follow up with primary physician and  ENT in 1-2 days. Return to ED for worsening condition.       Sinusitis, Adult    Sinusitis is inflammation of your sinuses. Sinuses are hollow spaces in the bones around your face. Your sinuses are located:  Around your eyes.In the middle of your forehead.Behind your nose.In your cheekbones.Mucus normally drains out of your sinuses. When your nasal tissues become inflamed or swollen, mucus can become trapped or blocked. This allows bacteria, viruses, and fungi to grow, which leads to infection. Most infections of the sinuses are caused by a virus.  Sinusitis can develop quickly. It can last for up to 4 weeks (acute) or for more than 12 weeks (chronic). Sinusitis often develops after a cold.  What are the causes?  This condition is caused by anything that creates swelling in the sinuses or stops mucus from draining. This includes:  Allergies.Asthma.Infection from bacteria or viruses.Deformities or blockages in your nose or sinuses.Abnormal growths in the nose (nasal polyps).Pollutants, such as chemicals or irritants in the air.Infection from fungi (rare).What increases the risk?  You are more likely to develop this condition if you:  Have a weak body defense system (immune system).Do a lot of swimming or diving.Overuse nasal sprays.Smoke.What are the signs or symptoms?  The main symptoms of this condition are pain and a feeling of pressure around the affected sinuses. Other symptoms include:  Stuffy nose or congestion.Thick drainage from your nose.Swelling and warmth over the affected sinuses.Headache.Upper toothache.A cough that may get worse at night.Extra mucus that collects in the throat or the back of the nose (postnasal drip).Decreased sense of smell and taste.Fatigue.A fever.Sore throat.Bad breath.How is this diagnosed?  This condition is diagnosed based on:  Your symptoms.Your medical history.A physical exam.Tests to find out if your condition is acute or chronic. This may include:  Checking your nose for nasal polyps.Viewing your sinuses using a device that has a light (endoscope).Testing for allergies or bacteria.Imaging tests, such as an MRI or CT scan.In rare cases, a bone biopsy may be done to rule out more serious types of fungal sinus disease.  How is this treated?  Treatment for sinusitis depends on the cause and whether your condition is chronic or acute.  If caused by a virus, your symptoms should go away on their own within 10 days. You may be given medicines to relieve symptoms. They include:  Medicines that shrink swollen nasal passages (topical intranasal decongestants). Medicines that treat allergies (antihistamines).A spray that eases inflammation of the nostrils (topical intranasal corticosteroids).Rinses that help get rid of thick mucus in your nose (nasal saline washes).If caused by bacteria, your health care provider may recommend waiting to see if your symptoms improve. Most bacterial infections will get better without antibiotic medicine. You may be given antibiotics if you have:  A severe infection. A weak immune system.If caused by narrow nasal passages or nasal polyps, you may need to have surgery.Follow these instructions at home:  Medicines     Take, use, or apply over-the-counter and prescription medicines only as told by your health care provider. These may include nasal sprays.If you were prescribed an antibiotic medicine, take it as told by your health care provider. Do not stop taking the antibiotic even if you start to feel better.Hydrate and humidify        Drink enough fluid to keep your urine pale yellow. Staying hydrated will help to thin your mucus.Use a cool mist humidifier to keep the humidity level in your home above 50%.Inhale steam for 10–15 minutes, 3–4 times a day, or as told by your health care provider. You can do this in the bathroom while a hot shower is running.Limit your exposure to cool or dry air.Rest     Rest as much as possible.Sleep with your head raised (elevated).Make sure you get enough sleep each night.General instructions        Apply a warm, moist washcloth to your face 3–4 times a day or as told by your health care provider. This will help with discomfort.Wash your hands often with soap and water to reduce your exposure to germs. If soap and water are not available, use hand .Do not smoke. Avoid being around people who are smoking (secondhand smoke).Keep all follow-up visits as told by your health care provider. This is important.Contact a health care provider if:  You have a fever.Your symptoms get worse.Your symptoms do not improve within 10 days.Get help right away if:  You have a severe headache.You have persistent vomiting.You have severe pain or swelling around your face or eyes.You have vision problems.You develop confusion.Your neck is stiff.You have trouble breathing.Summary  Sinusitis is soreness and inflammation of your sinuses. Sinuses are hollow spaces in the bones around your face.This condition is caused by nasal tissues that become inflamed or swollen. The swelling traps or blocks the flow of mucus. This allows bacteria, viruses, and fungi to grow, which leads to infection.If you were prescribed an antibiotic medicine, take it as told by your health care provider. Do not stop taking the antibiotic even if you start to feel better.Keep all follow-up visits as told by your health care provider. This is important.This information is not intended to replace advice given to you by your health care provider. Make sure you discuss any questions you have with your health care provider.    Document Released: 12/18/2006 Document Revised: 05/20/2019 Document Reviewed: 05/20/2019  [employer] Interactive Patient Education © 2019 [employer]

## 2020-02-08 NOTE — ED PROVIDER NOTE - PATIENT PORTAL LINK FT
You can access the FollowMyHealth Patient Portal offered by [facility] by registering at the following website: [email or web address]. By joining [employer]’s FollowMyHealth portal, you will also be able to view your health information using other applications (apps) compatible with our system.

## 2020-02-08 NOTE — ED ADULT NURSE NOTE - NSIMPLEMENTINTERV_GEN_ALL_ED
Implemented All Universal Safety Interventions:  [place] to call system. Call bell, personal items and telephone within reach. Instruct patient to call for assistance. Room bathroom lighting operational. Non-slip footwear when patient is off stretcher. Physically safe environment: no spills, clutter or unnecessary equipment. Stretcher in lowest position, wheels locked, appropriate side rails in place.

## 2020-02-08 NOTE — ED PROVIDER NOTE - CLINICAL SUMMARY MEDICAL DECISION MAKING FREE TEXT BOX
45 y/o F with URI and flu-like symptoms, will check labs, give IV fluids, Tylenol 975pO and reassess. 45 y/o F with URI and flu-like symptoms, will check labs, give IV fluids, Tylenol 975pO and reassess.  Pt given doxy po for sinusitis (allergy to pcn) Rx. written. instructions given to follow up with primary physician in 1-2 days, return to ED for worsening condition. Pt expresses understanding.

## 2020-02-08 NOTE — ED ADULT NURSE NOTE - OBJECTIVE STATEMENT
pt. presents from home with c/o cough, chills, fever, generalized body aches since last pm, progressively worsening until present, denies n/v, sob, or c/p

## 2020-02-24 ENCOUNTER — APPOINTMENT (OUTPATIENT)
Dept: OTOLARYNGOLOGY | Facility: CLINIC | Age: 45
End: 2020-02-24

## 2020-02-25 ENCOUNTER — APPOINTMENT (OUTPATIENT)
Dept: OBGYN | Facility: CLINIC | Age: 45
End: 2020-02-25
Payer: MEDICAID

## 2020-02-25 DIAGNOSIS — Z01.419 ENCOUNTER FOR GYNECOLOGICAL EXAMINATION (GENERAL) (ROUTINE) W/OUT ABNORMAL FINDINGS: ICD-10-CM

## 2020-02-25 DIAGNOSIS — N92.6 IRREGULAR MENSTRUATION, UNSPECIFIED: ICD-10-CM

## 2020-02-25 PROCEDURE — 99386 PREV VISIT NEW AGE 40-64: CPT

## 2020-02-27 ENCOUNTER — EMERGENCY (EMERGENCY)
Facility: HOSPITAL | Age: 45
LOS: 1 days | Discharge: HOME | End: 2020-02-27
Attending: EMERGENCY MEDICINE | Admitting: EMERGENCY MEDICINE
Payer: MEDICAID

## 2020-02-27 VITALS
SYSTOLIC BLOOD PRESSURE: 140 MMHG | HEART RATE: 84 BPM | RESPIRATION RATE: 17 BRPM | WEIGHT: 248.02 LBS | OXYGEN SATURATION: 98 % | TEMPERATURE: 97 F | HEIGHT: 65 IN | DIASTOLIC BLOOD PRESSURE: 90 MMHG

## 2020-02-27 VITALS
SYSTOLIC BLOOD PRESSURE: 133 MMHG | RESPIRATION RATE: 16 BRPM | OXYGEN SATURATION: 99 % | HEART RATE: 67 BPM | DIASTOLIC BLOOD PRESSURE: 67 MMHG | TEMPERATURE: 98 F

## 2020-02-27 DIAGNOSIS — Z91.048 OTHER NONMEDICINAL SUBSTANCE ALLERGY STATUS: ICD-10-CM

## 2020-02-27 DIAGNOSIS — J45.20 MILD INTERMITTENT ASTHMA, UNCOMPLICATED: ICD-10-CM

## 2020-02-27 DIAGNOSIS — Z88.0 ALLERGY STATUS TO PENICILLIN: ICD-10-CM

## 2020-02-27 DIAGNOSIS — Z90.49 ACQUIRED ABSENCE OF OTHER SPECIFIED PARTS OF DIGESTIVE TRACT: Chronic | ICD-10-CM

## 2020-02-27 DIAGNOSIS — Z79.84 LONG TERM (CURRENT) USE OF ORAL HYPOGLYCEMIC DRUGS: ICD-10-CM

## 2020-02-27 DIAGNOSIS — E11.9 TYPE 2 DIABETES MELLITUS WITHOUT COMPLICATIONS: ICD-10-CM

## 2020-02-27 DIAGNOSIS — M54.6 PAIN IN THORACIC SPINE: ICD-10-CM

## 2020-02-27 LAB
ALBUMIN SERPL ELPH-MCNC: 4.2 G/DL — SIGNIFICANT CHANGE UP (ref 3.3–5)
ALP SERPL-CCNC: 71 U/L — SIGNIFICANT CHANGE UP (ref 40–120)
ALT FLD-CCNC: 20 U/L — SIGNIFICANT CHANGE UP (ref 10–45)
ANION GAP SERPL CALC-SCNC: 14 MMOL/L — SIGNIFICANT CHANGE UP (ref 5–17)
APTT BLD: 29.7 SEC — SIGNIFICANT CHANGE UP (ref 27.5–36.3)
AST SERPL-CCNC: 23 U/L — SIGNIFICANT CHANGE UP (ref 10–40)
BASOPHILS # BLD AUTO: 0.02 K/UL — SIGNIFICANT CHANGE UP (ref 0–0.2)
BASOPHILS NFR BLD AUTO: 0.3 % — SIGNIFICANT CHANGE UP (ref 0–2)
BILIRUB SERPL-MCNC: 0.3 MG/DL — SIGNIFICANT CHANGE UP (ref 0.2–1.2)
BUN SERPL-MCNC: 8 MG/DL — SIGNIFICANT CHANGE UP (ref 7–23)
CALCIUM SERPL-MCNC: 9 MG/DL — SIGNIFICANT CHANGE UP (ref 8.4–10.5)
CHLORIDE SERPL-SCNC: 101 MMOL/L — SIGNIFICANT CHANGE UP (ref 96–108)
CK MB CFR SERPL CALC: 1.3 NG/ML — SIGNIFICANT CHANGE UP (ref 0–6.7)
CO2 SERPL-SCNC: 25 MMOL/L — SIGNIFICANT CHANGE UP (ref 22–31)
CREAT SERPL-MCNC: 0.53 MG/DL — SIGNIFICANT CHANGE UP (ref 0.5–1.3)
EOSINOPHIL # BLD AUTO: 0.09 K/UL — SIGNIFICANT CHANGE UP (ref 0–0.5)
EOSINOPHIL NFR BLD AUTO: 1.3 % — SIGNIFICANT CHANGE UP (ref 0–6)
GLUCOSE SERPL-MCNC: 139 MG/DL — HIGH (ref 70–99)
HCT VFR BLD CALC: 37.1 % — SIGNIFICANT CHANGE UP (ref 34.5–45)
HGB BLD-MCNC: 11.9 G/DL — SIGNIFICANT CHANGE UP (ref 11.5–15.5)
HPV HIGH+LOW RISK DNA PNL CVX: NOT DETECTED
IMM GRANULOCYTES NFR BLD AUTO: 0.3 % — SIGNIFICANT CHANGE UP (ref 0–1.5)
INR BLD: 1 — SIGNIFICANT CHANGE UP (ref 0.88–1.16)
LYMPHOCYTES # BLD AUTO: 3.19 K/UL — SIGNIFICANT CHANGE UP (ref 1–3.3)
LYMPHOCYTES # BLD AUTO: 47.5 % — HIGH (ref 13–44)
MCHC RBC-ENTMCNC: 28.1 PG — SIGNIFICANT CHANGE UP (ref 27–34)
MCHC RBC-ENTMCNC: 32.1 GM/DL — SIGNIFICANT CHANGE UP (ref 32–36)
MCV RBC AUTO: 87.7 FL — SIGNIFICANT CHANGE UP (ref 80–100)
MONOCYTES # BLD AUTO: 0.4 K/UL — SIGNIFICANT CHANGE UP (ref 0–0.9)
MONOCYTES NFR BLD AUTO: 6 % — SIGNIFICANT CHANGE UP (ref 2–14)
NEUTROPHILS # BLD AUTO: 3 K/UL — SIGNIFICANT CHANGE UP (ref 1.8–7.4)
NEUTROPHILS NFR BLD AUTO: 44.6 % — SIGNIFICANT CHANGE UP (ref 43–77)
NRBC # BLD: 0 /100 WBCS — SIGNIFICANT CHANGE UP (ref 0–0)
PLATELET # BLD AUTO: 273 K/UL — SIGNIFICANT CHANGE UP (ref 150–400)
POTASSIUM SERPL-MCNC: 4.4 MMOL/L — SIGNIFICANT CHANGE UP (ref 3.5–5.3)
POTASSIUM SERPL-SCNC: 4.4 MMOL/L — SIGNIFICANT CHANGE UP (ref 3.5–5.3)
PROT SERPL-MCNC: 8.2 G/DL — SIGNIFICANT CHANGE UP (ref 6–8.3)
PROTHROM AB SERPL-ACNC: 11.4 SEC — SIGNIFICANT CHANGE UP (ref 10–12.9)
RBC # BLD: 4.23 M/UL — SIGNIFICANT CHANGE UP (ref 3.8–5.2)
RBC # FLD: 13.1 % — SIGNIFICANT CHANGE UP (ref 10.3–14.5)
SODIUM SERPL-SCNC: 140 MMOL/L — SIGNIFICANT CHANGE UP (ref 135–145)
TROPONIN T SERPL-MCNC: <0.01 NG/ML — SIGNIFICANT CHANGE UP (ref 0–0.01)
WBC # BLD: 6.72 K/UL — SIGNIFICANT CHANGE UP (ref 3.8–10.5)
WBC # FLD AUTO: 6.72 K/UL — SIGNIFICANT CHANGE UP (ref 3.8–10.5)

## 2020-02-27 PROCEDURE — 99284 EMERGENCY DEPT VISIT MOD MDM: CPT | Mod: 25

## 2020-02-27 PROCEDURE — 82553 CREATINE MB FRACTION: CPT

## 2020-02-27 PROCEDURE — 71046 X-RAY EXAM CHEST 2 VIEWS: CPT | Mod: 26

## 2020-02-27 PROCEDURE — 99284 EMERGENCY DEPT VISIT MOD MDM: CPT

## 2020-02-27 PROCEDURE — 80053 COMPREHEN METABOLIC PANEL: CPT

## 2020-02-27 PROCEDURE — 71046 X-RAY EXAM CHEST 2 VIEWS: CPT

## 2020-02-27 PROCEDURE — 74174 CTA ABD&PLVS W/CONTRAST: CPT | Mod: 26

## 2020-02-27 PROCEDURE — 85025 COMPLETE CBC W/AUTO DIFF WBC: CPT

## 2020-02-27 PROCEDURE — 36415 COLL VENOUS BLD VENIPUNCTURE: CPT

## 2020-02-27 PROCEDURE — 71275 CT ANGIOGRAPHY CHEST: CPT | Mod: 26

## 2020-02-27 PROCEDURE — 85730 THROMBOPLASTIN TIME PARTIAL: CPT

## 2020-02-27 PROCEDURE — 94640 AIRWAY INHALATION TREATMENT: CPT

## 2020-02-27 PROCEDURE — 71275 CT ANGIOGRAPHY CHEST: CPT

## 2020-02-27 PROCEDURE — 74174 CTA ABD&PLVS W/CONTRAST: CPT

## 2020-02-27 PROCEDURE — 84484 ASSAY OF TROPONIN QUANT: CPT

## 2020-02-27 PROCEDURE — 82550 ASSAY OF CK (CPK): CPT

## 2020-02-27 PROCEDURE — 85610 PROTHROMBIN TIME: CPT

## 2020-02-27 RX ORDER — ACETAMINOPHEN 500 MG
650 TABLET ORAL ONCE
Refills: 0 | Status: COMPLETED | OUTPATIENT
Start: 2020-02-27 | End: 2020-02-27

## 2020-02-27 RX ORDER — IPRATROPIUM/ALBUTEROL SULFATE 18-103MCG
3 AEROSOL WITH ADAPTER (GRAM) INHALATION ONCE
Refills: 0 | Status: COMPLETED | OUTPATIENT
Start: 2020-02-27 | End: 2020-02-27

## 2020-02-27 RX ORDER — METHOCARBAMOL 500 MG/1
500 TABLET, FILM COATED ORAL ONCE
Refills: 0 | Status: DISCONTINUED | OUTPATIENT
Start: 2020-02-27 | End: 2020-02-27

## 2020-02-27 RX ADMIN — Medication 650 MILLIGRAM(S): at 13:23

## 2020-02-27 RX ADMIN — Medication 3 MILLILITER(S): at 13:24

## 2020-02-27 NOTE — ED ADULT NURSE NOTE - PMH
H. pylori infection    Migraines    Type 2 diabetes mellitus without complication, without long-term current use of insulin 46 y/o F with significant PMH of Lupus, CHF s/p AICD+PPM, DVT on xarelto, COPD, RA, laryngeal cancer s/p excision in november, s/p 2 week history of esophageal thrush presents with laryngeal candidiasis which failed outpatient PO antifungal treatment. Course complicated by acute renal failure, which has since improved. Later had laryngeal edema identified treated empirically with IV steroid which improved. Course later complicated by acute exacerbation of underlying COPD which is now improving. Now course complicated by sepsis secondary to LUE cellulitis and adjacent superficial thrombophlebitis as well as Klebsiella R middle lobe pneumonia which is resolving. Patient still sounds very diffuse BL on auscultation and sounds congested with cough, however, physical appearance seems to be improved. Plan is to discharge on Augmentin with prednisone taper and diflucan, as well as to follow up with ENT for laryngoscopy and pulmonology for further assessment and management. 48 y/o F with significant PMH of Lupus, CHF s/p AICD+PPM, DVT on xarelto, COPD, RA, laryngeal cancer s/p excision in november, s/p 2 week history of esophageal thrush presents with laryngeal candidiasis which failed outpatient PO antifungal treatment. Course complicated by acute renal failure, which has since improved. Later had laryngeal edema identified treated empirically with IV steroid which improved. Course later complicated by acute exacerbation of underlying COPD which is now improving. Now course complicated by sepsis secondary to LUE cellulitis and adjacent superficial thrombophlebitis as well as Klebsiella R middle lobe pneumonia which is resolving. Plan is to discharge on Augmentin with prednisone taper and diflucan, as well as to follow up with ENT for laryngoscopy and pulmonology for further assessment and management.

## 2020-02-27 NOTE — ED PROVIDER NOTE - RESPIRATORY, MLM
Breath sounds clear and equal bilaterally. + bilateral back pain reproducible on exam. + paraspinal tenderness , no midline pain

## 2020-02-27 NOTE — ED ADULT NURSE NOTE - NSFALLRSKASSESSDT_ED_ALL_ED
Patient is growing appropriately, now exclusively eating breast milk. Anticipatory guidance given. [place] done today in the office. 2 mo. Vaccines given, including 2nd hep B immunization. R/B/Se discussed with pt's mother.   
Recommend less frequent washing and using gentle baby soap when washing. Will continue to monitor at next [facility].   
Right eye drainage consistent with viral conjunctivitis. Recommend warm compresses multiple times daily. Anticipatory guidance given regarding course of illness, if no improvement in 3-4 days, return to clinic for appointment.   
27-Feb-2020 13:46

## 2020-02-27 NOTE — ED PROVIDER NOTE - OBJECTIVE STATEMENT
46 y/o f with h/o asthma, diabetic on oral meds present to ED c/o upper back pain worse with movement and intermittent sob. She report to has asthma and her apartment is constantly exposure to second hand smoke. She state of similar pain on right side and now has shift to left. Denies fever, URI, no h/o PE. Last cardiac work up a year ago with no acute findings. Patient was advised to use a inhaler but does not. No ARORA

## 2020-02-27 NOTE — ED ADULT TRIAGE NOTE - CHIEF COMPLAINT QUOTE
Patient, speaking in full sentences, complaining of SOB, worse when laying flat, bilateral back pain, neck pain and chest tightness, since yesterday.  Patient denies any N/V, dizziness, fevers or any other complaints at this time.  EKG in progress.

## 2020-02-27 NOTE — ED PROVIDER NOTE - CARE PLAN
Principal Discharge DX:	Back pain  Secondary Diagnosis:	Reactive airway disease, mild intermittent, uncomplicated

## 2020-02-27 NOTE — ED PROVIDER NOTE - ATTENDING CONTRIBUTION TO CARE
44 y/o with paraspinal upper to mid back pain. intermittent pain. no trauma. CXR- WNL.  tylenol for pain. EKG - NS R 81

## 2020-02-27 NOTE — ED ADULT NURSE NOTE - OBJECTIVE STATEMENT
pt received into spot F A&Ox3 ambulatory appears comfortable arrives via walk in triage for eval of atraumatic back pain worse with touch and movement no obvious deformity/ injuries. no bruising recent falls no also with some SOB no wheezing on exam resp even and unlabored.

## 2020-02-27 NOTE — ED PROVIDER NOTE - PATIENT PORTAL LINK FT
You can access the FollowMyHealth Patient Portal offered by [facility] by registering at the following website: [email or web address]. By joining [employer]’s FollowMyHealth portal, you will also be able to view your health information using other applications (apps) compatible with our system.

## 2020-02-27 NOTE — ED PROVIDER NOTE - NSFOLLOWUPINSTRUCTIONS_ED_ALL_ED_FT
Recommend Motrin for pain on her back as necessary. Also suggest to use inhaler for irritant to lungs for second hand smoke. Follow up with PMD or pulmonary doctor.     Musculoskeletal Pain  Musculoskeletal pain refers to aches and pains in your bones, joints, muscles, and the tissues that surround them. This pain can occur in any part of the body. It can last for a short time (acute) or a long time (chronic).  A physical exam, lab tests, and imaging studies may be done to find the cause of your musculoskeletal pain.  Follow these instructions at home:     Lifestyle     Try to control or lower your stress levels. Stress increases muscle tension and can worsen musculoskeletal pain. It is important to recognize when you are anxious or stressed and learn ways to manage it. This may include:  Meditation or yoga.Cognitive or behavioral therapy.Acupuncture or massage therapy.You may continue all activities unless the activities cause more pain. When the pain gets better, slowly resume your normal activities. Gradually increase the intensity and duration of your activities or exercise.Managing pain, stiffness, and swelling     Take over-the-counter and prescription medicines only as told by your health care provider.When your pain is severe, bed rest may be helpful. Lie or sit in any position that is comfortable, but get out of bed and walk around at least every couple of hours.If directed, apply heat to the affected area as often as told by your health care provider. Use the heat source that your health care provider recommends, such as a moist heat pack or a heating pad.  Place a towel between your skin and the heat source.Leave the heat on for 20–30 minutes.Remove the heat if your skin turns bright red. This is especially important if you are unable to feel pain, heat, or cold. You may have a greater risk of getting burned.If directed, put ice on the painful area.  Put ice in a plastic bag.Place a towel between your skin and the bag.Leave the ice on for 20 minutes, 2–3 times a day.General instructions     Your health care provider may recommend that you see a physical therapist. This person can help you come up with a safe exercise program. Do any exercises as told by your physical therapist.Keep all follow-up visits, including any physical therapy visits, as told by your health care providers. This is important.Contact a health care provider if:  Your pain gets worse.Medicines do not help ease your pain.You cannot use the part of your body that hurts, such as your arm, leg, or neck.You have trouble sleeping.You have trouble doing your normal activities.Get help right away if:  You have a new injury and your pain is worse or different.You feel numb or you have tingling in the painful area.Summary  Musculoskeletal pain refers to aches and pains in your bones, joints, muscles, and the tissues that surround them.This pain can occur in any part of the body.Your health care provider may recommend that you see a physical therapist. This person can help you come up with a safe exercise program. Do any exercises as told by your physical therapist.Lower your stress level. Stress can worsen musculoskeletal pain. Ways to lower stress may include meditation, yoga, cognitive or behavioral therapy, acupuncture, and massage therapy.This information is not intended to replace advice given to you by your health care provider. Make sure you discuss any questions you have with your health care provider.    Document Released: 12/18/2006 Document Revised: 01/17/2018 Document Reviewed: 01/17/2018  [employer] Interactive Patient Education © 2020 [employer]

## 2020-02-28 ENCOUNTER — EMERGENCY (EMERGENCY)
Facility: HOSPITAL | Age: 45
LOS: 1 days | Discharge: HOME | End: 2020-02-28
Admitting: EMERGENCY MEDICINE
Payer: MEDICAID

## 2020-02-28 VITALS
WEIGHT: 248.02 LBS | SYSTOLIC BLOOD PRESSURE: 152 MMHG | HEART RATE: 76 BPM | OXYGEN SATURATION: 97 % | HEIGHT: 65 IN | DIASTOLIC BLOOD PRESSURE: 88 MMHG | TEMPERATURE: 99 F | RESPIRATION RATE: 18 BRPM

## 2020-02-28 DIAGNOSIS — L76.22 POSTPROCEDURAL HEMORRHAGE OF SKIN AND SUBCUTANEOUS TISSUE FOLLOWING OTHER PROCEDURE: ICD-10-CM
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
## 2020-02-28 DIAGNOSIS — Z90.49 ACQUIRED ABSENCE OF OTHER SPECIFIED PARTS OF DIGESTIVE TRACT: Chronic | ICD-10-CM

## 2020-02-28 DIAGNOSIS — M79.601 PAIN IN RIGHT ARM: ICD-10-CM

## 2020-02-28 PROCEDURE — 99283 EMERGENCY DEPT VISIT LOW MDM: CPT

## 2020-02-28 PROCEDURE — 99282 EMERGENCY DEPT VISIT SF MDM: CPT

## 2020-02-28 NOTE — ED PROVIDER NOTE - MUSCULOSKELETAL, MLM
R anterior antecubital fossa with no acute signs of infection, palpable small hematoma, nonexpanding, distal pulses intact, cap refill on fingers intact, no changes to temperature or color from hand, no motor/sensory deficit. R anterior antecubital fossa with no acute signs of infection, palpable small hematoma, non expanding, distal pulses intact, cap refill on fingers intact, no changes to temperature or color from hand, no motor/sensory deficit.

## 2020-02-28 NOTE — ED PROVIDER NOTE - OBJECTIVE STATEMENT
46 y/o F presents to the ED for evaluation to her R arm where she had an IV placed yesterday. Pt felt a small bump on her arm and was concerned that it may be a blood clot. Pt endorses pain at the site, but denies fever, chills, chest pain or SOB.
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
## 2020-02-28 NOTE — ED PROVIDER NOTE - PATIENT PORTAL LINK FT
You can access the FollowMyHealth Patient Portal offered by [facility] by registering at the following website: [email or web address]. By joining [employer]’s FollowMyHealth portal, you will also be able to view your health information using other applications (apps) compatible with our system.

## 2020-02-28 NOTE — ED PROVIDER NOTE - CLINICAL SUMMARY MEDICAL DECISION MAKING FREE TEXT BOX
Patient with right arm mild palpable contusion. Well appearing with normal exam. Recommend cool compress and tylenol for pain PRN.

## 2020-02-28 NOTE — ED PROVIDER NOTE - NSFOLLOWUPINSTRUCTIONS_ED_ALL_ED_FT
[facility]    Contusion  A contusion is a deep bruise. Contusions are the result of a blunt injury to tissues and muscle fibers under the skin. The injury causes bleeding under the skin. The skin overlying the contusion may turn blue, purple, or yellow. Minor injuries will give you a painless contusion, but more severe injuries cause contusions that may stay painful and swollen for a few weeks.  Follow these instructions at home:  Pay attention to any changes in your symptoms. Let your health care provider know about them. Take these actions to relieve your pain.  Managing pain, stiffness, and swelling        Use resting, icing, applying pressure (compression), and raising (elevating) the injured area. This is often called the RICE strategy.  Rest the injured area. Return to your normal activities as told by your health care provider. Ask your health care provider what activities are safe for you.If directed, put ice on the injured area:  Put ice in a plastic bag.Place a towel between your skin and the bag.Leave the ice on for 20 minutes, 2–3 times per day.If directed, apply light compression to the injured area using an elastic bandage. Make sure the bandage is not wrapped too tightly. Remove and reapply the bandage as directed by your health care provider.If possible, raise (elevate) the injured area above the level of your heart while you are sitting or lying down.General instructions     Take over-the-counter and prescription medicines only as told by your health care provider.Keep all follow-up visits as told by your health care provider. This is important.Contact a health care provider if:  Your symptoms do not improve after several days of treatment.Your symptoms get worse.You have difficulty moving the injured area.Get help right away if:  You have severe pain.You have numbness in a hand or foot.Your hand or foot turns pale or cold.Summary  A contusion is a deep bruise.Contusions are the result of a blunt injury to tissues and muscle fibers under the skin.It is treated with rest, ice, compression, and elevation. You may be given over-the-counter medicines for pain.Contact a health care provider if your symptoms do not improve, or get worse. Get help right away if you have severe pain, have numbness, or the area turns pale or cold.This information is not intended to replace advice given to you by your health care provider. Make sure you discuss any questions you have with your health care provider.    Document Released: 09/27/2006 Document Revised: 08/08/2019 Document Reviewed: 08/08/2019  [employer] Interactive Patient Education © 2020 [employer] Contusion  A contusion is a deep bruise. Contusions are the result of a blunt injury to tissues and muscle fibers under the skin. The injury causes bleeding under the skin. The skin overlying the contusion may turn blue, purple, or yellow. Minor injuries will give you a painless contusion, but more severe injuries cause contusions that may stay painful and swollen for a few weeks.  Follow these instructions at home:  Pay attention to any changes in your symptoms. Let your health care provider know about them. Take these actions to relieve your pain.  Managing pain, stiffness, and swelling        Use resting, icing, applying pressure (compression), and raising (elevating) the injured area. This is often called the RICE strategy.  Rest the injured area. Return to your normal activities as told by your health care provider. Ask your health care provider what activities are safe for you.If directed, put ice on the injured area:  Put ice in a plastic bag.Place a towel between your skin and the bag.Leave the ice on for 20 minutes, 2–3 times per day.If directed, apply light compression to the injured area using an elastic bandage. Make sure the bandage is not wrapped too tightly. Remove and reapply the bandage as directed by your health care provider.If possible, raise (elevate) the injured area above the level of your heart while you are sitting or lying down.General instructions     Take over-the-counter and prescription medicines only as told by your health care provider.Keep all follow-up visits as told by your health care provider. This is important.Contact a health care provider if:  Your symptoms do not improve after several days of treatment.Your symptoms get worse.You have difficulty moving the injured area.Get help right away if:  You have severe pain.You have numbness in a hand or foot.Your hand or foot turns pale or cold.Summary  A contusion is a deep bruise.Contusions are the result of a blunt injury to tissues and muscle fibers under the skin.It is treated with rest, ice, compression, and elevation. You may be given over-the-counter medicines for pain.Contact a health care provider if your symptoms do not improve, or get worse. Get help right away if you have severe pain, have numbness, or the area turns pale or cold.This information is not intended to replace advice given to you by your health care provider. Make sure you discuss any questions you have with your health care provider.    Document Released: 09/27/2006 Document Revised: 08/08/2019 Document Reviewed: 08/08/2019  Elsevier Interactive Patient Education © 2020 [employer] Inc[employer]

## 2020-03-13 ENCOUNTER — OUTPATIENT (OUTPATIENT)
Dept: OUTPATIENT SERVICES | Facility: HOSPITAL | Age: 45
LOS: 1 days | End: 2020-03-13
Payer: MEDICAID

## 2020-03-13 ENCOUNTER — APPOINTMENT (OUTPATIENT)
Dept: ULTRASOUND IMAGING | Facility: HOSPITAL | Age: 45
End: 2020-03-13
Payer: MEDICAID

## 2020-03-13 DIAGNOSIS — Z90.49 ACQUIRED ABSENCE OF OTHER SPECIFIED PARTS OF DIGESTIVE TRACT: Chronic | ICD-10-CM

## 2020-03-13 PROCEDURE — 76830 TRANSVAGINAL US NON-OB: CPT | Mod: 26

## 2020-03-13 PROCEDURE — 76700 US EXAM ABDOM COMPLETE: CPT

## 2020-03-13 PROCEDURE — 76856 US EXAM PELVIC COMPLETE: CPT | Mod: 26

## 2020-03-13 PROCEDURE — 76856 US EXAM PELVIC COMPLETE: CPT

## 2020-03-13 PROCEDURE — 76700 US EXAM ABDOM COMPLETE: CPT | Mod: 26

## 2020-03-13 PROCEDURE — 76830 TRANSVAGINAL US NON-OB: CPT

## 2020-04-28 NOTE — ED PROVIDER NOTE - CPE EDP CARDIAC NORM
Hematology Oncology Telephone Encounter    Patient contacted by telephone today. Telephone contact was initiated to provide care to minimize the patient's exposure potential exposure and or transmission of COVID-19.    Patient consented to telephone visit. They were made aware that this visit is taking the place on an in-person visit and will be submitted to their insurance. Patient name and birth date confirmed. Without the patient being seen and evaluated in person, there is a risk that the information and/or assessment may be incomplete or inaccurate.    Contacted patient by: at home      Medical issues discussed: colon CA, anemia    Pertinent findings: doing well, bowels    Assessment or diagnosis: above    Any medication adjustments or refills: none      Labs ordered: 3 mos    Follow-up recommendations/plan of treatment:  1. Colon CA:  She is doing well.  We will continue with surveillance.  She will need a colonoscopy and CT at the 1 year leander -July 2020.  2. Anemia:  Excellent results with prior iron therapy. Continue observation.     F/u 3 mos with labs        The patient indicated understanding of the diagnosis and agreed with the plan of care. The patient is encouraged to call between now and next visit with any problems, questions or concerns that arise.    DURATION OF VISIT: 16835: 11 to 20 minutes          normal...

## 2020-05-14 NOTE — ED ADULT NURSE NOTE - CAS EDN DISCHARGE ASSESSMENT
Pt states it started a couple days ago. She thought it was a virus, but has had no improvement. Cannot eat or drink anything as vomits back up. Denies diarrhea. States she does not have a thermometer at home, but has felt 'warm' on occasions. Vomit is yellow/green. Vomiting about 7-8x/day.
Alert and oriented to person, place and time
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
## 2020-06-19 NOTE — ED ADULT NURSE NOTE - PAIN RATING/NUMBER SCALE (0-10): REST
Chief Complaint   Patient presents with   • Follow-up     4 week follow up, knee. Feeling better.    • Diabetes     follow up labs       SUBJECTIVE:  Dimas Madrid is a 58 year old male presenting for follow-up of his knee pain.  He states that is completely gone at this time and he feels much better.  He assures me he is taking medications as directed.  Patient has been trying to stay active.  Denies any lightheadedness, dizziness, shortness of breath or chest discomfort.  No extremity edema.  Overall he feels as though very well.  He is also here for follow-up of his diabetes, hypertension, hyperlipidemia.    PAST MEDICAL HISTORY:    Eczematous dermatitis                                         Carpal tunnel syndrome                                          Comment: RIGHT    Constipation                                                  Anxiety                                                       Type II or unspecified type diabetes mellitus *               Hyperlipemia                                                  Vertigo                                                       Other and unspecified noninfectious gastroente*               Hemorrhage of rectum and anus                                 Depressive disorder, not elsewhere classified                 GERD (gastroesophageal reflux disease)                        Obesity                                                       Anemia                                                        CKD (chronic kidney disease), stage I                         Bipolar disorder (CMS/[facility])                                    Gout                                                          Allergic rhinitis                                             Androgen deficiency                                           Essential hypertension, benign                                Gender identity disorder                                      Allergy                                                        Patient Active Problem List   Diagnosis   • Hyperlipidemia   • Anxiety   • Hemorrhage of rectum and anus   • GERD (gastroesophageal reflux disease)   • Gout   • Allergic rhinitis   • Androgen deficiency   • Pain in shoulder   • Dysuria   • Screening for colon cancer   • Proteinuria   • Atopic dermatitis   • Restless legs   • Hypertensive urgency   • Essential hypertension   • Uncontrolled secondary diabetes mellitus with stage 2 CKD (GFR 60-89) (CMS/[facility])   • DM (diabetes mellitus), type 2, uncontrolled (CMS/[facility])   • Noncompliance with diet and medication regimen   • Diabetes mellitus type 2, insulin-dependent   • Hypertriglyceridemia   • Encounter for long-term (current) use of insulin (CMS/[facility])   • Polypharmacy   • Type 2 diabetes mellitus with skin complication (CMS/[facility])   • Bipolar disorder (CMS/[facility])   • Gender identity disorder   • Major depressive disorder, recurrent episode, mild (CMS/[facility])   • Hypertension due to endocrine disorder   • Peripheral polyneuropathy   • Neuropathic pain of both feet   • Polyneuropathy in diseases classified elsewhere (CMS/[facility])   • Class 2 obesity due to excess calories with body mass index (BMI) of 37.0 to 37.9 in adult       Past Surgical History:   Procedure Laterality Date   • Appendectomy  7/1990   • Carpal tunnel release  09/06/2012    Right endoscopic carpal tunnel release. Right upper arm excision of soft tissue mass with the gross appearance consistent with lipoma.   • Spinal puncture,lumbar,diagnostic  06/15/2001       Current Outpatient Medications   Medication Sig Dispense Refill   • insulin aspart 70-30 (NOVOLOG MIX 70/30 FLEXPEN) (70-30) 100 UNIT/ML pen-injector Inject 110 units before breakfast and 110 unit before supper. 60 mL 12   • esomeprazole (NEXIUM) 40 MG capsule Take 1 capsule by mouth daily. 31 capsule 7   • aspirin 81 MG EC tablet Take 1 tablet by mouth daily. 31 tablet 7   • metformin (GLUCOPHAGE) 1000 MG tablet Take one and one-half talbet  by mouth every morning and one tablet every evening. 78 tablet 7   • spironolactone (ALDACTONE) 25 MG tablet Take 1 tablet by mouth daily. 31 tablet 7   • cetirizine (ZYRTEC) 10 MG tablet Take 1 tablet by mouth daily. 31 tablet 7   • blood glucose meter Test blood sugar three times daily as directed. 1 kit 0   • blood glucose test strip Patient is to test blood sugars three times daily. Diagnosis: E11.65 300 each 11   • LORazepam (ATIVAN) 0.5 MG tablet Take 1 tablet by mouth 2 times daily. 60 tablet 5   • DULoxetine (CYMBALTA) 60 MG capsule Take 1 capsule by mouth daily. 30 capsule 5   • mometasone (ELOCON) 0.1 % ointment apply by topical route twice daily to scaly patches on trunk, upper and lower extremities until smooth 90 g 0   • Lancet Devices (ONE TOUCH DELICA LANCING DEV) Misc Use as directed. 1 each 0   • gabapentin (NEURONTIN) 300 MG capsule Take 2 capsules by mouth every morning AND 2 capsules every evening, along with 100mg capsule to equal 700mg. 124 capsule 5   • glipiZIDE (GLUCOTROL) 10 MG tablet Take 1 tablet by mouth 2 times daily (before meals). 60 tablet 11   • omega-3 acid ethyl esters (LOVAZA) 1 g capsule Take 2 capsules by mouth 2 times daily. 90 capsule 3   • Icosapent Ethyl (VASCEPA) 1 g Cap Take 2 g (2 capsules) by mouth 2 times daily. 120 capsule 11   • gabapentin (NEURONTIN) 100 MG capsule Take 1 capsule twice daily along with 600 mg to total 700 mg twice daily. 60 capsule 5   • Pyridoxine HCl (VITAMIN B-6) 50 MG tablet Take 1 tablet by mouth daily. 100 tablet 0   • montelukast (SINGULAIR) 10 MG tablet Take 1 tablet by mouth every evening. 31 tablet 5   • metoPROLOL tartrate (LOPRESSOR) 100 MG tablet Take 1 tablet by mouth 2 times daily. 60 tablet 11   • amLODIPine (NORVASC) 2.5 MG tablet Take 1 tablet by mouth daily. 30 tablet 11   • rosuvastatin (CRESTOR) 40 MG tablet Take 1 tablet by mouth daily. 31 tablet 11   • cyanocobalamin 1000 MCG/ML injection Inject 1 mL as directed every 30 days.  1 mL 11   • magnesium oxide 400 (241.3 Mg) MG Tab Take 1 tablet by mouth 2 times daily. 62 tablet 11   • fenofibrate (TRICOR) 145 MG tablet Take 1 tablet by mouth daily. Indications: High Amount of Triglycerides in the Blood 90 tablet 3   • hydrOXYzine (ATARAX) 10 MG tablet Take 1 tablet by mouth every 6 hours as needed for Itching. 30 tablet 0   • clobetasol (TEMOVATE) 0.05 % ointment apply by topical route 2 times every day a thin layer to the affected area(s) on the arms and legs 120 g 0   • Apremilast (OTEZLA) 30 MG Tab Take by mouth daily. PRN for itching on bilateral legs. Prescribed by [facility].Take as directed by dermatology.     • DISPENSE Please dispense new BGT meter for patient. Test blood sugar twice daily.  Dx:  E11.65 1 each 0   • blood glucose lancets Test blood sugar 3 times daily. Diagnosis: E11.65 100 each 11   • Lancet Devices (ONE TOUCH DELICA LANCING DEV) Misc use to test blood sugar three times daily. 1 each 0   • mometasone (ELOCON) 0.1 % ointment apply topically twice daily for 2 weeks to affected areas on body as needed when flaring until flat 90 g 1   • betamethasone dipropionate (DIPROSONE) 0.05 % cream Apply topically 2 times daily for 2 weeks 30 g 0   • benzoyl peroxide 10 % gel Apply topically 2 times daily. 60 g 1   • albuterol-ipratropium 2.5 mg/0.5 mg (DUONEB) 0.5-2.5 (3) MG/3ML nebulizer solution Take 3 mLs by nebulization every 6 hours as needed for Wheezing. 360 mL 0   • acetaminophen (TYLENOL) 325 MG tablet Take 2 tablets by mouth every 6 hours as needed for Pain. 30 tablet 0   • albuterol 108 (90 BASE) MCG/ACT inhaler Inhale 2 puffs into the lungs every 4 hours as needed for Shortness of Breath or Wheezing. 1 Inhaler 11   • olmesartan (BENICAR) 40 MG tablet Take 1 tablet by mouth daily. 90 tablet 3   • allopurinol (ZYLOPRIM) 100 MG tablet Take 1 tablet by mouth daily. 90 tablet 3   • rosuvastatin (CRESTOR) 10 MG tablet Take 1 tablet by mouth nightly. 31 tablet 11   •  rosuvastatin (CRESTOR) 10 MG tablet Take 1 tablet by mouth daily. Please cut in half prior to bubble packing so patient can take easier. 30 tablet 11     No current facility-administered medications for this visit.        ALLERGIES:   Allergen Reactions   • Augmentin [Amoclan] SWELLING     Possible reaction, pt felt like throat was swollen, symptoms resolved on their own   • Chocolate   (Food Or Med) DIARRHEA   • Indocin RASH   • Mushroom   (Food) NAUSEA   • Niaspan [Niacin (Antihyperlipidemic)] Other (See Comments)     flushing   • Trazodone      jittery   • Wellbutrin [Bupropion Hcl]      jittery       Social History     Tobacco Use   • Smoking status: Never Smoker   • Smokeless tobacco: Never Used   Substance Use Topics   • Alcohol use: No     Frequency: Never     Drinks per session: 1 or 2     Binge frequency: Never   • Drug use: No        Family History   Problem Relation Age of Onset   • Diabetes Father    • Cancer, Lung Father    • Patient is unaware of any medical problems Mother    • Diabetes Paternal Grandmother    • Diabetes Sister    • Neuropathy Sister         OBJECTIVE:  Vital Signs:   Visit Vitals  /84   Pulse (!) 120   Resp 18   Ht 5' 6\" (1.676 m)   Wt 98.7 kg   SpO2 100%   BMI 35.12 kg/m²     General: Patient appears well.  HEENT: Conjunctivae are neither pale nor injected. Mucous membranes are moist. Nares are clear.     Lungs: Clear to auscultation bilaterally without crackles or wheezes. Respirations are even and unlabored.  Heart: Regular rate and rhythm without murmurs, rubs, or gallops.    Extremities: No cyanosis, clubbing, or edema.  Neurologic: Alert and oriented times 3.   Discuss laboratory testing from 06/15/2020.    ASSESSMENT AND PLAN:    Dimas was seen today for follow-up and diabetes.    Diagnoses and all orders for this visit:    Diabetes mellitus type 2, insulin-dependent  -     MICROALBUMIN URINE RANDOM; Future  -     GLYCOHEMOGLOBIN; Future  -     LIPID PANEL WITH REFLEX;  Future  -     COMPREHENSIVE METABOLIC PANEL; Future    Need for vaccination  -     HEP B VACC ADULT 3 DOSE, IM    Peripheral polyneuropathy    Knee pain, unspecified chronicity, unspecified laterality    Mixed hyperlipidemia    Essential hypertension    Elevated uric acid in blood    Noncompliance with medication regimen    Other orders  -     olmesartan (BENICAR) 40 MG tablet; Take 1 tablet by mouth daily.  -     allopurinol (ZYLOPRIM) 100 MG tablet; Take 1 tablet by mouth daily.  -     insulin aspart 70-30 (NOVOLOG MIX 70/30 FLEXPEN) (70-30) 100 UNIT/ML pen-injector; Inject 110 units before breakfast and 110 unit before supper.       Discussed at great length his medications.  Given his elevated uric acid level and recent need to 5 taking him off his hydrochlorothiazide and just on the olmesartan.  We may need to adjust medication for blood pressure in the near future.  We will add allopurinol of.  I also did discuss with patient quite a lot about his diabetes.  His A1 c has now gone up again to 12.1.  Did discuss the importance of checking his blood sugars and also taking his medications.  I did discuss with patient on his chart it does show that he still has 12 refills of his 70 30 insulin.  He is very adamant that he still has 2 pins at home.  I have asked that he talk with the pharmacist to make sure that he is rain this correctly as I suspect that he is not picked up any further medication and he is not taking his ins.  I did discuss with patient I have great concern over his uncontrolled diabetes as I suspect that his uncontrolled blood sugars have been contributing to many other of his health problems.  He is instructed to go to pharmacy to get insulin and also continue to check his sugars.  I have also discussed with him the possibility of going back to diabetic education and nutritional services.  He is to follow-up in 3 months.   5

## 2020-10-03 NOTE — ED ADULT NURSE NOTE - ED CARDIAC RATE
Problem: Pain:  Goal: Pain level will decrease  Description: Pain level will decrease  Outcome: Met This Shift  Goal: Control of acute pain  Description: Control of acute pain  Outcome: Met This Shift normal

## 2020-10-26 ENCOUNTER — EMERGENCY (EMERGENCY)
Facility: HOSPITAL | Age: 45
LOS: 1 days | Discharge: HOME | End: 2020-10-26
Attending: EMERGENCY MEDICINE | Admitting: EMERGENCY MEDICINE
Payer: MEDICAID

## 2020-10-26 VITALS
OXYGEN SATURATION: 98 % | HEART RATE: 85 BPM | RESPIRATION RATE: 18 BRPM | TEMPERATURE: 98 F | SYSTOLIC BLOOD PRESSURE: 167 MMHG | HEIGHT: 65 IN | WEIGHT: 240.08 LBS | DIASTOLIC BLOOD PRESSURE: 100 MMHG

## 2020-10-26 DIAGNOSIS — R10.13 EPIGASTRIC PAIN: ICD-10-CM

## 2020-10-26 DIAGNOSIS — R53.1 WEAKNESS: ICD-10-CM

## 2020-10-26 DIAGNOSIS — Z88.0 ALLERGY STATUS TO PENICILLIN: ICD-10-CM

## 2020-10-26 DIAGNOSIS — F41.9 ANXIETY DISORDER, UNSPECIFIED: ICD-10-CM

## 2020-10-26 DIAGNOSIS — Z90.49 ACQUIRED ABSENCE OF OTHER SPECIFIED PARTS OF DIGESTIVE TRACT: Chronic | ICD-10-CM

## 2020-10-26 PROCEDURE — 99285 EMERGENCY DEPT VISIT HI MDM: CPT

## 2020-10-26 PROCEDURE — 93010 ELECTROCARDIOGRAM REPORT: CPT

## 2020-10-27 VITALS
OXYGEN SATURATION: 96 % | DIASTOLIC BLOOD PRESSURE: 98 MMHG | SYSTOLIC BLOOD PRESSURE: 164 MMHG | HEART RATE: 80 BPM | RESPIRATION RATE: 18 BRPM

## 2020-10-27 LAB
ALBUMIN SERPL ELPH-MCNC: 4.2 G/DL — SIGNIFICANT CHANGE UP (ref 3.3–5)
ALP SERPL-CCNC: 79 U/L — SIGNIFICANT CHANGE UP (ref 40–120)
ALT FLD-CCNC: 19 U/L — SIGNIFICANT CHANGE UP (ref 10–45)
ANION GAP SERPL CALC-SCNC: 11 MMOL/L — SIGNIFICANT CHANGE UP (ref 5–17)
APPEARANCE UR: CLEAR — SIGNIFICANT CHANGE UP
AST SERPL-CCNC: 15 U/L — SIGNIFICANT CHANGE UP (ref 10–40)
B-OH-BUTYR SERPL-SCNC: 0.2 MMOL/L — SIGNIFICANT CHANGE UP
BACTERIA # UR AUTO: PRESENT /HPF
BASOPHILS # BLD AUTO: 0.02 K/UL — SIGNIFICANT CHANGE UP (ref 0–0.2)
BASOPHILS NFR BLD AUTO: 0.3 % — SIGNIFICANT CHANGE UP (ref 0–2)
BILIRUB SERPL-MCNC: 0.3 MG/DL — SIGNIFICANT CHANGE UP (ref 0.2–1.2)
BILIRUB UR-MCNC: NEGATIVE — SIGNIFICANT CHANGE UP
BUN SERPL-MCNC: 9 MG/DL — SIGNIFICANT CHANGE UP (ref 7–23)
CALCIUM SERPL-MCNC: 9.4 MG/DL — SIGNIFICANT CHANGE UP (ref 8.4–10.5)
CHLORIDE SERPL-SCNC: 97 MMOL/L — SIGNIFICANT CHANGE UP (ref 96–108)
CO2 SERPL-SCNC: 28 MMOL/L — SIGNIFICANT CHANGE UP (ref 22–31)
COLOR SPEC: YELLOW — SIGNIFICANT CHANGE UP
CREAT SERPL-MCNC: 0.63 MG/DL — SIGNIFICANT CHANGE UP (ref 0.5–1.3)
DIFF PNL FLD: NEGATIVE — SIGNIFICANT CHANGE UP
EOSINOPHIL # BLD AUTO: 0.11 K/UL — SIGNIFICANT CHANGE UP (ref 0–0.5)
EOSINOPHIL NFR BLD AUTO: 1.7 % — SIGNIFICANT CHANGE UP (ref 0–6)
EPI CELLS # UR: SIGNIFICANT CHANGE UP /HPF (ref 0–5)
GLUCOSE SERPL-MCNC: 268 MG/DL — HIGH (ref 70–99)
GLUCOSE UR QL: >=1000
HCT VFR BLD CALC: 38.7 % — SIGNIFICANT CHANGE UP (ref 34.5–45)
HGB BLD-MCNC: 12.4 G/DL — SIGNIFICANT CHANGE UP (ref 11.5–15.5)
IMM GRANULOCYTES NFR BLD AUTO: 0.2 % — SIGNIFICANT CHANGE UP (ref 0–1.5)
KETONES UR-MCNC: NEGATIVE — SIGNIFICANT CHANGE UP
LACTATE SERPL-SCNC: 1.2 MMOL/L — SIGNIFICANT CHANGE UP (ref 0.5–2)
LEUKOCYTE ESTERASE UR-ACNC: NEGATIVE — SIGNIFICANT CHANGE UP
LIDOCAIN IGE QN: 29 U/L — SIGNIFICANT CHANGE UP (ref 7–60)
LYMPHOCYTES # BLD AUTO: 2.51 K/UL — SIGNIFICANT CHANGE UP (ref 1–3.3)
LYMPHOCYTES # BLD AUTO: 39 % — SIGNIFICANT CHANGE UP (ref 13–44)
MAGNESIUM SERPL-MCNC: 1.7 MG/DL — SIGNIFICANT CHANGE UP (ref 1.6–2.6)
MCHC RBC-ENTMCNC: 27.6 PG — SIGNIFICANT CHANGE UP (ref 27–34)
MCHC RBC-ENTMCNC: 32 GM/DL — SIGNIFICANT CHANGE UP (ref 32–36)
MCV RBC AUTO: 86 FL — SIGNIFICANT CHANGE UP (ref 80–100)
MONOCYTES # BLD AUTO: 0.52 K/UL — SIGNIFICANT CHANGE UP (ref 0–0.9)
MONOCYTES NFR BLD AUTO: 8.1 % — SIGNIFICANT CHANGE UP (ref 2–14)
NEUTROPHILS # BLD AUTO: 3.27 K/UL — SIGNIFICANT CHANGE UP (ref 1.8–7.4)
NEUTROPHILS NFR BLD AUTO: 50.7 % — SIGNIFICANT CHANGE UP (ref 43–77)
NITRITE UR-MCNC: NEGATIVE — SIGNIFICANT CHANGE UP
NRBC # BLD: 0 /100 WBCS — SIGNIFICANT CHANGE UP (ref 0–0)
OSMOLALITY SERPL: 293 MOSM/KG — SIGNIFICANT CHANGE UP (ref 275–300)
PH UR: 7 — SIGNIFICANT CHANGE UP (ref 5–8)
PLATELET # BLD AUTO: 248 K/UL — SIGNIFICANT CHANGE UP (ref 150–400)
POTASSIUM SERPL-MCNC: 4.1 MMOL/L — SIGNIFICANT CHANGE UP (ref 3.5–5.3)
POTASSIUM SERPL-SCNC: 4.1 MMOL/L — SIGNIFICANT CHANGE UP (ref 3.5–5.3)
PROT SERPL-MCNC: 7.7 G/DL — SIGNIFICANT CHANGE UP (ref 6–8.3)
PROT UR-MCNC: ABNORMAL MG/DL
RBC # BLD: 4.5 M/UL — SIGNIFICANT CHANGE UP (ref 3.8–5.2)
RBC # FLD: 12.7 % — SIGNIFICANT CHANGE UP (ref 10.3–14.5)
RBC CASTS # UR COMP ASSIST: < 5 /HPF — SIGNIFICANT CHANGE UP
SODIUM SERPL-SCNC: 136 MMOL/L — SIGNIFICANT CHANGE UP (ref 135–145)
SP GR SPEC: 1.02 — SIGNIFICANT CHANGE UP (ref 1–1.03)
UROBILINOGEN FLD QL: 0.2 E.U./DL — SIGNIFICANT CHANGE UP
WBC # BLD: 6.44 K/UL — SIGNIFICANT CHANGE UP (ref 3.8–10.5)
WBC # FLD AUTO: 6.44 K/UL — SIGNIFICANT CHANGE UP (ref 3.8–10.5)
WBC UR QL: < 5 /HPF — SIGNIFICANT CHANGE UP

## 2020-10-27 PROCEDURE — 85025 COMPLETE CBC W/AUTO DIFF WBC: CPT

## 2020-10-27 PROCEDURE — 96374 THER/PROPH/DIAG INJ IV PUSH: CPT

## 2020-10-27 PROCEDURE — 82962 GLUCOSE BLOOD TEST: CPT

## 2020-10-27 PROCEDURE — 93005 ELECTROCARDIOGRAM TRACING: CPT

## 2020-10-27 PROCEDURE — 96361 HYDRATE IV INFUSION ADD-ON: CPT

## 2020-10-27 PROCEDURE — 87086 URINE CULTURE/COLONY COUNT: CPT

## 2020-10-27 PROCEDURE — 87184 SC STD DISK METHOD PER PLATE: CPT

## 2020-10-27 PROCEDURE — 80053 COMPREHEN METABOLIC PANEL: CPT

## 2020-10-27 PROCEDURE — 81001 URINALYSIS AUTO W/SCOPE: CPT

## 2020-10-27 PROCEDURE — 83930 ASSAY OF BLOOD OSMOLALITY: CPT

## 2020-10-27 PROCEDURE — 83735 ASSAY OF MAGNESIUM: CPT

## 2020-10-27 PROCEDURE — 83605 ASSAY OF LACTIC ACID: CPT

## 2020-10-27 PROCEDURE — 83690 ASSAY OF LIPASE: CPT

## 2020-10-27 PROCEDURE — 36415 COLL VENOUS BLD VENIPUNCTURE: CPT

## 2020-10-27 PROCEDURE — 82010 KETONE BODYS QUAN: CPT

## 2020-10-27 PROCEDURE — 99284 EMERGENCY DEPT VISIT MOD MDM: CPT | Mod: 25

## 2020-10-27 RX ORDER — SODIUM CHLORIDE 9 MG/ML
1000 INJECTION INTRAMUSCULAR; INTRAVENOUS; SUBCUTANEOUS ONCE
Refills: 0 | Status: COMPLETED | OUTPATIENT
Start: 2020-10-27 | End: 2020-10-27

## 2020-10-27 RX ORDER — FAMOTIDINE 10 MG/ML
20 INJECTION INTRAVENOUS ONCE
Refills: 0 | Status: COMPLETED | OUTPATIENT
Start: 2020-10-27 | End: 2020-10-27

## 2020-10-27 RX ADMIN — Medication 30 MILLILITER(S): at 00:57

## 2020-10-27 RX ADMIN — FAMOTIDINE 20 MILLIGRAM(S): 10 INJECTION INTRAVENOUS at 00:55

## 2020-10-27 RX ADMIN — SODIUM CHLORIDE 1000 MILLILITER(S): 9 INJECTION INTRAMUSCULAR; INTRAVENOUS; SUBCUTANEOUS at 02:40

## 2020-10-27 RX ADMIN — SODIUM CHLORIDE 1000 MILLILITER(S): 9 INJECTION INTRAMUSCULAR; INTRAVENOUS; SUBCUTANEOUS at 00:50

## 2020-10-27 NOTE — ED PROVIDER NOTE - ATTENDING CONTRIBUTION TO CARE
45F nonsmoker, predm just recently started on metformin, gerd on nexium prn, prior h pylori infection s/p abx (2018), no c/o 2-3d more severe reflux sx (regurgitation when lying flat/belching) w/ assoc mild epigastric burning pain. +intermittent nausea, none currently. +loose watery brown stools x1-2 episodes per day x2d. +malaise.+increased anxiety (home/social issues). prior endoscopy reportedly w/o e/o gastritis/ulcer (2018). has not seen GI doctor since due to dissatisfactory care, now requesting [facility] GI referral. no fever/chills, no uri/cough, no sob, no abd pain/vomiting, no diarrhea, no hematochezia/melena, no dysuria, no rash, no prior covid, no trauma, no etoh/ivdu. avss. nontoxic. NAD. no systemic sx. no active cp. no acute resp distress. no acute surgical abd. no e/o sepsis. LFTs/lipase neg. ua neg. elevated glc w/o dka/hhs. s/p ivf. sx resolved s/p gi cocktail. tolerating po. pt feels safe going home. no indication for inpatient hospitalization at this time. will dc w/ outpatient GI fu, pt already w/ nexium, strict return precautions. pt agrees w/ plan. questions answered.     I saw and discussed the care of the pt directly with the ACP while the pt was in the ED. i have reviewed the ACP note and agree w/ the history, exam and plan of care other than as noted above. 45F nonsmoker, predm just recently started on metformin, gerd on nexium prn, prior h pylori infection s/p abx (2018), now c/o 2-3d recurrent reflux sx (regurgitation when lying flat/belching) w/ assoc mild epigastric burning pain. +intermittent nausea, none currently. +loose watery brown stools x1-2 episodes per day x2d. +malaise. +increased anxiety (home/social issues). prior endoscopy reportedly w/o e/o gastritis/ulcer (2018). has not seen GI doctor since due to dissatisfactory care, now requesting [facility] GI referral. no fever/chills, no uri/cough, no sob, no abd pain/vomiting, no diarrhea, no hematochezia/melena, no dysuria, no rash, no prior covid, no trauma, no etoh/ivdu. avss. nontoxic. NAD. no systemic sx. no active cp. no acute resp distress. no acute surgical abd. no e/o sepsis. LFTs/lipase neg. ua neg. elevated glc w/o dka/hhs. s/p ivf. sx resolved s/p gi cocktail. tolerating po. pt feels safe going home. no indication for imaging or inpatient hospitalization at this time. will dc w/ outpatient GI fu, pt already w/ nexium, strict return precautions. pt agrees w/ plan. questions answered.     I saw and discussed the care of the pt directly with the ACP while the pt was in the ED. i have reviewed the ACP note and agree w/ the history, exam and plan of care other than as noted above.

## 2020-10-27 NOTE — ED PROVIDER NOTE - NSFOLLOWUPINSTRUCTIONS_ED_ALL_ED_FT
INDIGESTION - AfterCare(R) Instructions(ER/ED)           Indigestion    WHAT YOU NEED TO KNOW:    Indigestion, or dyspepsia, is stomach discomfort, feeling full quickly, or pain or burning in your esophagus or stomach. The cause may not be known.     DISCHARGE INSTRUCTIONS:    Return to the emergency department if:   •You have trouble swallowing.      •You have severe abdominal pain that does not go away even after you take pain medicine.      •Your bowel movement is black or you vomit blood.      •You have severe nausea or vomiting.      •You feel a mass or lump in your abdomen.      Contact your healthcare provider if:   •You have pain, discomfort, or constipation.       •You have moderate nausea with vomiting and bloating.      •Your skin looks pale, and you feel weaker and more tired than usual.       •You have questions or concerns about your condition or care.      Medicines:   •Medicines may be given to help decrease the amount of acid in your stomach.       •Take your medicine as directed. Contact your healthcare provider if you think your medicine is not helping or if you have side effects. Tell him of her if you are allergic to any medicine. Keep a list of the medicines, vitamins, and herbs you take. Include the amounts, and when and why you take them. Bring the list or the pill bottles to follow-up visits. Carry your medicine list with you in case of an emergency.      Manage your symptoms:   •Do not eat foods that can irritate your stomach, such as spicy or fatty foods. Do not have drinks that contain caffeine or alcohol. Chocolate, peppermint, spearmint, and citrus may also make your symptoms worse. Eat small meals several times a day instead of large meals.       •Limit medicines that irritate your stomach, such as NSAIDs, steroids, or narcotics. Your healthcare provider may suggest another medicine that is less irritating. Ask your healthcare provider before you take any over-the-counter medicine.      •Find ways to decrease stress. Learn new ways to relax, such as exercise, deep breathing, meditation, or listening to music.       •Do not smoke. Nicotine and other chemicals in cigarettes and cigars can cause indigestion. Ask your healthcare provider for information if you currently smoke and need help to quit. E-cigarettes or smokeless tobacco still contain nicotine. Talk to your healthcare provider before you use these products.       Follow up with your healthcare provider as directed: You may be referred to a gastroenterologist. Write down your questions so you remember to ask them during your visits.        © Copyright [employer] 2020           back to top                          © Copyright [employer] 2020

## 2020-10-27 NOTE — ED PROVIDER NOTE - PATIENT PORTAL LINK FT
You can access the FollowMyHealth Patient Portal offered by [facility] by registering at the following website: [email or web address]. By joining [employer]’s FollowMyHealth portal, you will also be able to view your health information using other applications (apps) compatible with our system.

## 2020-10-27 NOTE — ED PROVIDER NOTE - CHPI ED SYMPTOMS NEG
no vomiting/no burning urination/no fever/no blood in stool/no chills/no abdominal distension/no hematuria/no dysuria

## 2020-10-27 NOTE — ED PROVIDER NOTE - CLINICAL SUMMARY MEDICAL DECISION MAKING FREE TEXT BOX
Patient well appearing, NAD and VSS. Recommend to f/u with GI. Diet modification and continue Nexium

## 2020-10-27 NOTE — ED PROVIDER NOTE - OBJECTIVE STATEMENT
46 y/o f with h/o pre diabetic on metformin , reflux on Nexium and headache present to ED c/o last few days of feeling weak, dyspepsia, nausea , few episode of diarrhea and tremors. She state she normally get tremors when he stomach is not feeling better. Denies fever, URI, covid, sob, v, chest pain, dysuria , frequency or bloody stools. Reports of eating better and keeping away from smoking and alcohol.

## 2020-10-27 NOTE — ED PROVIDER NOTE - CARE PLAN
Principal Discharge DX:	Dyspepsia  Secondary Diagnosis:	Anxiety   Principal Discharge DX:	Abdominal pain  Secondary Diagnosis:	Anxiety

## 2020-10-30 LAB
-  AMPICILLIN: SIGNIFICANT CHANGE UP
-  CLINDAMYCIN: SIGNIFICANT CHANGE UP
-  ERYTHROMYCIN: SIGNIFICANT CHANGE UP
-  LEVOFLOXACIN: SIGNIFICANT CHANGE UP
-  PENICILLIN: SIGNIFICANT CHANGE UP
-  VANCOMYCIN: SIGNIFICANT CHANGE UP
CULTURE RESULTS: SIGNIFICANT CHANGE UP
METHOD TYPE: SIGNIFICANT CHANGE UP
ORGANISM # SPEC MICROSCOPIC CNT: SIGNIFICANT CHANGE UP
ORGANISM # SPEC MICROSCOPIC CNT: SIGNIFICANT CHANGE UP
SPECIMEN SOURCE: SIGNIFICANT CHANGE UP

## 2020-12-07 NOTE — ED PROVIDER NOTE - NSTIMEPROVIDERCAREINITIATE_GEN_ER
SUBJECTIVE:   CC: Sarah Padilla is an 50 year old woman who presents for preventive health visit.       Patient has been advised of split billing requirements and indicates understanding: Yes  Healthy Habits:     Getting at least 3 servings of Calcium per day:  Yes    Bi-annual eye exam:  NO    Dental care twice a year:  Yes    Sleep apnea or symptoms of sleep apnea:  None    Diet:  Regular (no restrictions)    Frequency of exercise:  4-5 days/week    Duration of exercise:  30-45 minutes    Taking medications regularly:  Yes    Medication side effects:  Not applicable    PHQ-2 Total Score: 0    Additional concerns today:  Yes        Today's PHQ-2 Score:   PHQ-2 ( 1999 Pf[employer]) 12/7/2020   Q1: Little interest or pleasure in doing things 0   Q2: Feeling down, depressed or hopeless 0   PHQ-2 Score 0   Q1: Little interest or pleasure in doing things Not at all   Q2: Feeling down, depressed or hopeless Not at all   PHQ-2 Score 0       Abuse: Current or Past (Physical, Sexual or Emotional) - No  Do you feel safe in your environment? Yes        Social History     Tobacco Use     Smoking status: Never Smoker     Smokeless tobacco: Never Used   Substance Use Topics     Alcohol use: Yes     Comment: occasionally; socially     If you drink alcohol do you typically have >3 drinks per day or >7 drinks per week? No    Alcohol Use 12/7/2020   Prescreen: >3 drinks/day or >7 drinks/week? No   Prescreen: >3 drinks/day or >7 drinks/week? -       Reviewed orders with patient.  Reviewed health maintenance and updated orders accordingly - Yes  BP Readings from Last 3 Encounters:   12/07/20 120/84   01/03/20 122/70   12/31/19 110/45    Wt Readings from Last 3 Encounters:   12/07/20 54.4 kg (120 lb)   02/11/20 59 kg (130 lb)   01/03/20 59 kg (130 lb)                  Patient Active Problem List   Diagnosis     CARDIOVASCULAR SCREENING; LDL GOAL LESS THAN 160     Inflammatory arthritis     Other iron deficiency anemia     Migraine without  30-Jul-2018 01:16 aura and without status migrainosus, not intractable     Daily headache     Bunion     Past Surgical History:   Procedure Laterality Date     BUNIONECTOMY Right 12/31/2019    Procedure: BUNIONECTOMY Right;  Surgeon: Juan J Bustillos DPM;  Location: [facility] OR     No History of Surgery         Social History     Tobacco Use     Smoking status: Never Smoker     Smokeless tobacco: Never Used   Substance Use Topics     Alcohol use: Yes     Comment: occasionally; socially     Family History   Problem Relation Age of Onset     Alzheimer Disease Father      Hypertension Father      Respiratory Maternal Grandmother         Emphysema     Cancer Maternal Grandfather         Lung/Skin         Current Outpatient Medications   Medication Sig Dispense Refill     Doxylamine Succinate, Sleep, (UNISOM PO) Take 1 tablet by mouth nightly as needed.       ferrous sulfate (IRON) 325 (65 FE) MG tablet Take 1 tablet (325 mg) by mouth 2 times daily 180 tablet 3     ibuprofen (IBU-200) 200 MG tablet Take 200 mg by mouth every 4 hours as needed.       rizatriptan (MAXALT-MLT) 10 MG ODT Take 1 tablet (10 mg) by mouth at onset of headache for migraine May repeat in 2 hours. Max 3 tablets/24 hours. 12 tablet 11     No Known Allergies  Recent Labs   Lab Test 07/15/17  0957 04/25/16  1744 04/17/16  0922 02/17/14  0945   LDL 49  --  58 45   HDL 74  --  72 61   TRIG 44  --  47 42   CR  --   --   --  0.77   GFRESTIMATED  --   --   --  82   GFRESTBLACK  --   --   --  >90   POTASSIUM  --   --   --  4.2   TSH  --  1.90  --   --         Mammogram Screening: Patient over age 50, mutual decision to screen reflected in health maintenance.    Pertinent mammograms are reviewed under the imaging tab.  History of abnormal Pap smear:   NO - age 30-65 PAP every 5 years with negative HPV co-testing recommended  Last 3 Pap and HPV Results:   PAP / HPV Latest Ref Rng & Units 11/12/2018 4/25/2016 3/29/2011   PAP - NIL NIL NIL   HPV 16 DNA NEG:Negative Negative - -   HPV  18 DNA NEG:Negative Negative - -   OTHER HR HPV NEG:Negative Negative - -     PAP / HPV Latest Ref Rng & Units 11/12/2018 4/25/2016 3/29/2011   PAP - NIL NIL NIL   HPV 16 DNA NEG:Negative Negative - -   HPV 18 DNA NEG:Negative Negative - -   OTHER HR HPV NEG:Negative Negative - -     Reviewed and updated as needed this visit by clinical staff  Tobacco  Allergies  Meds   Med Hx  Surg Hx  Fam Hx  Soc Hx        Reviewed and updated as needed this visit by Provider                Past Medical History:   Diagnosis Date     Daily headache 10/2/2016     Daily headache      Daily headache      Inflammatory arthritis 4/4/2012     NO ACTIVE PROBLEMS       Past Surgical History:   Procedure Laterality Date     BUNIONECTOMY Right 12/31/2019    Procedure: BUNIONECTOMY Right;  Surgeon: Juan J Bustillos DPM;  Location: [facility] OR     No History of Surgery         Review of Systems   Constitutional: Negative for chills and fever.   HENT: Negative for congestion, ear pain, hearing loss and sore throat.    Eyes: Negative for pain and visual disturbance.   Respiratory: Negative for cough and shortness of breath.    Cardiovascular: Negative for chest pain, palpitations and peripheral edema.   Gastrointestinal: Negative for abdominal pain, constipation, diarrhea, heartburn, hematochezia and nausea.   Breasts:  Negative for tenderness, breast mass and discharge.   Genitourinary: Negative for dysuria, frequency, genital sores, hematuria, pelvic pain, urgency, vaginal bleeding and vaginal discharge.   Musculoskeletal: Negative for arthralgias, joint swelling and myalgias.   Skin: Negative for rash.   Neurological: Negative for dizziness, weakness, headaches and paresthesias.   Psychiatric/Behavioral: Negative for mood changes. The patient is not nervous/anxious.      She is having heaving bleeding every other week. She takes an iron supplement. The bleeding started last year. She is not having other perimenopausal symptoms.     "  OBJECTIVE:   /84   Pulse 68   Temp 96.5  F (35.8  C) (Tympanic)   Ht 1.6 m (5' 3\")   Wt 54.4 kg (120 lb)   LMP 11/23/2020   SpO2 100%   BMI 21.26 kg/m    Physical Exam  GENERAL: healthy, alert and no distress  EYES: Eyes grossly normal to inspection, PERRL and conjunctivae and sclerae normal  HENT: ear canals and TM's normal, nose and mouth without ulcers or lesions  NECK: no adenopathy, no asymmetry, masses, or scars and thyroid normal to palpation  RESP: lungs clear to auscultation - no rales, rhonchi or wheezes  BREAST: normal without masses, tenderness or nipple discharge and no palpable axillary masses or adenopathy  CV: regular rate and rhythm, normal S1 S2, no S3 or S4, no murmur, click or rub, no peripheral edema and peripheral pulses strong  ABDOMEN: soft, nontender, no hepatosplenomegaly, no masses and bowel sounds normal  MS: no gross musculoskeletal defects noted, no edema  SKIN: no suspicious lesions or rashes  NEURO: Normal strength and tone, mentation intact and speech normal  PSYCH: mentation appears normal, affect normal/bright    Diagnostic Test Results:  Labs reviewed in Epic    ASSESSMENT/PLAN:   1. Routine general medical examination at a health care facility  Health maintenance reviewed and updated.  She declines a flu vaccine  cologuard testing ordered.     2. Migraine without aura and without status migrainosus, not intractable  Stable refills given  - rizatriptan (MAXALT-MLT) 10 MG ODT; Take 1 tablet (10 mg) by mouth at onset of headache for migraine May repeat in 2 hours. Max 3 tablets/24 hours.  Dispense: 12 tablet; Refill: 11      3. DUB (dysfunctional uterine bleeding)  She will get an ultrasound completed.   Discuss IUD for management. She had considered an endometrial ablation, but no longer considering. She has not had an ultrasound in years, with the bleeding occurring every other week for 12 months, she will have this done prior to placing an IUD.   - US Pelvic " "Complete with Transvaginal; Future    4. Screen for colon cancer  Planning for cologuard.       Patient has been advised of split billing requirements and indicates understanding: Yes  COUNSELING:  Reviewed preventive health counseling, as reflected in patient instructions       Regular exercise       Healthy diet/nutrition    Estimated body mass index is 21.26 kg/m  as calculated from the following:    Height as of this encounter: 1.6 m (5' 3\").    Weight as of this encounter: 54.4 kg (120 lb).        She reports that she has never smoked. She has never used smokeless tobacco.      Counseling Resources:  ATP IV Guidelines  Pooled Cohorts Equation Calculator  Breast Cancer Risk Calculator  BRCA-Related Cancer Risk Assessment: FHS-7 Tool  FRAX Risk Assessment  ICSI Preventive Guidelines  Dietary Guidelines for Americans, 2010  USDA's MyPlate  ASA Prophylaxis  Lung CA Screening    Kristen M. Kehr, PA-C [facility]  "

## 2020-12-21 PROBLEM — J06.9 VIRAL UPPER RESPIRATORY TRACT INFECTION: Status: RESOLVED | Noted: 2019-09-05 | Resolved: 2020-12-21

## 2020-12-23 PROBLEM — Z01.419 ENCOUNTER FOR ANNUAL ROUTINE GYNECOLOGICAL EXAMINATION: Status: RESOLVED | Noted: 2020-02-25 | Resolved: 2020-12-23

## 2021-04-24 ENCOUNTER — EMERGENCY (EMERGENCY)
Facility: HOSPITAL | Age: 46
LOS: 1 days | Discharge: HOME | End: 2021-04-24
Attending: EMERGENCY MEDICINE | Admitting: EMERGENCY MEDICINE
Payer: MEDICAID

## 2021-04-24 VITALS
SYSTOLIC BLOOD PRESSURE: 150 MMHG | TEMPERATURE: 99 F | HEIGHT: 65 IN | OXYGEN SATURATION: 97 % | HEART RATE: 104 BPM | RESPIRATION RATE: 18 BRPM | WEIGHT: 253.09 LBS | DIASTOLIC BLOOD PRESSURE: 88 MMHG

## 2021-04-24 DIAGNOSIS — Z79.899 OTHER LONG TERM (CURRENT) DRUG THERAPY: ICD-10-CM

## 2021-04-24 DIAGNOSIS — R51.9 HEADACHE, UNSPECIFIED: ICD-10-CM

## 2021-04-24 DIAGNOSIS — K04.7 PERIAPICAL ABSCESS WITHOUT SINUS: ICD-10-CM

## 2021-04-24 DIAGNOSIS — Z79.84 LONG TERM (CURRENT) USE OF ORAL HYPOGLYCEMIC DRUGS: ICD-10-CM

## 2021-04-24 DIAGNOSIS — Z90.49 ACQUIRED ABSENCE OF OTHER SPECIFIED PARTS OF DIGESTIVE TRACT: ICD-10-CM

## 2021-04-24 DIAGNOSIS — E11.65 TYPE 2 DIABETES MELLITUS WITH HYPERGLYCEMIA: ICD-10-CM

## 2021-04-24 DIAGNOSIS — K21.9 GASTRO-ESOPHAGEAL REFLUX DISEASE WITHOUT ESOPHAGITIS: ICD-10-CM

## 2021-04-24 DIAGNOSIS — Z88.0 ALLERGY STATUS TO PENICILLIN: ICD-10-CM

## 2021-04-24 DIAGNOSIS — Z79.2 LONG TERM (CURRENT) USE OF ANTIBIOTICS: ICD-10-CM

## 2021-04-24 DIAGNOSIS — Z79.1 LONG TERM (CURRENT) USE OF NON-STEROIDAL ANTI-INFLAMMATORIES (NSAID): ICD-10-CM

## 2021-04-24 DIAGNOSIS — K06.8 OTHER SPECIFIED DISORDERS OF GINGIVA AND EDENTULOUS ALVEOLAR RIDGE: ICD-10-CM

## 2021-04-24 DIAGNOSIS — Z90.49 ACQUIRED ABSENCE OF OTHER SPECIFIED PARTS OF DIGESTIVE TRACT: Chronic | ICD-10-CM

## 2021-04-24 DIAGNOSIS — Z91.048 OTHER NONMEDICINAL SUBSTANCE ALLERGY STATUS: ICD-10-CM

## 2021-04-24 LAB
ALBUMIN SERPL ELPH-MCNC: 4.1 G/DL — SIGNIFICANT CHANGE UP (ref 3.3–5)
ALP SERPL-CCNC: 101 U/L — SIGNIFICANT CHANGE UP (ref 40–120)
ALT FLD-CCNC: 19 U/L — SIGNIFICANT CHANGE UP (ref 10–45)
ANION GAP SERPL CALC-SCNC: 11 MMOL/L — SIGNIFICANT CHANGE UP (ref 5–17)
AST SERPL-CCNC: 15 U/L — SIGNIFICANT CHANGE UP (ref 10–40)
BASOPHILS # BLD AUTO: 0.01 K/UL — SIGNIFICANT CHANGE UP (ref 0–0.2)
BASOPHILS NFR BLD AUTO: 0.1 % — SIGNIFICANT CHANGE UP (ref 0–2)
BILIRUB SERPL-MCNC: 0.2 MG/DL — SIGNIFICANT CHANGE UP (ref 0.2–1.2)
BUN SERPL-MCNC: 7 MG/DL — SIGNIFICANT CHANGE UP (ref 7–23)
CALCIUM SERPL-MCNC: 9.5 MG/DL — SIGNIFICANT CHANGE UP (ref 8.4–10.5)
CHLORIDE SERPL-SCNC: 96 MMOL/L — SIGNIFICANT CHANGE UP (ref 96–108)
CO2 SERPL-SCNC: 29 MMOL/L — SIGNIFICANT CHANGE UP (ref 22–31)
CREAT SERPL-MCNC: 0.62 MG/DL — SIGNIFICANT CHANGE UP (ref 0.5–1.3)
EOSINOPHIL # BLD AUTO: 0.13 K/UL — SIGNIFICANT CHANGE UP (ref 0–0.5)
EOSINOPHIL NFR BLD AUTO: 1.6 % — SIGNIFICANT CHANGE UP (ref 0–6)
GLUCOSE SERPL-MCNC: 318 MG/DL — HIGH (ref 70–99)
HCT VFR BLD CALC: 38.1 % — SIGNIFICANT CHANGE UP (ref 34.5–45)
HGB BLD-MCNC: 12.2 G/DL — SIGNIFICANT CHANGE UP (ref 11.5–15.5)
IMM GRANULOCYTES NFR BLD AUTO: 0.3 % — SIGNIFICANT CHANGE UP (ref 0–1.5)
LYMPHOCYTES # BLD AUTO: 3.23 K/UL — SIGNIFICANT CHANGE UP (ref 1–3.3)
LYMPHOCYTES # BLD AUTO: 40.5 % — SIGNIFICANT CHANGE UP (ref 13–44)
MCHC RBC-ENTMCNC: 27.7 PG — SIGNIFICANT CHANGE UP (ref 27–34)
MCHC RBC-ENTMCNC: 32 GM/DL — SIGNIFICANT CHANGE UP (ref 32–36)
MCV RBC AUTO: 86.6 FL — SIGNIFICANT CHANGE UP (ref 80–100)
MONOCYTES # BLD AUTO: 0.65 K/UL — SIGNIFICANT CHANGE UP (ref 0–0.9)
MONOCYTES NFR BLD AUTO: 8.2 % — SIGNIFICANT CHANGE UP (ref 2–14)
NEUTROPHILS # BLD AUTO: 3.93 K/UL — SIGNIFICANT CHANGE UP (ref 1.8–7.4)
NEUTROPHILS NFR BLD AUTO: 49.3 % — SIGNIFICANT CHANGE UP (ref 43–77)
NRBC # BLD: 0 /100 WBCS — SIGNIFICANT CHANGE UP (ref 0–0)
PLATELET # BLD AUTO: 257 K/UL — SIGNIFICANT CHANGE UP (ref 150–400)
POTASSIUM SERPL-MCNC: 3.9 MMOL/L — SIGNIFICANT CHANGE UP (ref 3.5–5.3)
POTASSIUM SERPL-SCNC: 3.9 MMOL/L — SIGNIFICANT CHANGE UP (ref 3.5–5.3)
PROT SERPL-MCNC: 8.1 G/DL — SIGNIFICANT CHANGE UP (ref 6–8.3)
RBC # BLD: 4.4 M/UL — SIGNIFICANT CHANGE UP (ref 3.8–5.2)
RBC # FLD: 12.8 % — SIGNIFICANT CHANGE UP (ref 10.3–14.5)
SODIUM SERPL-SCNC: 136 MMOL/L — SIGNIFICANT CHANGE UP (ref 135–145)
WBC # BLD: 7.97 K/UL — SIGNIFICANT CHANGE UP (ref 3.8–10.5)
WBC # FLD AUTO: 7.97 K/UL — SIGNIFICANT CHANGE UP (ref 3.8–10.5)

## 2021-04-24 PROCEDURE — G1004: CPT

## 2021-04-24 PROCEDURE — 99285 EMERGENCY DEPT VISIT HI MDM: CPT

## 2021-04-24 PROCEDURE — 36415 COLL VENOUS BLD VENIPUNCTURE: CPT

## 2021-04-24 PROCEDURE — 70487 CT MAXILLOFACIAL W/DYE: CPT

## 2021-04-24 PROCEDURE — 36000 PLACE NEEDLE IN VEIN: CPT

## 2021-04-24 PROCEDURE — 85025 COMPLETE CBC W/AUTO DIFF WBC: CPT

## 2021-04-24 PROCEDURE — 80053 COMPREHEN METABOLIC PANEL: CPT

## 2021-04-24 PROCEDURE — 99284 EMERGENCY DEPT VISIT MOD MDM: CPT | Mod: 25

## 2021-04-24 PROCEDURE — 70487 CT MAXILLOFACIAL W/DYE: CPT | Mod: 26,MG

## 2021-04-24 RX ORDER — SODIUM CHLORIDE 9 MG/ML
1000 INJECTION INTRAMUSCULAR; INTRAVENOUS; SUBCUTANEOUS ONCE
Refills: 0 | Status: COMPLETED | OUTPATIENT
Start: 2021-04-24 | End: 2021-04-24

## 2021-04-24 RX ORDER — ACETAMINOPHEN 500 MG
500 TABLET ORAL ONCE
Refills: 0 | Status: COMPLETED | OUTPATIENT
Start: 2021-04-24 | End: 2021-04-24

## 2021-04-24 RX ADMIN — SODIUM CHLORIDE 1000 MILLILITER(S): 9 INJECTION INTRAMUSCULAR; INTRAVENOUS; SUBCUTANEOUS at 23:45

## 2021-04-24 RX ADMIN — Medication 500 MILLIGRAM(S): at 22:54

## 2021-04-24 RX ADMIN — Medication 500 MILLIGRAM(S): at 23:46

## 2021-04-24 NOTE — ED PROVIDER NOTE - CLINICAL SUMMARY MEDICAL DECISION MAKING FREE TEXT BOX
Impression: r facial pain x 1 month, with gingival swelling to r upper posterior gum x 2 days. Currently on abx for r lower dental infection. No appreciable swelling noted on exam with no fluctuance. WBC wnl. + mild hyperglycemia to 318 with neg ag. CT mfb reviewed w/ radiology resident; no evidence of abscess, cellulitis, mass. Pt given 1L ns bolus. ED evaluation and management discussed with the patient in detail.  Close dental/ ent follow up encouraged.  Strict ED return instructions discussed in detail and patient given the opportunity to ask any questions about their discharge diagnosis and instructions. Patient verbalized understanding.

## 2021-04-24 NOTE — ED PROVIDER NOTE - NSFOLLOWUPINSTRUCTIONS_ED_ALL_ED_FT
Continue with your antibiotics. Take tylenol as needed for pain.   Follow up with your dentist and ent for re-evaluation and further work up.  Return to er for any new or worsening symptoms (fever, chills, inability to chew/ swallow, increased facial pain/ swelling, shortness of breath...)

## 2021-04-24 NOTE — ED PROVIDER NOTE - OBJECTIVE STATEMENT
pt is a 47yo f, h/o dm, gerd, who p/w r facial pain x 1 month, predominantly to r cheek/ cheekbone, radiating to ear, eye, r side of neck. Seen by dentist recently and started on clindamycin in preparation for tooth extraction as per pt. Also noted lump to her upper gum posteriorly in the last 2 days. No f/c, pus drainage. + mild pain with chewing. No difficulty swallowing, sensation of throat closing, sob. No uri sx's/ sinus congestion. pt is a 45yo f, h/o dm, gerd, who p/w r facial pain x 1 month, ever since having dental cleaning. Pain is predominantly to r cheek/ cheekbone, radiating to ear, eye, r side of neck. Seen by dentist recently and started on clindamycin for r lower dental infection. Also told that she will need tooth extraction. Pt noted lump to her upper gum posteriorly in the last 2 days. No f/c, pus drainage. + mild pain with chewing. No difficulty swallowing, sensation of throat closing, sob. No uri sx's/ sinus congestion.

## 2021-04-24 NOTE — ED PROVIDER NOTE - CARE PROVIDER_API CALL
Sheela Saleh)  Otolaryngology  [address] [place] [address], [address] [place] [address]  Phone: [phone]  Fax: [phone]  Follow Up Time:     Ralf Mojica)  Otolaryngology  [address] [place] [address], [address] [place] [address]  Phone: [phone]  Fax: [phone]  Follow Up Time:

## 2021-04-24 NOTE — ED PROVIDER NOTE - PHYSICAL EXAMINATION
VITAL SIGNS: I have reviewed nursing notes and confirm.  CONSTITUTIONAL: Well-developed; well-nourished; in no acute distress, anxious.    SKIN:  warm and dry, no acute rash.   HEAD:  normocephalic, atraumatic.  EYES: EOM intact; conjunctiva and sclera clear.  ENT: TMs clear with good light reflex. No erythema/ edema of ear canal. No mastoid tenderness. No pain w/ manipulation of tragus/ pinna. No nasal discharge; airway clear. No trismus. Dentition intact. No gingival swelling. + mild ttp near parotid region, though no appreciable swelling. No erythema/ warmth.   NECK: Supple; non tender.  NEURO: Alert, oriented, grossly unremarkable  PSYCH: Cooperative, mood and affect appropriate.

## 2021-04-24 NOTE — ED PROVIDER NOTE - INTERNATIONAL TRAVEL
Received report from Tele7 RN.   Pt was discussed to be hypotensive in ED and lethargic.   Pt was more alert once on unit. Was told pt ambulated x1 to the bathroom.   Upon arrival into the room pt appeared obtunded. Low sats. Started on oxy mask. RRT called.   RRT and MD in room.   No

## 2021-04-24 NOTE — ED ADULT TRIAGE NOTE - PRO INTERPRETER NEED 2
Deep vein thrombosis (DVT) of both lower extremities, unspecified chronicity, unspecified vein English

## 2021-04-24 NOTE — ED PROVIDER NOTE - PATIENT PORTAL LINK FT
You can access the FollowMyHealth Patient Portal offered by [facility] by registering at the following website: [email or web address]. By joining [employer]’s FollowMyHealth portal, you will also be able to view your health information using other applications (apps) compatible with our system.

## 2021-04-24 NOTE — ED ADULT NURSE NOTE - OBJECTIVE STATEMENT
Patient arrives ambulatory c/o right sided facial pain, 1st noted a "lump" 2 days ago.  Has R lower dental pain, taking clindamycin prescribed by dentist.  Reports taking Tylenol for pain with temporary relief.

## 2021-04-24 NOTE — ED ADULT TRIAGE NOTE - OTHER COMPLAINTS
Pt complains of right face pain X2 days. pt states she has been taking clindamycin for a tooth infection since friday.

## 2021-04-24 NOTE — ED PROVIDER NOTE - CARE PROVIDERS DIRECT ADDRESSES
[email or web address]

## 2021-04-25 ENCOUNTER — EMERGENCY (EMERGENCY)
Facility: HOSPITAL | Age: 46
LOS: 1 days | Discharge: HOME | End: 2021-04-25
Attending: EMERGENCY MEDICINE | Admitting: EMERGENCY MEDICINE
Payer: MEDICAID

## 2021-04-25 VITALS
TEMPERATURE: 98 F | RESPIRATION RATE: 16 BRPM | HEART RATE: 90 BPM | SYSTOLIC BLOOD PRESSURE: 143 MMHG | DIASTOLIC BLOOD PRESSURE: 93 MMHG | OXYGEN SATURATION: 98 % | HEIGHT: 65 IN | WEIGHT: 253.09 LBS

## 2021-04-25 VITALS
RESPIRATION RATE: 18 BRPM | SYSTOLIC BLOOD PRESSURE: 135 MMHG | OXYGEN SATURATION: 98 % | TEMPERATURE: 98 F | HEART RATE: 84 BPM | DIASTOLIC BLOOD PRESSURE: 84 MMHG

## 2021-04-25 DIAGNOSIS — A04.8 OTHER SPECIFIED BACTERIAL INTESTINAL INFECTIONS: ICD-10-CM

## 2021-04-25 DIAGNOSIS — Z88.0 ALLERGY STATUS TO PENICILLIN: ICD-10-CM

## 2021-04-25 DIAGNOSIS — Z90.49 ACQUIRED ABSENCE OF OTHER SPECIFIED PARTS OF DIGESTIVE TRACT: Chronic | ICD-10-CM

## 2021-04-25 DIAGNOSIS — Z90.49 ACQUIRED ABSENCE OF OTHER SPECIFIED PARTS OF DIGESTIVE TRACT: ICD-10-CM

## 2021-04-25 DIAGNOSIS — R53.1 WEAKNESS: ICD-10-CM

## 2021-04-25 DIAGNOSIS — E11.65 TYPE 2 DIABETES MELLITUS WITH HYPERGLYCEMIA: ICD-10-CM

## 2021-04-25 DIAGNOSIS — G43.909 MIGRAINE, UNSPECIFIED, NOT INTRACTABLE, WITHOUT STATUS MIGRAINOSUS: ICD-10-CM

## 2021-04-25 DIAGNOSIS — Z91.048 OTHER NONMEDICINAL SUBSTANCE ALLERGY STATUS: ICD-10-CM

## 2021-04-25 PROCEDURE — 82962 GLUCOSE BLOOD TEST: CPT

## 2021-04-25 PROCEDURE — 99284 EMERGENCY DEPT VISIT MOD MDM: CPT

## 2021-04-25 PROCEDURE — 99283 EMERGENCY DEPT VISIT LOW MDM: CPT

## 2021-04-25 RX ORDER — SITAGLIPTIN 50 MG/1
2 TABLET, FILM COATED ORAL
Qty: 2 | Refills: 0
Start: 2021-04-25

## 2021-04-25 RX ORDER — INSULIN HUMAN 100 [IU]/ML
6 INJECTION, SOLUTION SUBCUTANEOUS ONCE
Refills: 0 | Status: COMPLETED | OUTPATIENT
Start: 2021-04-25 | End: 2021-04-25

## 2021-04-25 RX ORDER — SITAGLIPTIN 50 MG/1
2 TABLET, FILM COATED ORAL
Qty: 4 | Refills: 0
Start: 2021-04-25 | End: 2021-04-26

## 2021-04-25 RX ADMIN — INSULIN HUMAN 6 UNIT(S): 100 INJECTION, SOLUTION SUBCUTANEOUS at 06:02

## 2021-04-25 NOTE — ED ADULT NURSE NOTE - NSIMPLEMENTINTERV_GEN_ALL_ED
Implemented All Universal Safety Interventions:  [place] to call system. Call bell, personal items and telephone within reach. Instruct patient to call for assistance. Room bathroom lighting operational. Non-slip footwear when patient is off stretcher. Physically safe environment: no spills, clutter or unnecessary equipment. Stretcher in lowest position, wheels locked, appropriate side rails in place.

## 2021-04-25 NOTE — ED PROVIDER NOTE - CLINICAL SUMMARY MEDICAL DECISION MAKING FREE TEXT BOX
here w/ hyperglycemia. dicussed with pharmacy, no clear regulations exist for this situatino. irina have pt restart her sitagliptin instead, and insulin given in the ED to bring down her sugar

## 2021-04-25 NOTE — ED PROVIDER NOTE - NSFOLLOWUPINSTRUCTIONS_ED_ALL_ED_FT
Please take januvia today and tomorrow. Restart the Metformin on Tuesday.     Hyperglycemia    Hyperglycemia occurs when the glucose (sugar) level in your blood is too high. Symptoms include increased urination, increased thirst, a dry mouth, or changes in appetite. If started on a medication, take exactly as prescribed by your health care professional. Maintain a healthy lifestyle and follow up with your primary care physician.    SEEK IMMEDIATE MEDICAL CARE IF YOU HAVE ANY OF THE FOLLOWING SYMPTOMS: shortness of breath, change in mental status, nausea or vomiting, fruity smell to your breath, or any signs of dehydration.

## 2021-04-25 NOTE — ED ADULT NURSE REASSESSMENT NOTE - NS ED NURSE REASSESS COMMENT FT1
Patient verbalized understanding to hold metformin for 48 hrs.  Handoff given to RN KENTON Findlator - IV fluids infusing, plan for fingerstick after completion.

## 2021-04-25 NOTE — ED PROVIDER NOTE - OBJECTIVE STATEMENT
47yo F just dc from ED and s/p IV contrast, told to hold metformin x 2 days, but pt has been non compliant for several days, now sugar is >350s at home, unsure what to do, so came back to discuss. no symptoms at this time. has taken other meds in the past and tolerated well.

## 2021-04-25 NOTE — ED PROVIDER NOTE - PATIENT PORTAL LINK FT
You can access the FollowMyHealth Patient Portal offered by [facility] by registering at the following website: [email or web address]. By joining [employer]’s FollowMyHealth portal, you will also be able to view your health information using other applications (apps) compatible with our system.

## 2021-04-28 ENCOUNTER — APPOINTMENT (OUTPATIENT)
Dept: OBGYN | Facility: CLINIC | Age: 46
End: 2021-04-28
Payer: MEDICAID

## 2021-04-28 VITALS
SYSTOLIC BLOOD PRESSURE: 138 MMHG | BODY MASS INDEX: 42.85 KG/M2 | HEIGHT: 64 IN | DIASTOLIC BLOOD PRESSURE: 80 MMHG | WEIGHT: 251 LBS

## 2021-04-28 DIAGNOSIS — B37.9 CANDIDIASIS, UNSPECIFIED: ICD-10-CM

## 2021-04-28 DIAGNOSIS — T36.95XA CANDIDIASIS, UNSPECIFIED: ICD-10-CM

## 2021-04-28 PROCEDURE — 99072 ADDL SUPL MATRL&STAF TM PHE: CPT

## 2021-04-28 PROCEDURE — 99396 PREV VISIT EST AGE 40-64: CPT

## 2021-04-30 LAB — HPV HIGH+LOW RISK DNA PNL CVX: NOT DETECTED

## 2021-05-01 ENCOUNTER — OUTPATIENT (OUTPATIENT)
Dept: OUTPATIENT SERVICES | Facility: HOSPITAL | Age: 46
LOS: 1 days | End: 2021-05-01
Payer: MEDICAID

## 2021-05-01 ENCOUNTER — RESULT REVIEW (OUTPATIENT)
Age: 46
End: 2021-05-01

## 2021-05-01 ENCOUNTER — APPOINTMENT (OUTPATIENT)
Dept: ULTRASOUND IMAGING | Facility: HOSPITAL | Age: 46
End: 2021-05-01

## 2021-05-01 DIAGNOSIS — Z90.49 ACQUIRED ABSENCE OF OTHER SPECIFIED PARTS OF DIGESTIVE TRACT: Chronic | ICD-10-CM

## 2021-05-01 PROCEDURE — 76856 US EXAM PELVIC COMPLETE: CPT | Mod: 26

## 2021-05-01 PROCEDURE — 76856 US EXAM PELVIC COMPLETE: CPT

## 2021-05-01 PROCEDURE — 76830 TRANSVAGINAL US NON-OB: CPT | Mod: 26

## 2021-05-01 PROCEDURE — 76830 TRANSVAGINAL US NON-OB: CPT

## 2021-05-09 LAB — CYTOLOGY CVX/VAG DOC THIN PREP: ABNORMAL

## 2021-05-14 NOTE — HISTORY OF PRESENT ILLNESS
[Last Pap ___] : Last cervical pap smear was [unfilled] [Last Mammogram ___] : Last Mammogram was [unfilled] [Perimenopausal] : is perimenopausal [Pregnancy History] : pregnancy history: [unknown] : the patient is unsure of the date of her LMP [Contraception] : does not use contraception [Sexually Active] : is not sexually active

## 2021-05-14 NOTE — PHYSICAL EXAM
[Awake] : awake [Alert] : alert [Soft] : soft [Oriented x3] : oriented to person, place, and time [No Bleeding] : there was no active vaginal bleeding [Pap Obtained] : a Pap smear was performed [Normal] : uterus [Uterine Adnexae] : were not tender and not enlarged [Acute Distress] : no acute distress [Mass] : no breast mass [Nipple Discharge] : no nipple discharge [Axillary LAD] : no axillary lymphadenopathy [Tender] : non tender

## 2021-05-31 ENCOUNTER — EMERGENCY (EMERGENCY)
Facility: HOSPITAL | Age: 46
LOS: 1 days | Discharge: HOME | End: 2021-05-31
Attending: EMERGENCY MEDICINE | Admitting: EMERGENCY MEDICINE
Payer: MEDICAID

## 2021-05-31 VITALS
RESPIRATION RATE: 18 BRPM | WEIGHT: 250 LBS | HEART RATE: 84 BPM | SYSTOLIC BLOOD PRESSURE: 154 MMHG | DIASTOLIC BLOOD PRESSURE: 96 MMHG | OXYGEN SATURATION: 98 % | TEMPERATURE: 98 F | HEIGHT: 65 IN

## 2021-05-31 VITALS
RESPIRATION RATE: 18 BRPM | TEMPERATURE: 98 F | SYSTOLIC BLOOD PRESSURE: 143 MMHG | DIASTOLIC BLOOD PRESSURE: 95 MMHG | OXYGEN SATURATION: 99 % | HEART RATE: 82 BPM

## 2021-05-31 DIAGNOSIS — R53.83 OTHER FATIGUE: ICD-10-CM

## 2021-05-31 DIAGNOSIS — R06.02 SHORTNESS OF BREATH: ICD-10-CM

## 2021-05-31 DIAGNOSIS — Z91.048 OTHER NONMEDICINAL SUBSTANCE ALLERGY STATUS: ICD-10-CM

## 2021-05-31 DIAGNOSIS — K21.9 GASTRO-ESOPHAGEAL REFLUX DISEASE WITHOUT ESOPHAGITIS: ICD-10-CM

## 2021-05-31 DIAGNOSIS — E11.65 TYPE 2 DIABETES MELLITUS WITH HYPERGLYCEMIA: ICD-10-CM

## 2021-05-31 DIAGNOSIS — R07.89 OTHER CHEST PAIN: ICD-10-CM

## 2021-05-31 DIAGNOSIS — R53.81 OTHER MALAISE: ICD-10-CM

## 2021-05-31 DIAGNOSIS — J45.909 UNSPECIFIED ASTHMA, UNCOMPLICATED: ICD-10-CM

## 2021-05-31 DIAGNOSIS — M54.9 DORSALGIA, UNSPECIFIED: ICD-10-CM

## 2021-05-31 DIAGNOSIS — Z20.822 CONTACT WITH AND (SUSPECTED) EXPOSURE TO COVID-19: ICD-10-CM

## 2021-05-31 DIAGNOSIS — Z88.0 ALLERGY STATUS TO PENICILLIN: ICD-10-CM

## 2021-05-31 DIAGNOSIS — Z79.84 LONG TERM (CURRENT) USE OF ORAL HYPOGLYCEMIC DRUGS: ICD-10-CM

## 2021-05-31 DIAGNOSIS — R05 COUGH: ICD-10-CM

## 2021-05-31 DIAGNOSIS — Z90.49 ACQUIRED ABSENCE OF OTHER SPECIFIED PARTS OF DIGESTIVE TRACT: Chronic | ICD-10-CM

## 2021-05-31 LAB
ALBUMIN SERPL ELPH-MCNC: 3.9 G/DL — SIGNIFICANT CHANGE UP (ref 3.3–5)
ALP SERPL-CCNC: 90 U/L — SIGNIFICANT CHANGE UP (ref 40–120)
ALT FLD-CCNC: 21 U/L — SIGNIFICANT CHANGE UP (ref 10–45)
ANION GAP SERPL CALC-SCNC: 11 MMOL/L — SIGNIFICANT CHANGE UP (ref 5–17)
AST SERPL-CCNC: 15 U/L — SIGNIFICANT CHANGE UP (ref 10–40)
BASOPHILS # BLD AUTO: 0.01 K/UL — SIGNIFICANT CHANGE UP (ref 0–0.2)
BASOPHILS NFR BLD AUTO: 0.2 % — SIGNIFICANT CHANGE UP (ref 0–2)
BILIRUB SERPL-MCNC: 0.2 MG/DL — SIGNIFICANT CHANGE UP (ref 0.2–1.2)
BUN SERPL-MCNC: 7 MG/DL — SIGNIFICANT CHANGE UP (ref 7–23)
CALCIUM SERPL-MCNC: 9.2 MG/DL — SIGNIFICANT CHANGE UP (ref 8.4–10.5)
CHLORIDE SERPL-SCNC: 99 MMOL/L — SIGNIFICANT CHANGE UP (ref 96–108)
CK MB CFR SERPL CALC: 1 NG/ML — SIGNIFICANT CHANGE UP (ref 0–6.7)
CO2 SERPL-SCNC: 27 MMOL/L — SIGNIFICANT CHANGE UP (ref 22–31)
CREAT SERPL-MCNC: 0.53 MG/DL — SIGNIFICANT CHANGE UP (ref 0.5–1.3)
EOSINOPHIL # BLD AUTO: 0.07 K/UL — SIGNIFICANT CHANGE UP (ref 0–0.5)
EOSINOPHIL NFR BLD AUTO: 1.3 % — SIGNIFICANT CHANGE UP (ref 0–6)
GLUCOSE SERPL-MCNC: 324 MG/DL — HIGH (ref 70–99)
HCT VFR BLD CALC: 39.8 % — SIGNIFICANT CHANGE UP (ref 34.5–45)
HGB BLD-MCNC: 12.7 G/DL — SIGNIFICANT CHANGE UP (ref 11.5–15.5)
IMM GRANULOCYTES NFR BLD AUTO: 0.2 % — SIGNIFICANT CHANGE UP (ref 0–1.5)
LYMPHOCYTES # BLD AUTO: 2.12 K/UL — SIGNIFICANT CHANGE UP (ref 1–3.3)
LYMPHOCYTES # BLD AUTO: 38.3 % — SIGNIFICANT CHANGE UP (ref 13–44)
MCHC RBC-ENTMCNC: 27.9 PG — SIGNIFICANT CHANGE UP (ref 27–34)
MCHC RBC-ENTMCNC: 31.9 GM/DL — LOW (ref 32–36)
MCV RBC AUTO: 87.3 FL — SIGNIFICANT CHANGE UP (ref 80–100)
MONOCYTES # BLD AUTO: 0.42 K/UL — SIGNIFICANT CHANGE UP (ref 0–0.9)
MONOCYTES NFR BLD AUTO: 7.6 % — SIGNIFICANT CHANGE UP (ref 2–14)
NEUTROPHILS # BLD AUTO: 2.91 K/UL — SIGNIFICANT CHANGE UP (ref 1.8–7.4)
NEUTROPHILS NFR BLD AUTO: 52.4 % — SIGNIFICANT CHANGE UP (ref 43–77)
NRBC # BLD: 0 /100 WBCS — SIGNIFICANT CHANGE UP (ref 0–0)
PLATELET # BLD AUTO: 249 K/UL — SIGNIFICANT CHANGE UP (ref 150–400)
POTASSIUM SERPL-MCNC: 3.9 MMOL/L — SIGNIFICANT CHANGE UP (ref 3.5–5.3)
POTASSIUM SERPL-SCNC: 3.9 MMOL/L — SIGNIFICANT CHANGE UP (ref 3.5–5.3)
PROT SERPL-MCNC: 7.8 G/DL — SIGNIFICANT CHANGE UP (ref 6–8.3)
RBC # BLD: 4.56 M/UL — SIGNIFICANT CHANGE UP (ref 3.8–5.2)
RBC # FLD: 12.9 % — SIGNIFICANT CHANGE UP (ref 10.3–14.5)
SARS-COV-2 RNA SPEC QL NAA+PROBE: SIGNIFICANT CHANGE UP
SODIUM SERPL-SCNC: 137 MMOL/L — SIGNIFICANT CHANGE UP (ref 135–145)
TROPONIN T SERPL-MCNC: 0.01 NG/ML — SIGNIFICANT CHANGE UP (ref 0–0.01)
WBC # BLD: 5.54 K/UL — SIGNIFICANT CHANGE UP (ref 3.8–10.5)
WBC # FLD AUTO: 5.54 K/UL — SIGNIFICANT CHANGE UP (ref 3.8–10.5)

## 2021-05-31 PROCEDURE — 36415 COLL VENOUS BLD VENIPUNCTURE: CPT

## 2021-05-31 PROCEDURE — 71046 X-RAY EXAM CHEST 2 VIEWS: CPT

## 2021-05-31 PROCEDURE — 80053 COMPREHEN METABOLIC PANEL: CPT

## 2021-05-31 PROCEDURE — 93010 ELECTROCARDIOGRAM REPORT: CPT

## 2021-05-31 PROCEDURE — 82553 CREATINE MB FRACTION: CPT

## 2021-05-31 PROCEDURE — U0003: CPT

## 2021-05-31 PROCEDURE — 84484 ASSAY OF TROPONIN QUANT: CPT

## 2021-05-31 PROCEDURE — 99285 EMERGENCY DEPT VISIT HI MDM: CPT

## 2021-05-31 PROCEDURE — 85025 COMPLETE CBC W/AUTO DIFF WBC: CPT

## 2021-05-31 PROCEDURE — 93005 ELECTROCARDIOGRAM TRACING: CPT

## 2021-05-31 PROCEDURE — 99284 EMERGENCY DEPT VISIT MOD MDM: CPT | Mod: 25

## 2021-05-31 PROCEDURE — 94640 AIRWAY INHALATION TREATMENT: CPT

## 2021-05-31 PROCEDURE — U0005: CPT

## 2021-05-31 PROCEDURE — 82550 ASSAY OF CK (CPK): CPT

## 2021-05-31 PROCEDURE — 71046 X-RAY EXAM CHEST 2 VIEWS: CPT | Mod: 26

## 2021-05-31 RX ORDER — IPRATROPIUM/ALBUTEROL SULFATE 18-103MCG
3 AEROSOL WITH ADAPTER (GRAM) INHALATION ONCE
Refills: 0 | Status: COMPLETED | OUTPATIENT
Start: 2021-05-31 | End: 2021-05-31

## 2021-05-31 RX ORDER — ALBUTEROL 90 UG/1
2 AEROSOL, METERED ORAL
Qty: 1 | Refills: 0
Start: 2021-05-31 | End: 2021-06-29

## 2021-05-31 RX ORDER — ACETAMINOPHEN 500 MG
975 TABLET ORAL ONCE
Refills: 0 | Status: COMPLETED | OUTPATIENT
Start: 2021-05-31 | End: 2021-05-31

## 2021-05-31 RX ORDER — METFORMIN HYDROCHLORIDE 850 MG/1
1000 TABLET ORAL ONCE
Refills: 0 | Status: COMPLETED | OUTPATIENT
Start: 2021-05-31 | End: 2021-05-31

## 2021-05-31 RX ADMIN — Medication 3 MILLILITER(S): at 13:00

## 2021-05-31 RX ADMIN — Medication 975 MILLIGRAM(S): at 13:00

## 2021-05-31 RX ADMIN — METFORMIN HYDROCHLORIDE 1000 MILLIGRAM(S): 850 TABLET ORAL at 14:26

## 2021-05-31 NOTE — ED ADULT TRIAGE NOTE - CHIEF COMPLAINT QUOTE
47 y/o female with hx of asthma, c/o SOB, chest pain radiating to back, Pt states that she did not try using her inhaler. Pt is not vaccinated for covid 19. Afebrile at triage.

## 2021-05-31 NOTE — ED PROVIDER NOTE - NSPTACCESSSVCSAPPTDETAILS_ED_ALL_ED_FT
pt requesting new pcp at [facility]. SHe has diabetes, asthma and gerd. Needs help with DM management.

## 2021-05-31 NOTE — ED ADULT NURSE NOTE - PMH
DISCHARGE INSTRUCTIONS:      1.  Weight bear as tolerated on the right leg with walker or 2 crutches until seen by Andrés Whyte MD in 2 weeks.  2.  Daily dressing changes for proximal incision until dressing is completely dry for 2 days in a row.  3.  Continue PT/OT.  4.  Take no over the counter anti inflammatory medications like Advil or ibuprofen while taking  Lovenox.  5. Take your pain medications as instructed and only as needed for pain.  6.  Follow up with Dr. Whyte as scheduled.  Contact Sandra if you have any post operative questions or concerns at [phone].  7.  Any medical questions should be directed to your Primary care Physician.    
H. pylori infection    Migraines    Type 2 diabetes mellitus without complication, without long-term current use of insulin

## 2021-05-31 NOTE — ED PROVIDER NOTE - PHYSICAL EXAMINATION
GEN: Well appearing, well nourished, awake, alert, oriented to person, place, time/situation and in no apparent distress. NTAF  ENT: Airway patent, Nasal mucosa clear. Mouth with normal mucosa.  EYES: Clear bilaterally. PERRL, EOMI  RESPIRATORY: Breathing comfortably with normal RR. No W/C/R, no hypoxia or resp distress.  CARDIAC: Regular rate and rhythm, no M/R/G  ABDOMEN: Soft, nontender, +bowel sounds, no rebound, rigidity, or guarding.  MSK: Range of motion is not limited, no deformities noted. no midline c/t/l-spine TTP. parathoracic muscle TTP, mild left rib TTP, no crepitus or flail.  NEURO: Alert and oriented x 3. Cn 2-12 intact. Strength 5/5 and sensation intact in all 4 extremities.  Gait normal.   SKIN: Skin normal color for race, warm, dry and intact. No evidence of rash.  PSYCH: Alert and oriented to person, place, time/situation. normal mood and affect. no apparent risk to self or others. GEN: Well appearing, obese, awake, alert, oriented to person, place, time/situation and in no apparent distress. NTAF  ENT: Airway patent, Nasal mucosa clear. Mouth with normal mucosa.  EYES: Clear bilaterally. PERRL, EOMI  RESPIRATORY: Breathing comfortably with normal RR. No W/C/R, no hypoxia or resp distress.  CARDIAC: Regular rate and rhythm, no M/R/G  ABDOMEN: Soft, nontender, +bowel sounds, no rebound, rigidity, or guarding.  MSK: Range of motion is not limited, no deformities noted. no midline c/t/l-spine TTP. parathoracic muscle TTP, mild left rib TTP, no crepitus or flail.  NEURO: Alert and oriented x 3. Cn 2-12 intact. Strength 5/5 and sensation intact in all 4 extremities.  Gait normal.   SKIN: Skin normal color for race, warm, dry and intact. No evidence of rash.  PSYCH: Alert and oriented to person, place, time/situation. normal mood and affect. no apparent risk to self or others.

## 2021-05-31 NOTE — ED PROVIDER NOTE - PATIENT PORTAL LINK FT
You can access the FollowMyHealth Patient Portal offered by [facility] by registering at the following website: [email or web address]. By joining [employer]’s FollowMyHealth portal, you will also be able to view your health information using other applications (apps) compatible with our system.

## 2021-05-31 NOTE — ED PROVIDER NOTE - NSFOLLOWUPINSTRUCTIONS_ED_ALL_ED_FT
Please follow up with your primary care physician. If you have any problem getting an appointment please call the ED Referral Coordinator at [phone].  Please take your diabetes medication. Please exercise and follow a diet low in carbs and sugar. Please take your pump if you feel sob or are coughing/wheezing.  You will be notified with your covid results if positive.   Return to the Emergency Department if you have any new or worsening symptoms, or for any other concerns. Please read below for further information.    Hyperglycemia    Hyperglycemia occurs when the glucose (sugar) level in your blood is too high. Symptoms include increased urination, increased thirst, a dry mouth, or changes in appetite. If started on a medication, take exactly as prescribed by your health care professional. Maintain a healthy lifestyle and follow up with your primary care physician.    SEEK IMMEDIATE MEDICAL CARE IF YOU HAVE ANY OF THE FOLLOWING SYMPTOMS: shortness of breath, change in mental status, nausea or vomiting, fruity smell to your breath, or any signs of dehydration.    Asthma    Asthma is a condition in which the airways tighten and narrow, making it difficult to breath. Asthma episodes, also called asthma attacks, range from minor to life-threatening. Symptoms include wheezing, coughing, chest tightness, or shortness of breath. The diagnosis of asthma is made by a review of your medical history and a physical exam, but may involve additional testing. Asthma cannot be cured, but medicines and lifestyle changes can help control it. Avoid triggers of asthma which may include animal dander, pollen, mold, smoke, air pollutants, etc.     SEEK IMMEDIATE MEDICAL CARE IF YOU HAVE ANY OF THE FOLLOWING SYMPTOMS: worsening of symptoms, shortness of breath at rest, chest pain, bluish discoloration to lips or fingertips, lightheadedness/dizziness, or fever.   Chest Pain    Chest pain can be caused by many different conditions which may or may not be dangerous. Causes include heartburn, lung infections, heart attack, blood clot in lungs, skin infections, strain or damage to muscle, cartilage, or bones, etc. In addition to a history and physical examination, an electrocardiogram (ECG) or other lab tests may have been performed to determine the cause of your chest pain. Follow up with your primary care provider or with a cardiologist as instructed.     SEEK IMMEDIATE MEDICAL CARE IF YOU HAVE ANY OF THE FOLLOWING SYMPTOMS: worsening chest pain, coughing up blood, unexplained back/neck/jaw pain, severe abdominal pain, dizziness or lightheadedness, fainting, shortness of breath, sweaty or clammy skin, vomiting, or racing heart beat. These symptoms may represent a serious problem that is an emergency. Do not wait to see if the symptoms will go away. Get medical help right away. Call 911 and do not drive yourself to the hospital.

## 2021-05-31 NOTE — ED ADULT NURSE NOTE - OBJECTIVE STATEMENT
Pt is a 45 y/o female A&Ox4 in NAD ambulatory with steady gait c/o chest pain radiating to back with associated SOB. Pt denies fever/chills, N/V/D. Pt endorses negative COVID test yesterday. Pt talking in clear, full sentences, respirations even and unlabored, EKG done and signed. Pt is a 47 y/o female A&Ox4 in NAD ambulatory with steady gait c/o chest pain radiating to back with associated SOB. Pt denies fever/chills, N/V/D. Pt endorses negative COVID test last Saturday. Pt talking in clear, full sentences, respirations even and unlabored, EKG done and signed.

## 2021-05-31 NOTE — ED PROVIDER NOTE - CLINICAL SUMMARY MEDICAL DECISION MAKING FREE TEXT BOX
46M with a h/o DM on metformin, GERD, asthma who p/w 2 days of bilateral back and chest pain and tightness associated with SOB, dry cough, feeling "bubbles" in her chest, and generalized malaise. Pt reports she had a colonoscopy done at [facility] last Thursday (5 days ago) and the following day she started feeling unwell. She has not been vaccinated against covid. Did not take her asthma meds pta.   Pt well-appearing on exam with stable VS, pain reproducible on exam, slight tight BS b/l, suspect msk + mild asthma flail, but will r/o acs, covid, pancreatitis. ddx gerd, neuropathy. Do not suspect PE, dissection or other life threatening etiology at this time.   EKG no stemi, TWI III nonspecific.  Plan for labs, CXR, tylenol for pain. Dispo pending workup. 46M with a h/o DM on metformin, GERD, asthma who p/w 2 days of bilateral back and chest pain and tightness associated with SOB, dry cough, feeling "bubbles" in her chest, and generalized malaise. Pt reports she had a colonoscopy done at [facility] last Thursday (5 days ago) and the following day she started feeling unwell. She has not been vaccinated against covid. Did not take her asthma meds pta.   Pt well-appearing on exam with stable VS, pain reproducible on exam, slight tight BS b/l, suspect msk + mild asthma flail, but will r/o acs, covid, pancreatitis. ddx gerd, neuropathy. Do not suspect PE, dissection or other life threatening etiology at this time.   EKG no stemi, TWI III nonspecific.  Plan for labs, CXR, tylenol for pain. Dispo pending workup.    Labs remarkable for hyperglycemia to 324. She admits she did not take her metformin today. CP and SOB feels better after the duoneb, she will be rx'd pump. She was advised to stay hydrated and take DM meds as directed. She was offered IVFs and SQ insulin here to bring down glu but she refuses, stating she just wants her metformin. Diet and exercise encouraged. COvid test pending - she was advised she will be called if positive.   The patient is stable for DC and is feeling much better.  Symptoms have improved and after discussion regarding discharge, patient feels comfortable going home.  Answers to all questions provided.  Patient feeling satisfactory with care and follow up plan discussed. They were advised to call their PMD for prompt outpatient follow up. Return precautions were discussed. The patient was advised to return to the ER for any concerning or worsening symptoms.

## 2021-05-31 NOTE — ED PROVIDER NOTE - OBJECTIVE STATEMENT
46M with a h/o DM on metformin, GERD, asthma who p/w 2 days of bilateral back and chest pain and tightness associated with SOB, dry cough, feeling "bubbles" in her chest, and generalized malaise. Pt reports she had a colonoscopy done at [facility] last Thursday (5 days ago) and the following days she started feeling unwell. She has not been vaccinated against covid. 46M with a h/o DM on metformin, GERD, asthma who p/w 2 days of bilateral back and chest pain and tightness associated with SOB, dry cough, feeling "bubbles" in her chest, and generalized malaise. Pt reports she had a colonoscopy done at [facility] last Thursday (5 days ago) and the following day she started feeling unwell. She was told she has asthma in 2019 but does take her meds or pump bc she doesn't "know what an asthma attack feels like." She has not been vaccinated against covid.  Denies hx of CAD or VTE. No f/c, no sob, no n/v, no abd pain, no leg swelling, no ha or neck pain, no dizziness or syncope, no n/t/w in ext. No other complaints. 46M with a h/o DM on metformin, GERD, asthma who p/w 2 days of bilateral back and chest pain and tightness associated with SOB, dry cough, feeling "bubbles" in her chest, and generalized malaise. Pt reports she had a colonoscopy done at [facility] last Thursday (5 days ago) and the following day she started feeling unwell. She was told she has asthma in 2019 but does take her meds or pump bc she doesn't "know what an asthma attack feels like." She has not been vaccinated against covid.   Denies hx of CAD or VTE. No f/c, no sob, no n/v, no abd pain, no leg swelling, no ha or neck pain, no dizziness or syncope, no n/t/w in ext. No other complaints.

## 2021-05-31 NOTE — ED ADULT NURSE NOTE - NS ED NURSE LEVEL OF CONSCIOUSNESS AFFECT
----- Message from Nato Messina sent at 6/6/2017 10:11 AM CDT -----  Contact: Pt   Pt is requesting to speak with the nurse in regards to getting office notes and labs faxed to her pcp../ It can be faxed to Dr. Janeen Delacruz at #[phone]/fax./ Pt can be reached at [phone]    Calm

## 2021-06-22 ENCOUNTER — APPOINTMENT (OUTPATIENT)
Dept: OBGYN | Facility: CLINIC | Age: 46
End: 2021-06-22
Payer: MEDICAID

## 2021-06-22 VITALS
BODY MASS INDEX: 42.82 KG/M2 | SYSTOLIC BLOOD PRESSURE: 130 MMHG | DIASTOLIC BLOOD PRESSURE: 80 MMHG | HEIGHT: 65 IN | WEIGHT: 257 LBS

## 2021-06-22 PROCEDURE — 99072 ADDL SUPL MATRL&STAF TM PHE: CPT

## 2021-06-22 PROCEDURE — 99212 OFFICE O/P EST SF 10 MIN: CPT

## 2021-07-08 ENCOUNTER — EMERGENCY (EMERGENCY)
Facility: HOSPITAL | Age: 46
LOS: 1 days | Discharge: HOME | End: 2021-07-08
Attending: EMERGENCY MEDICINE | Admitting: EMERGENCY MEDICINE
Payer: MEDICAID

## 2021-07-08 VITALS
RESPIRATION RATE: 16 BRPM | HEART RATE: 76 BPM | DIASTOLIC BLOOD PRESSURE: 85 MMHG | SYSTOLIC BLOOD PRESSURE: 138 MMHG | OXYGEN SATURATION: 99 % | TEMPERATURE: 98 F

## 2021-07-08 VITALS
OXYGEN SATURATION: 99 % | HEART RATE: 85 BPM | HEIGHT: 65 IN | WEIGHT: 251.99 LBS | TEMPERATURE: 99 F | SYSTOLIC BLOOD PRESSURE: 131 MMHG | RESPIRATION RATE: 16 BRPM | DIASTOLIC BLOOD PRESSURE: 91 MMHG

## 2021-07-08 DIAGNOSIS — R10.13 EPIGASTRIC PAIN: ICD-10-CM

## 2021-07-08 DIAGNOSIS — G43.909 MIGRAINE, UNSPECIFIED, NOT INTRACTABLE, WITHOUT STATUS MIGRAINOSUS: ICD-10-CM

## 2021-07-08 DIAGNOSIS — E11.9 TYPE 2 DIABETES MELLITUS WITHOUT COMPLICATIONS: ICD-10-CM

## 2021-07-08 DIAGNOSIS — Z90.49 ACQUIRED ABSENCE OF OTHER SPECIFIED PARTS OF DIGESTIVE TRACT: Chronic | ICD-10-CM

## 2021-07-08 DIAGNOSIS — R07.89 OTHER CHEST PAIN: ICD-10-CM

## 2021-07-08 DIAGNOSIS — I10 ESSENTIAL (PRIMARY) HYPERTENSION: ICD-10-CM

## 2021-07-08 DIAGNOSIS — K21.9 GASTRO-ESOPHAGEAL REFLUX DISEASE WITHOUT ESOPHAGITIS: ICD-10-CM

## 2021-07-08 DIAGNOSIS — Z91.048 OTHER NONMEDICINAL SUBSTANCE ALLERGY STATUS: ICD-10-CM

## 2021-07-08 DIAGNOSIS — Z79.4 LONG TERM (CURRENT) USE OF INSULIN: ICD-10-CM

## 2021-07-08 DIAGNOSIS — Z90.49 ACQUIRED ABSENCE OF OTHER SPECIFIED PARTS OF DIGESTIVE TRACT: ICD-10-CM

## 2021-07-08 DIAGNOSIS — Z20.822 CONTACT WITH AND (SUSPECTED) EXPOSURE TO COVID-19: ICD-10-CM

## 2021-07-08 DIAGNOSIS — Z88.0 ALLERGY STATUS TO PENICILLIN: ICD-10-CM

## 2021-07-08 LAB
ALBUMIN SERPL ELPH-MCNC: 3.9 G/DL — SIGNIFICANT CHANGE UP (ref 3.3–5)
ALP SERPL-CCNC: 85 U/L — SIGNIFICANT CHANGE UP (ref 40–120)
ALT FLD-CCNC: 24 U/L — SIGNIFICANT CHANGE UP (ref 10–45)
ANION GAP SERPL CALC-SCNC: 10 MMOL/L — SIGNIFICANT CHANGE UP (ref 5–17)
APPEARANCE UR: CLEAR — SIGNIFICANT CHANGE UP
AST SERPL-CCNC: 23 U/L — SIGNIFICANT CHANGE UP (ref 10–40)
BASOPHILS # BLD AUTO: 0.02 K/UL — SIGNIFICANT CHANGE UP (ref 0–0.2)
BASOPHILS NFR BLD AUTO: 0.3 % — SIGNIFICANT CHANGE UP (ref 0–2)
BILIRUB SERPL-MCNC: 0.2 MG/DL — SIGNIFICANT CHANGE UP (ref 0.2–1.2)
BILIRUB UR-MCNC: NEGATIVE — SIGNIFICANT CHANGE UP
BUN SERPL-MCNC: 9 MG/DL — SIGNIFICANT CHANGE UP (ref 7–23)
CALCIUM SERPL-MCNC: 9.4 MG/DL — SIGNIFICANT CHANGE UP (ref 8.4–10.5)
CHLORIDE SERPL-SCNC: 100 MMOL/L — SIGNIFICANT CHANGE UP (ref 96–108)
CO2 SERPL-SCNC: 28 MMOL/L — SIGNIFICANT CHANGE UP (ref 22–31)
COLOR SPEC: YELLOW — SIGNIFICANT CHANGE UP
CREAT SERPL-MCNC: 0.51 MG/DL — SIGNIFICANT CHANGE UP (ref 0.5–1.3)
DIFF PNL FLD: NEGATIVE — SIGNIFICANT CHANGE UP
EOSINOPHIL # BLD AUTO: 0.08 K/UL — SIGNIFICANT CHANGE UP (ref 0–0.5)
EOSINOPHIL NFR BLD AUTO: 1.3 % — SIGNIFICANT CHANGE UP (ref 0–6)
GLUCOSE SERPL-MCNC: 236 MG/DL — HIGH (ref 70–99)
GLUCOSE UR QL: 500
HCT VFR BLD CALC: 39.3 % — SIGNIFICANT CHANGE UP (ref 34.5–45)
HGB BLD-MCNC: 12.4 G/DL — SIGNIFICANT CHANGE UP (ref 11.5–15.5)
IMM GRANULOCYTES NFR BLD AUTO: 0.3 % — SIGNIFICANT CHANGE UP (ref 0–1.5)
KETONES UR-MCNC: NEGATIVE — SIGNIFICANT CHANGE UP
LEUKOCYTE ESTERASE UR-ACNC: NEGATIVE — SIGNIFICANT CHANGE UP
LIDOCAIN IGE QN: 27 U/L — SIGNIFICANT CHANGE UP (ref 7–60)
LYMPHOCYTES # BLD AUTO: 2.51 K/UL — SIGNIFICANT CHANGE UP (ref 1–3.3)
LYMPHOCYTES # BLD AUTO: 40 % — SIGNIFICANT CHANGE UP (ref 13–44)
MCHC RBC-ENTMCNC: 27.3 PG — SIGNIFICANT CHANGE UP (ref 27–34)
MCHC RBC-ENTMCNC: 31.6 GM/DL — LOW (ref 32–36)
MCV RBC AUTO: 86.6 FL — SIGNIFICANT CHANGE UP (ref 80–100)
MONOCYTES # BLD AUTO: 0.45 K/UL — SIGNIFICANT CHANGE UP (ref 0–0.9)
MONOCYTES NFR BLD AUTO: 7.2 % — SIGNIFICANT CHANGE UP (ref 2–14)
NEUTROPHILS # BLD AUTO: 3.2 K/UL — SIGNIFICANT CHANGE UP (ref 1.8–7.4)
NEUTROPHILS NFR BLD AUTO: 50.9 % — SIGNIFICANT CHANGE UP (ref 43–77)
NITRITE UR-MCNC: NEGATIVE — SIGNIFICANT CHANGE UP
NRBC # BLD: 0 /100 WBCS — SIGNIFICANT CHANGE UP (ref 0–0)
PH UR: 6 — SIGNIFICANT CHANGE UP (ref 5–8)
PLATELET # BLD AUTO: 245 K/UL — SIGNIFICANT CHANGE UP (ref 150–400)
POTASSIUM SERPL-MCNC: 4.7 MMOL/L — SIGNIFICANT CHANGE UP (ref 3.5–5.3)
POTASSIUM SERPL-SCNC: 4.7 MMOL/L — SIGNIFICANT CHANGE UP (ref 3.5–5.3)
PROT SERPL-MCNC: 8 G/DL — SIGNIFICANT CHANGE UP (ref 6–8.3)
PROT UR-MCNC: NEGATIVE MG/DL — SIGNIFICANT CHANGE UP
RBC # BLD: 4.54 M/UL — SIGNIFICANT CHANGE UP (ref 3.8–5.2)
RBC # FLD: 13.1 % — SIGNIFICANT CHANGE UP (ref 10.3–14.5)
SARS-COV-2 RNA SPEC QL NAA+PROBE: SIGNIFICANT CHANGE UP
SODIUM SERPL-SCNC: 138 MMOL/L — SIGNIFICANT CHANGE UP (ref 135–145)
SP GR SPEC: 1.01 — SIGNIFICANT CHANGE UP (ref 1–1.03)
UROBILINOGEN FLD QL: 0.2 E.U./DL — SIGNIFICANT CHANGE UP
WBC # BLD: 6.28 K/UL — SIGNIFICANT CHANGE UP (ref 3.8–10.5)
WBC # FLD AUTO: 6.28 K/UL — SIGNIFICANT CHANGE UP (ref 3.8–10.5)

## 2021-07-08 PROCEDURE — 93010 ELECTROCARDIOGRAM REPORT: CPT

## 2021-07-08 PROCEDURE — 99234 HOSP IP/OBS SM DT SF/LOW 45: CPT

## 2021-07-08 PROCEDURE — 87086 URINE CULTURE/COLONY COUNT: CPT

## 2021-07-08 PROCEDURE — U0005: CPT

## 2021-07-08 PROCEDURE — 84484 ASSAY OF TROPONIN QUANT: CPT

## 2021-07-08 PROCEDURE — 96374 THER/PROPH/DIAG INJ IV PUSH: CPT

## 2021-07-08 PROCEDURE — 96361 HYDRATE IV INFUSION ADD-ON: CPT

## 2021-07-08 PROCEDURE — 82553 CREATINE MB FRACTION: CPT

## 2021-07-08 PROCEDURE — 99284 EMERGENCY DEPT VISIT MOD MDM: CPT

## 2021-07-08 PROCEDURE — 99284 EMERGENCY DEPT VISIT MOD MDM: CPT | Mod: 25

## 2021-07-08 PROCEDURE — 83690 ASSAY OF LIPASE: CPT

## 2021-07-08 PROCEDURE — 83880 ASSAY OF NATRIURETIC PEPTIDE: CPT

## 2021-07-08 PROCEDURE — U0003: CPT

## 2021-07-08 PROCEDURE — 80053 COMPREHEN METABOLIC PANEL: CPT

## 2021-07-08 PROCEDURE — 85025 COMPLETE CBC W/AUTO DIFF WBC: CPT

## 2021-07-08 PROCEDURE — 36415 COLL VENOUS BLD VENIPUNCTURE: CPT

## 2021-07-08 PROCEDURE — 81003 URINALYSIS AUTO W/O SCOPE: CPT

## 2021-07-08 PROCEDURE — 82550 ASSAY OF CK (CPK): CPT

## 2021-07-08 PROCEDURE — 93005 ELECTROCARDIOGRAM TRACING: CPT

## 2021-07-08 RX ORDER — PANTOPRAZOLE SODIUM 20 MG/1
40 TABLET, DELAYED RELEASE ORAL ONCE
Refills: 0 | Status: COMPLETED | OUTPATIENT
Start: 2021-07-08 | End: 2021-07-08

## 2021-07-08 RX ORDER — PANTOPRAZOLE SODIUM 20 MG/1
1 TABLET, DELAYED RELEASE ORAL
Qty: 30 | Refills: 0
Start: 2021-07-08 | End: 2021-08-06

## 2021-07-08 RX ORDER — SODIUM CHLORIDE 9 MG/ML
1000 INJECTION INTRAMUSCULAR; INTRAVENOUS; SUBCUTANEOUS ONCE
Refills: 0 | Status: COMPLETED | OUTPATIENT
Start: 2021-07-08 | End: 2021-07-08

## 2021-07-08 RX ORDER — METOPROLOL TARTRATE 50 MG
50 TABLET ORAL ONCE
Refills: 0 | Status: COMPLETED | OUTPATIENT
Start: 2021-07-08 | End: 2021-07-08

## 2021-07-08 RX ORDER — SUCRALFATE 1 G
1 TABLET ORAL
Qty: 120 | Refills: 0
Start: 2021-07-08 | End: 2021-08-06

## 2021-07-08 RX ORDER — METOPROLOL TARTRATE 50 MG
25 TABLET ORAL ONCE
Refills: 0 | Status: DISCONTINUED | OUTPATIENT
Start: 2021-07-08 | End: 2021-07-08

## 2021-07-08 RX ORDER — SUCRALFATE 1 G
1 TABLET ORAL ONCE
Refills: 0 | Status: COMPLETED | OUTPATIENT
Start: 2021-07-08 | End: 2021-07-08

## 2021-07-08 RX ADMIN — PANTOPRAZOLE SODIUM 40 MILLIGRAM(S): 20 TABLET, DELAYED RELEASE ORAL at 11:33

## 2021-07-08 RX ADMIN — Medication 30 MILLILITER(S): at 11:33

## 2021-07-08 RX ADMIN — Medication 1 GRAM(S): at 11:33

## 2021-07-08 RX ADMIN — SODIUM CHLORIDE 1000 MILLILITER(S): 9 INJECTION INTRAMUSCULAR; INTRAVENOUS; SUBCUTANEOUS at 14:09

## 2021-07-08 RX ADMIN — SODIUM CHLORIDE 1000 MILLILITER(S): 9 INJECTION INTRAMUSCULAR; INTRAVENOUS; SUBCUTANEOUS at 11:33

## 2021-07-08 NOTE — ED ADULT NURSE REASSESSMENT NOTE - NS ED NURSE REASSESS COMMENT FT1
Patient alert and oriented x 3 , was evaluated by cardiologists and ordered cardiac CTA ,but patient refused to have another IV access and the Test , ASHLEY Randhawa explained the importance of it but patient wants to go home . Denies any chest pain at this time . AMA form signed and witnessed , left in stable condition .

## 2021-07-08 NOTE — ED ADULT TRIAGE NOTE - CHIEF COMPLAINT QUOTE
pt c/o abdominal pain x 1 month  & feeling hot all over since having an colonoscopy. pt states " I went to [facility] & they did nothing for me, so I'm coming here." hx of DM on metformin.

## 2021-07-08 NOTE — ED ADULT NURSE REASSESSMENT NOTE - NS ED NURSE REASSESS COMMENT FT1
Patient observed to have ECG changes, Dr. Agosto evaluated pt at bedside, plan to admit to Cardiology for echo.

## 2021-07-08 NOTE — CONSULT NOTE ADULT - ATTENDING COMMENTS
Initial attending contact date   7/8/21   . See fellow note written above for details. I reviewed the fellow documentation. I have personally seen and examined this patient. I reviewed vitals, labs, medications, cardiac studies, and additional imaging. I agree with the above fellow's findings and plans as written above with the following additions/statements.    Patient seen and examined in ER  -Complaining of predominant abd pain with occasional radiation to chest. No instigating/exacerbating factors  -EKG NSR with mild .5 ROCIO in I and AVL which does not meet criteria. Subsequent EKG with resolution  -Trop negative   -Pt with atypical CP not ACS, initial EKG of no clinical significance  -Recommend CCTA to definitively r/o CAD which can be done in ER or as outpatient

## 2021-07-08 NOTE — CONSULT NOTE ADULT - SUBJECTIVE AND OBJECTIVE BOX
HPI:      ROS: A 10-point review of systems was otherwise negative.    PAST MEDICAL & SURGICAL HISTORY:  Type 2 diabetes mellitus without complication, without long-term current use of insulin    H. pylori infection    Migraines    S/P cholecystectomy        SOCIAL HISTORY:  FAMILY HISTORY:  No pertinent family history in first degree relatives        ALLERGIES: 	  adhesives (Blisters)  penicillin (Hives)  penicillin (Rash)            MEDICATIONS:      PHYSICAL EXAM:  T(C): 36.8 (07-08-21 @ 15:30), Max: 37.1 (07-08-21 @ 10:39)  HR: 76 (07-08-21 @ 15:30) (76 - 85)  BP: 138/85 (07-08-21 @ 15:30) (131/91 - 138/85)  RR: 16 (07-08-21 @ 15:30) (16 - 16)  SpO2: 99% (07-08-21 @ 15:30) (99% - 99%)  Wt(kg): --    GEN: Awake, comfortable. NAD.   HEENT: NCAT, PERRL, EOMI. Mucosa moist. No JVD.   RESP: CTA b/l  CV: RRR, normal s1/s2. No m/r/g.  ABD: Soft, NTND. BS+  EXT: Warm. No edema, clubbing, or cyanosis.   NEURO: AAOx3. No focal deficits.    I&O's Summary    Height (cm): 165.1 (07-08 @ 10:39)  Weight (kg): 114.3 (07-08 @ 10:39)  BMI (kg/m2): 41.9 (07-08 @ 10:39)  BSA (m2): 2.18 (07-08 @ 10:39)  	  LABS:	 	    CARDIAC MARKERS:                                  12.4   6.28  )-----------( 245      ( 08 Jul 2021 11:17 )             39.3     07-08    138  |  100  |  9   ----------------------------<  236<H>  4.7   |  28  |  0.51    Ca    9.4      08 Jul 2021 11:18    TPro  8.0  /  Alb  3.9  /  TBili  0.2  /  DBili  x   /  AST  23  /  ALT  24  /  AlkPhos  85  07-08    proBNP: Serum Pro-Brain Natriuretic Peptide: 38 pg/mL (07-08 @ 11:18)    Lipid Profile:   HgA1c:   TSH:     TELEMETRY: 	    ECG:  	  RADIOLOGY:   ECHO:  STRESS:  CATH:   HPI: This is a 46 y.o F w/ PMH pd DM2 ( uncontrolled). HTN ( not on meds, GERD who presents to the hospital today for dyspepsia and nausea. Of note, pt has had several episodes of epigastric pain. She has been worked up with EGD/ C scope which was normal. She also had a cardiology workup in 2018. She had a stress test done that was normal. She had an Echo that showed LVEF 55% w/ mild diastolic dysfunction. At the time her complaints were similar to her presenting symptoms in the ED today. Per pt, her epigastric " pressure/ pain" starts in the lower portion of her chest and travels up. She has no associated diaphoresis or radiation of pain. Her pain is present at both rest and on exertion. It lasts for an hour when it comes on. Only nexium improves her pain. Denies any chest painor exertion or limited exercise tolerance.     Home meds:   Metformin   Nexium     ROS: A 10-point review of systems was otherwise negative.    PAST MEDICAL & SURGICAL HISTORY:  Type 2 diabetes mellitus without complication, without long-term current use of insulin    H. pylori infection    Migraines    S/P cholecystectomy        SOCIAL HISTORY:  FAMILY HISTORY:  No pertinent family history in first degree relatives        ALLERGIES: 	  adhesives (Blisters)  penicillin (Hives)  penicillin (Rash)    MEDICATIONS:      PHYSICAL EXAM:  T(C): 36.8 (07-08-21 @ 15:30), Max: 37.1 (07-08-21 @ 10:39)  HR: 76 (07-08-21 @ 15:30) (76 - 85)  BP: 138/85 (07-08-21 @ 15:30) (131/91 - 138/85)  RR: 16 (07-08-21 @ 15:30) (16 - 16)  SpO2: 99% (07-08-21 @ 15:30) (99% - 99%)  Wt(kg): --    GEN: Awake, comfortable. NAD.   HEENT: NCAT, PERRL, EOMI. Mucosa moist. No JVD.   RESP: CTA b/l  CV: RRR, normal s1/s2. No m/r/g.  ABD: Soft, NTND. BS+  EXT: Warm. No edema, clubbing, or cyanosis.   NEURO: AAOx3. No focal deficits.    I&O's Summary    Height (cm): 165.1 (07-08 @ 10:39)  Weight (kg): 114.3 (07-08 @ 10:39)  BMI (kg/m2): 41.9 (07-08 @ 10:39)  BSA (m2): 2.18 (07-08 @ 10:39)  	  LABS:	 	    CARDIAC MARKERS:                                  12.4   6.28  )-----------( 245      ( 08 Jul 2021 11:17 )             39.3     07-08    138  |  100  |  9   ----------------------------<  236<H>  4.7   |  28  |  0.51    Ca    9.4      08 Jul 2021 11:18    TPro  8.0  /  Alb  3.9  /  TBili  0.2  /  DBili  x   /  AST  23  /  ALT  24  /  AlkPhos  85  07-08

## 2021-07-08 NOTE — CONSULT NOTE ADULT - ASSESSMENT
This is a 46 y.o F w/ PMH pd DM2 ( uncontrolled). HTN ( not on meds, GERD who presents to the hospital today for dyspepsia and nausea. Cardiology consulted for EKG changes.     # EKG changes and atypical chest pain   Her pain is mostly abdominal. She does not have typical anginal pain. Her " pain" is not worsened with exercise and is present at rest as well.  She had a stress test done that was normal. She had an Echo that showed LVEF 55% w/ mild diastolic dysfunction. At the time her complaints were similar to her presenting symptoms in the ED today.   - EKG showed 0.5mm ST elevations in Lead 1, avL with no reciprocal ST depressions  - Repeat EKG with improvement. Her EKG today is similar to her EKG back in May 2021  - Low risk for ACS  - Recommend CCTA either today in the ED or as outpatient  - Does not need cardiac telemetry admission  at this point as low suspicion   - Pt can follow up with her cardiologist Dr. Gtz    Case discussed with attending. ED attending updated.

## 2021-07-08 NOTE — ED PROVIDER NOTE - CLINICAL SUMMARY MEDICAL DECISION MAKING FREE TEXT BOX
pt here for abd pain and cp  ecg shows st elevation in one lead  ct abd neg for acute pathology  trop and labs neg  caredio consulted, recommended CT with IV coronary  pt did not want ct, refused IV  pt by pa, attending, nurse, and cardio team informed of risks of leaving against medical advice and risk of not doing farther work up. pt verbalies understanding and states she does not want any intervention. advised fu with cardiology in 1 day. pt given contact info for cardio appt. if new or worsening sxs advised to return to ed

## 2021-07-08 NOTE — ED ADULT NURSE NOTE - NSIMPLEMENTINTERV_GEN_ALL_ED
Implemented All Universal Safety Interventions:  [place] to call system. Call bell, personal items and telephone within reach. Instruct patient to call for assistance. Room bathroom lighting operational. Non-slip footwear when patient is off stretcher. Physically safe environment: no spills, clutter or unnecessary equipment. Stretcher in lowest position, wheels locked, appropriate side rails in place.

## 2021-07-08 NOTE — ED PROVIDER NOTE - OBJECTIVE STATEMENT
47 y/o F PMHx t2dm, hld, gerd, Shx cholecystectomy. Presents to ED with dyspepsia and severe burning to the chest. Pt reports colonoscopy done 1 month ago and endoscopy 3 years ago, all tests done at [place]. The endoscopy showed inflammation, however no ulcers were noted. The colonoscopy showed polyps in the colon. Pt states that she is taking Nexium 20mg and Pepcid without relief to her symptoms. She was advised by her PCP to increase Nexium to 40mg which she has been compliant with, however Pt feels that her symptoms are persistent and unresolved. No smoking or drinking. Denies fevers, cold symptoms, decreased eating, sob, chest pain, vomiting, diarrhea, and dysuria.

## 2021-07-08 NOTE — ED PROVIDER NOTE - NS_EDPROVIDERDISPOUSERTYPE_ED_A_ED
Encounter addended by: Macarena Peng RN on: 11/24/2020 11:48 AM   Actions taken: Charge Capture section accepted, Flowsheet accepted Scribe Attestation (For Scribes USE Only)... Attending Attestation (For Attendings USE Only).../Scribe Attestation (For Scribes USE Only)...

## 2021-07-08 NOTE — ED PROVIDER NOTE - PMH
GERD (gastroesophageal reflux disease)    H. pylori infection    HLD (hyperlipidemia)    Migraines    Type 2 diabetes mellitus without complication, without long-term current use of insulin

## 2021-07-08 NOTE — ED PROVIDER NOTE - CARE PROVIDERS DIRECT ADDRESSES
[email or web address]

## 2021-07-08 NOTE — ED ADULT NURSE NOTE - CHIEF COMPLAINT QUOTE
pt c/o abdominal pain x 1 month  & feeling hot all over since having an colonoscopy. pt states " I went to [facility] & they did nothing for me, so I'm coming here." hx of DM on metformin.

## 2021-07-08 NOTE — ED PROVIDER NOTE - PATIENT PORTAL LINK FT
You can access the FollowMyHealth Patient Portal offered by [facility] by registering at the following website: [email or web address]. By joining [employer]’s FollowMyHealth portal, you will also be able to view your health information using other applications (apps) compatible with our system.

## 2021-07-08 NOTE — ED PROVIDER NOTE - CARE PROVIDER_API CALL
Johann Simeon)  Cardiovascular Disease; Interventional Cardiology  [address] [address]  Phone: [phone]  Fax: [phone]  Follow Up Time:     Hanna Leger G  CARDIOLOGY  [address] [address]  Phone: [phone]  Fax: [phone]  Follow Up Time:

## 2021-07-09 LAB
CULTURE RESULTS: SIGNIFICANT CHANGE UP
SPECIMEN SOURCE: SIGNIFICANT CHANGE UP

## 2021-07-12 PROBLEM — E78.5 HYPERLIPIDEMIA, UNSPECIFIED: Chronic | Status: ACTIVE | Noted: 2021-07-09

## 2021-07-12 PROBLEM — K21.9 GASTRO-ESOPHAGEAL REFLUX DISEASE WITHOUT ESOPHAGITIS: Chronic | Status: ACTIVE | Noted: 2021-07-09

## 2021-07-12 NOTE — DISCUSSION/SUMMARY
[FreeTextEntry1] : Ms Su is here for follow up. she has had vague abdominal pain . her transvaginal ultrasound is unremarkable. no abnormal findings. \par she was advised to have her GP evaluate her and possibly get referral to GI for work up & also cardiologist. she understands & will follow up with the above physicians.

## 2021-07-30 ENCOUNTER — APPOINTMENT (OUTPATIENT)
Dept: HEART AND VASCULAR | Facility: CLINIC | Age: 46
End: 2021-07-30
Payer: MEDICAID

## 2021-07-30 ENCOUNTER — NON-APPOINTMENT (OUTPATIENT)
Age: 46
End: 2021-07-30

## 2021-07-30 VITALS
HEART RATE: 90 BPM | DIASTOLIC BLOOD PRESSURE: 86 MMHG | HEIGHT: 65 IN | TEMPERATURE: 98.3 F | OXYGEN SATURATION: 96 % | WEIGHT: 256.99 LBS | SYSTOLIC BLOOD PRESSURE: 146 MMHG | BODY MASS INDEX: 42.82 KG/M2

## 2021-07-30 DIAGNOSIS — Z80.9 FAMILY HISTORY OF MALIGNANT NEOPLASM, UNSPECIFIED: ICD-10-CM

## 2021-07-30 DIAGNOSIS — Z82.3 FAMILY HISTORY OF STROKE: ICD-10-CM

## 2021-07-30 DIAGNOSIS — R07.9 CHEST PAIN, UNSPECIFIED: ICD-10-CM

## 2021-07-30 DIAGNOSIS — Z82.49 FAMILY HISTORY OF ISCHEMIC HEART DISEASE AND OTHER DISEASES OF THE CIRCULATORY SYSTEM: ICD-10-CM

## 2021-07-30 DIAGNOSIS — E11.9 TYPE 2 DIABETES MELLITUS W/OUT COMPLICATIONS: ICD-10-CM

## 2021-07-30 PROCEDURE — 36415 COLL VENOUS BLD VENIPUNCTURE: CPT

## 2021-07-30 PROCEDURE — 93000 ELECTROCARDIOGRAM COMPLETE: CPT

## 2021-07-30 PROCEDURE — 99214 OFFICE O/P EST MOD 30 MIN: CPT

## 2021-07-30 RX ORDER — ISOPROPYL ALCOHOL 70 ML/100ML
70 SWAB TOPICAL
Qty: 100 | Refills: 0 | Status: DISCONTINUED | COMMUNITY
Start: 2019-07-19 | End: 2021-07-30

## 2021-07-30 RX ORDER — ACETAMINOPHEN 325 MG/1
325 TABLET ORAL
Qty: 30 | Refills: 0 | Status: DISCONTINUED | COMMUNITY
Start: 2019-11-05 | End: 2021-07-30

## 2021-07-30 RX ORDER — AMOXICILLIN 500 MG/1
500 CAPSULE ORAL
Qty: 21 | Refills: 0 | Status: DISCONTINUED | COMMUNITY
Start: 2019-08-08 | End: 2021-07-30

## 2021-07-30 RX ORDER — DULAGLUTIDE 0.75 MG/.5ML
0.75 INJECTION, SOLUTION SUBCUTANEOUS
Qty: 4 | Refills: 10 | Status: ACTIVE | COMMUNITY
Start: 2021-07-30

## 2021-07-30 RX ORDER — CEPHALEXIN 500 MG/1
500 CAPSULE ORAL
Qty: 20 | Refills: 0 | Status: DISCONTINUED | COMMUNITY
Start: 2019-08-29 | End: 2021-07-30

## 2021-07-30 RX ORDER — FLUCONAZOLE 150 MG/1
150 TABLET ORAL DAILY
Qty: 3 | Refills: 5 | Status: DISCONTINUED | COMMUNITY
Start: 2021-04-28 | End: 2021-07-30

## 2021-07-30 RX ORDER — METFORMIN ER 500 MG 500 MG/1
500 TABLET ORAL
Qty: 60 | Refills: 0 | Status: DISCONTINUED | COMMUNITY
Start: 2019-12-21 | End: 2021-07-30

## 2021-07-30 RX ORDER — LANCETS
EACH MISCELLANEOUS
Qty: 100 | Refills: 0 | Status: DISCONTINUED | COMMUNITY
Start: 2019-07-19 | End: 2021-07-30

## 2021-07-30 RX ORDER — POLYETHYLENE GLYCOL 3350 17 G/17G
17 POWDER, FOR SOLUTION ORAL
Qty: 510 | Refills: 0 | Status: DISCONTINUED | COMMUNITY
Start: 2019-06-05 | End: 2021-07-30

## 2021-07-30 RX ORDER — RANITIDINE 150 MG/1
150 TABLET ORAL
Qty: 28 | Refills: 0 | Status: DISCONTINUED | COMMUNITY
Start: 2019-08-20 | End: 2021-07-30

## 2021-07-30 RX ORDER — ALUMINUM HYDROXIDE, MAGNESIUM HYDROXIDE, DIMETHICONE 200; 200; 20 MG/5ML; MG/5ML; MG/5ML
200-200-20 LIQUID ORAL
Qty: 355 | Refills: 0 | Status: DISCONTINUED | COMMUNITY
Start: 2019-07-19 | End: 2021-07-30

## 2021-07-30 RX ORDER — SODIUM CHLORIDE 0.65 %
0.65 AEROSOL, SPRAY (ML) NASAL
Qty: 45 | Refills: 0 | Status: DISCONTINUED | COMMUNITY
Start: 2019-07-31 | End: 2021-07-30

## 2021-07-30 RX ORDER — SUMATRIPTAN 25 MG/1
25 TABLET, FILM COATED ORAL
Qty: 10 | Refills: 0 | Status: DISCONTINUED | COMMUNITY
Start: 2020-01-31 | End: 2021-07-30

## 2021-07-30 RX ORDER — IBUPROFEN 600 MG/1
600 TABLET, FILM COATED ORAL
Qty: 28 | Refills: 0 | Status: DISCONTINUED | COMMUNITY
Start: 2019-08-08 | End: 2021-07-30

## 2021-07-30 RX ORDER — METFORMIN HYDROCHLORIDE 500 MG/1
500 TABLET, COATED ORAL TWICE DAILY
Refills: 0 | Status: DISCONTINUED | COMMUNITY
End: 2021-07-30

## 2021-07-30 NOTE — HISTORY OF PRESENT ILLNESS
[FreeTextEntry1] : 46 F  HTN DM Headaches ZAHRAA GERD  here to establish care due to distance used to see Dr. CAMPOS was in the ED for abdominal pain was told she has a "spike" in her EKG and CCTA was ordered not done due to poor IV access she has no exertional symptoms \par \par \par ecg nsr \par \par echo 7/2021 NOrmal EF

## 2021-07-30 NOTE — ASSESSMENT
[FreeTextEntry1] : Abdominal pain is not cardiac\par i will check her labs she will need statin therapy \par DM check labs on trulicity\par HTN her bp is elevated \par I referred her for bariatric surgery \par fu in one month

## 2021-08-02 LAB
ALBUMIN SERPL ELPH-MCNC: 4.4 G/DL
ALP BLD-CCNC: 93 U/L
ALT SERPL-CCNC: 24 U/L
ANION GAP SERPL CALC-SCNC: 13 MMOL/L
AST SERPL-CCNC: 17 U/L
BILIRUB SERPL-MCNC: 0.2 MG/DL
BUN SERPL-MCNC: 7 MG/DL
CALCIUM SERPL-MCNC: 9.5 MG/DL
CHLORIDE SERPL-SCNC: 100 MMOL/L
CHOLEST SERPL-MCNC: 178 MG/DL
CO2 SERPL-SCNC: 26 MMOL/L
CREAT SERPL-MCNC: 0.54 MG/DL
CREAT SPEC-SCNC: 102 MG/DL
ESTIMATED AVERAGE GLUCOSE: 220 MG/DL
GLUCOSE SERPL-MCNC: 187 MG/DL
HBA1C MFR BLD HPLC: 9.3 %
HDLC SERPL-MCNC: 46 MG/DL
LDLC SERPL CALC-MCNC: 108 MG/DL
MICROALBUMIN 24H UR DL<=1MG/L-MCNC: <1.2 MG/DL
MICROALBUMIN/CREAT 24H UR-RTO: NORMAL MG/G
NONHDLC SERPL-MCNC: 133 MG/DL
POTASSIUM SERPL-SCNC: 4.9 MMOL/L
PROT SERPL-MCNC: 7.5 G/DL
SODIUM SERPL-SCNC: 139 MMOL/L
TRIGL SERPL-MCNC: 124 MG/DL

## 2021-08-27 ENCOUNTER — EMERGENCY (EMERGENCY)
Facility: HOSPITAL | Age: 46
LOS: 1 days | Discharge: HOME | End: 2021-08-27
Attending: EMERGENCY MEDICINE | Admitting: EMERGENCY MEDICINE
Payer: MEDICAID

## 2021-08-27 VITALS
RESPIRATION RATE: 18 BRPM | SYSTOLIC BLOOD PRESSURE: 159 MMHG | DIASTOLIC BLOOD PRESSURE: 93 MMHG | TEMPERATURE: 98 F | HEIGHT: 65 IN | HEART RATE: 96 BPM | OXYGEN SATURATION: 97 %

## 2021-08-27 DIAGNOSIS — Z88.0 ALLERGY STATUS TO PENICILLIN: ICD-10-CM

## 2021-08-27 DIAGNOSIS — M79.661 PAIN IN RIGHT LOWER LEG: ICD-10-CM

## 2021-08-27 DIAGNOSIS — E11.9 TYPE 2 DIABETES MELLITUS WITHOUT COMPLICATIONS: ICD-10-CM

## 2021-08-27 DIAGNOSIS — Z79.4 LONG TERM (CURRENT) USE OF INSULIN: ICD-10-CM

## 2021-08-27 DIAGNOSIS — E66.9 OBESITY, UNSPECIFIED: ICD-10-CM

## 2021-08-27 DIAGNOSIS — Z90.49 ACQUIRED ABSENCE OF OTHER SPECIFIED PARTS OF DIGESTIVE TRACT: Chronic | ICD-10-CM

## 2021-08-27 DIAGNOSIS — Z91.048 OTHER NONMEDICINAL SUBSTANCE ALLERGY STATUS: ICD-10-CM

## 2021-08-27 DIAGNOSIS — Z87.891 PERSONAL HISTORY OF NICOTINE DEPENDENCE: ICD-10-CM

## 2021-08-27 LAB
ALBUMIN SERPL ELPH-MCNC: 4.1 G/DL — SIGNIFICANT CHANGE UP (ref 3.3–5)
ALP SERPL-CCNC: 91 U/L — SIGNIFICANT CHANGE UP (ref 40–120)
ALT FLD-CCNC: 23 U/L — SIGNIFICANT CHANGE UP (ref 10–45)
ANION GAP SERPL CALC-SCNC: 8 MMOL/L — SIGNIFICANT CHANGE UP (ref 5–17)
AST SERPL-CCNC: 24 U/L — SIGNIFICANT CHANGE UP (ref 10–40)
BASOPHILS # BLD AUTO: 0.01 K/UL — SIGNIFICANT CHANGE UP (ref 0–0.2)
BASOPHILS NFR BLD AUTO: 0.2 % — SIGNIFICANT CHANGE UP (ref 0–2)
BILIRUB SERPL-MCNC: 0.2 MG/DL — SIGNIFICANT CHANGE UP (ref 0.2–1.2)
BUN SERPL-MCNC: 12 MG/DL — SIGNIFICANT CHANGE UP (ref 7–23)
CALCIUM SERPL-MCNC: 9.8 MG/DL — SIGNIFICANT CHANGE UP (ref 8.4–10.5)
CHLORIDE SERPL-SCNC: 102 MMOL/L — SIGNIFICANT CHANGE UP (ref 96–108)
CO2 SERPL-SCNC: 31 MMOL/L — SIGNIFICANT CHANGE UP (ref 22–31)
CREAT SERPL-MCNC: 0.62 MG/DL — SIGNIFICANT CHANGE UP (ref 0.5–1.3)
EOSINOPHIL # BLD AUTO: 0.12 K/UL — SIGNIFICANT CHANGE UP (ref 0–0.5)
EOSINOPHIL NFR BLD AUTO: 1.8 % — SIGNIFICANT CHANGE UP (ref 0–6)
GLUCOSE SERPL-MCNC: 249 MG/DL — HIGH (ref 70–99)
HCT VFR BLD CALC: 37 % — SIGNIFICANT CHANGE UP (ref 34.5–45)
HGB BLD-MCNC: 11.8 G/DL — SIGNIFICANT CHANGE UP (ref 11.5–15.5)
IMM GRANULOCYTES NFR BLD AUTO: 0.2 % — SIGNIFICANT CHANGE UP (ref 0–1.5)
LYMPHOCYTES # BLD AUTO: 2.58 K/UL — SIGNIFICANT CHANGE UP (ref 1–3.3)
LYMPHOCYTES # BLD AUTO: 39.1 % — SIGNIFICANT CHANGE UP (ref 13–44)
MCHC RBC-ENTMCNC: 27.5 PG — SIGNIFICANT CHANGE UP (ref 27–34)
MCHC RBC-ENTMCNC: 31.9 GM/DL — LOW (ref 32–36)
MCV RBC AUTO: 86.2 FL — SIGNIFICANT CHANGE UP (ref 80–100)
MONOCYTES # BLD AUTO: 0.5 K/UL — SIGNIFICANT CHANGE UP (ref 0–0.9)
MONOCYTES NFR BLD AUTO: 7.6 % — SIGNIFICANT CHANGE UP (ref 2–14)
NEUTROPHILS # BLD AUTO: 3.38 K/UL — SIGNIFICANT CHANGE UP (ref 1.8–7.4)
NEUTROPHILS NFR BLD AUTO: 51.1 % — SIGNIFICANT CHANGE UP (ref 43–77)
NRBC # BLD: 0 /100 WBCS — SIGNIFICANT CHANGE UP (ref 0–0)
PLATELET # BLD AUTO: 245 K/UL — SIGNIFICANT CHANGE UP (ref 150–400)
POTASSIUM SERPL-MCNC: 4.4 MMOL/L — SIGNIFICANT CHANGE UP (ref 3.5–5.3)
POTASSIUM SERPL-SCNC: 4.4 MMOL/L — SIGNIFICANT CHANGE UP (ref 3.5–5.3)
PROT SERPL-MCNC: 7.9 G/DL — SIGNIFICANT CHANGE UP (ref 6–8.3)
RBC # BLD: 4.29 M/UL — SIGNIFICANT CHANGE UP (ref 3.8–5.2)
RBC # FLD: 13.3 % — SIGNIFICANT CHANGE UP (ref 10.3–14.5)
SODIUM SERPL-SCNC: 141 MMOL/L — SIGNIFICANT CHANGE UP (ref 135–145)
WBC # BLD: 6.6 K/UL — SIGNIFICANT CHANGE UP (ref 3.8–10.5)
WBC # FLD AUTO: 6.6 K/UL — SIGNIFICANT CHANGE UP (ref 3.8–10.5)

## 2021-08-27 PROCEDURE — 93971 EXTREMITY STUDY: CPT | Mod: 26,RT

## 2021-08-27 PROCEDURE — 80053 COMPREHEN METABOLIC PANEL: CPT

## 2021-08-27 PROCEDURE — 93971 EXTREMITY STUDY: CPT

## 2021-08-27 PROCEDURE — 85025 COMPLETE CBC W/AUTO DIFF WBC: CPT

## 2021-08-27 PROCEDURE — 99285 EMERGENCY DEPT VISIT HI MDM: CPT

## 2021-08-27 PROCEDURE — 99284 EMERGENCY DEPT VISIT MOD MDM: CPT | Mod: 25

## 2021-08-27 PROCEDURE — 36415 COLL VENOUS BLD VENIPUNCTURE: CPT

## 2021-08-27 NOTE — ED PROVIDER NOTE - OBJECTIVE STATEMENT
Pt w/ PMHx obesity, DM on Trulicity p/w RLE pain, from the medial thigh to the calf. No swelling or skin changes noted. No CP, SOB. No hx PE / DVT. No recent travel / immobilization / surgery. No exogenous hormone use. No hemoptysis. no trauma.

## 2021-08-27 NOTE — ED PROVIDER NOTE - PHYSICAL EXAMINATION
Constitutional: Well appearing, obese, awake, alert, oriented to person, place, time/situation and in no apparent distress.  ENMT: Airway patent.   Eyes: Clear bilaterally  Cardiac: Normal rate, regular rhythm.   Respiratory: No increased WOB, tachypnea, hypoxia, or accessory mm use. Pt speaks in full sentences.   Musculoskeletal: legs appear symmetric in size, + R calf ttp. +varicosities to b/l LE. 1+ pedal pulses b/l. motor / sensation intact.   Neuro: Alert and oriented x 3, face symmetric and speech fluent. Nml gross motor movement, grossly non focal   Skin: Skin normal color for race, warm, dry and intact. No evidence of rash. no wounds or ulcers  Psych: Alert and oriented to person, place, time/situation. normal mood and affect. no apparent risk to self or others.

## 2021-08-27 NOTE — ED ADULT TRIAGE NOTE - ESI TRIAGE ACUITY LEVEL, MLM
Called pt after receiving a message from jairo Sofia psychiatric NP. Pt would like help navigating neurology dept services as they need to get tested for Gregory's Disease. Pt went to see another specialist in another healthcare system and had a negative experience. Pt is worried and concerned about having another negative experience. Pt would like writer to attend consult with them and their .     Plan:  1. Writer researching Neurology clinic appointment/referral process.  2. Writer will message pt with clinic information and how to obtain an appointment.   3. Pt will make an appointment & let writer know when this is scheduled.   4. Writer will attend appt with pt.     [facility]  Transgender Care Coordinator    
4

## 2021-08-27 NOTE — ED ADULT NURSE NOTE - OBJECTIVE STATEMENT
46y female c/o RLE pain x 3 days. pt states, "I started taking Trulicity one month ago. I have been doing the injections in my thigh so maybe that's why I have pain. I am worried I have a blood clot." pt reports tenderness and pain with walking to R thigh and calf. states, "I got a really bad cramp in my right calf earlier today." hx of DM2. not vaccinated for covid. Respirations even and unlabored. speaking in clear, full sentences. ambulates w/ steady gait. denies CP, SOB, n/v/d, headache, numbness, tingling. no trauma/visible injury. denies birth control use/recent travel

## 2021-08-27 NOTE — ED PROVIDER NOTE - NSFOLLOWUPINSTRUCTIONS_ED_ALL_ED_FT
Your blood work was within normal limits.     Leg Pain    WHAT YOU NEED TO KNOW:    Leg pain may be caused by a variety of health conditions. Your tests did not show any broken bones or blood clots.    DISCHARGE INSTRUCTIONS:    Return to the emergency department if:   •You have a fever.      •Your leg starts to swell.      •Your leg pain gets worse.      •You have numbness or tingling in your leg or toes.      •You cannot put any weight on or move your leg.      Contact your healthcare provider if:   •Your pain does not decrease, even after treatment.      •You have questions or concerns about your condition or care.      Medicines:   •NSAIDs, such as ibuprofen, help decrease swelling, pain, and fever. This medicine is available with or without a doctor's order. NSAIDs can cause stomach bleeding or kidney problems in certain people. If you take blood thinner medicine, always ask your healthcare provider if NSAIDs are safe for you. Always read the medicine label and follow directions.      •Take your medicine as directed. Contact your healthcare provider if you think your medicine is not helping or if you have side effects. Tell him of her if you are allergic to any medicine. Keep a list of the medicines, vitamins, and herbs you take. Include the amounts, and when and why you take them. Bring the list or the pill bottles to follow-up visits. Carry your medicine list with you in case of an emergency.      Follow up with your healthcare provider as directed: You may need more tests to find the cause of your leg pain. You may need to see an orthopedic specialist or a physical therapist. Write down your questions so you remember to ask them during your visits.    Manage your leg pain:   •Rest your injured leg so that it can heal. You may need an immobilizer, brace, or splint to limit the movement of your leg. You may need to avoid putting any weight on your leg for at least 48 hours. Return to normal activities as directed.      •Ice the injury for 20 minutes every 4 hours for up to 24 hours, or as directed. Use an ice pack, or put crushed ice in a plastic bag. Cover it with a towel to protect your skin. Ice helps prevent tissue damage and decreases swelling and pain.      •Elevate your injured leg above the level of your heart as often as you can. This will help decrease swelling and pain. If possible, prop your leg on pillows or blankets to keep the area elevated comfortably.       •Use assistive devices as directed. You may need to use a cane or crutches. Assistive devices help decrease pain and pressure on your leg when you walk. Ask your healthcare provider for more information about assistive devices and how to use them correctly.      •Maintain a healthy weight. Extra body weight can cause pressure and pain in your hip, knee, and ankle joints. Ask your healthcare provider how much you should weigh. Ask him to help you create a weight loss plan if you are overweight.

## 2021-08-27 NOTE — ED PROVIDER NOTE - CLINICAL SUMMARY MEDICAL DECISION MAKING FREE TEXT BOX
RLE pain, no trauma. + calf pain and medial thigh pain. Varicosities, no palpable cords. No skin changes or warmth. R/o DVT. Other considerations include MSK pain/strain / cramps, other pathology Check labs, LE doppler.

## 2021-08-27 NOTE — ED ADULT TRIAGE NOTE - ARRIVAL INFO ADDITIONAL COMMENTS
pt c/o 3 weeks of right upper leg pain.  no trauma.  ambulatory with full wt bear.  no recent travel

## 2021-08-27 NOTE — ED PROVIDER NOTE - PATIENT PORTAL LINK FT
You can access the FollowMyHealth Patient Portal offered by [facility] by registering at the following website: [email or web address]. By joining [employer]’s FollowMyHealth portal, you will also be able to view your health information using other applications (apps) compatible with our system.

## 2021-08-28 VITALS
OXYGEN SATURATION: 98 % | DIASTOLIC BLOOD PRESSURE: 96 MMHG | RESPIRATION RATE: 16 BRPM | TEMPERATURE: 98 F | SYSTOLIC BLOOD PRESSURE: 146 MMHG | HEART RATE: 83 BPM

## 2021-08-30 NOTE — PHYSICAL EXAM
[Appropriately responsive] : appropriately responsive [Alert] : alert [No Acute Distress] : no acute distress [No Lymphadenopathy] : no lymphadenopathy [Regular Rate Rhythm] : regular rate rhythm [No Murmurs] : no murmurs [Clear to Auscultation B/L] : clear to auscultation bilaterally [Soft] : soft [Non-tender] : non-tender [Non-distended] : non-distended [No HSM] : No HSM [No Lesions] : no lesions [No Mass] : no mass [Oriented x3] : oriented x3 [FreeTextEntry7] : vague abdominal pain [Examination Of The Breasts] : a normal appearance [No Masses] : no breast masses were palpable [Labia Majora] : normal [Labia Minora] : normal [Normal] : normal [Uterine Adnexae] : normal

## 2021-08-30 NOTE — DISCUSSION/SUMMARY
[FreeTextEntry1] : Philomena is here for annual exam . she has no acute issues . occasional vague abdominal pain. \par pap was done , referral for mammogram and transvaginal & trans abdominal ultrasound were given.

## 2021-09-20 NOTE — ED PROVIDER NOTE - CPE EDP RESP NORM
· Patient complains of tingling on the hand and foot occasionally during the last 3 days without any other sensory and motor deficit  She has no known history of diabetes mellitus and no recent hemoglobin A1c  · The etiology tingling in obese patient might be diabetes mellitus or vitamin deficiency  · Recommend her to follow up with her PCP for occasional tingling of the upper and lower extremity  normal...

## 2022-01-01 NOTE — ED PROVIDER NOTE - CARDIAC, MLM
PATIENT DISCHARGE EDUCATION INSTRUCTION SHEET  REASONS TO CALL YOUR PEDIATRICIAN  · Projectile or forceful vomiting for more than one feeding  · Unusual rash lasting more than 24 hours  · Very sleepy, difficult to wake up  · Bright yellow or pumpkin colored skin with extreme sleepiness  · Temperature below 97.6 or above 100.4 F rectally  · Feeding problems  · Breathing problems  · Excessive crying with no known cause  · If cord starts to become red, swollen, develops a smell or discharge  · No wet diaper or stool in a 24 hour time period   SAFE SLEEP POSITIONING FOR YOUR BABY  The American Academy for Pediatrics advises your baby should be placed on his/her back for  Sleeping to reduce the risk of Sudden Infant Death Syndrome (SIDS)  · Baby should sleep by themselves in a crib, portable crib or bassinet  · Baby should not share a bed with his/her parents  · Baby should be placed on his or her back to sleep, night time and at naps  · Baby should sleep on firm mattress with a tightly fitted sheet  · NO couches, waterbeds or anything soft  · Baby's sleep area should not contain any loose blankets, comforters, stuffed animals or any other soft items, (pillows, bumper pads, etc. ...)  · Baby's face should be kept uncovered at all times  · Baby should sleep in a smoke-free environment  · Do not dress baby too warmly to prevent overheating  HAND WASHING  All family and friends should wash their hands:  · Before and after holding the baby  · Before feeding the baby  · After using the restroom or changing the baby's diaper  TAKING BABY'S TEMPERATURE   · If you feel your baby may have a fever take your baby's temperature per thermometer instructions  · If taking axillary temperature place thermometer under baby's armpit and hold arm close to body  · The most precise and accurate way to take a temperature is rectally  · Turn on the digital thermometer and lubricate the tip of the thermometer with petroleum  jelly.  · Lay your baby or child on his or her back, lift his or her thighs, and insert the lubricated thermometer 1/2 to 1 inch (1.3 to 2.5 centimeters) into the rectum  · Call your Pediatrician for temperature lower than 97.6 or greater than 100.4 F rectally  BATHE AND SHAMPOO BABY  · Gently wash baby with a soft cloth using warm water and mild soap - rinse well  · Do not put baby in tub bath until umbilical cord falls off and appears well-healed  · Bathing baby 2-3 times a week might be enough until your baby becomes more mobile. Bathing your baby too much can dry out his or her skin   NAIL CARE  · First recommendation is to keep them covered to prevent facial scratching  · During the first few weeks,  nails are very soft. Doctors recommend using only a fine emery board. Don't bite or tear your baby's nails. When your baby's nails are stronger, after a few weeks, you can switch to clippers or scissors making sure not to cut too short and nip the quick   · A good time for nail care is while your baby is sleeping and moving less   CORD CARE  · Fold diaper below umbilical cord until cord falls off  · Keep umbilical cord clean and dry  · May see a small amount of crust around the base of the cord. Clean off with mild soap and water and dry     DIAPER AND DRESS BABY  · For baby girls: gently wipe from front to back. Mucous or pink tinged drainage is normal  · Dress baby in one more layer of clothing than you are wearing  · Use a hat to protect from sun or cold. NO ties or drawstrings  URINATION AND BOWEL MOVEMENTS  · If formula feeding or when breast milk feeding is established, your baby should wet 6-8 diapers a day and have at least 2 bowel movements a day during the first month  · Bowel movements color and type can vary from day to day  INFANT FEEDING  · Most newborns feed 8-12 times, every 24 hours. YOU MAY NEED TO WAKE YOUR BABY UP TO FEED  · If breastfeeding, offer both breasts when your baby is showing  feeding cues, such as rooting or bringing hand to mouth and sucking  · Common for  babies to feed every 1-3 hours   · Only allow baby to sleep up to 4 hours in between feeds if baby is feeding well at each feed. Offer breast anytime baby is showing feeding cues and at least every 3 hours  · Follow up with outpatient [facility] for continued breast feeding support  FORMULA FEEDING  · Feed baby formula every 2-3 hours when your baby is showing feeding cues  · Paced bottle feeding will help baby not over eat at each feed   BOTTLE FEEDING   · Paced Bottle Feeding is a method of bottle feeding that allows the infant to be more in control of the feeding pace. This feeding method slows down the flow of milk into the nipple and the mouth, allowing the baby to eat more slowly, and take breaks. Paced feeding reduces the risk of overfeeding that may result in discomfort for the baby   · Hold baby almost upright or slightly reclined position supporting the head and neck  · Use a small nipple for slow-flowing. Slow flow nipple holes help in controlling flow   · Don't force the bottle's nipple into your baby's mouth. Tickle babies lip so baby opens their mouth  · Insert nipple and hold the bottle flat  · Let the baby suck three to four times without milk then tip the bottle just enough to fill the nipple about jail with milk  · Let baby suck 3-5 continuous swallows, about 20-30 seconds tip the bottle down to give the baby a break  · After a few seconds, when the baby begins to suck again, tip bottle up to allow milk to flow into the nipple  · Continue to Pace feed until baby shows signs of fullness; no longer sucking after a break, turning away or pushing away the nipple   · Bottle propping is not a recommended practice for feeding  · Bottle propping is when you give a baby a bottle by leaning the bottle against a pillow, or other support, rather than holding the baby and the bottle.  · Forces your baby to  "keep up with the flow, even if the baby is full   · This can increase your baby's risk of choking, ear infections, and tooth decay  BOTTLE PREPARATION   · Never feed  formula to your baby, or use formula if the container is dented  · When using ready-to-feed, shake formula containers before opening  · If formula is in a can, clean the lid of any dust, and be sure the can opener is clean  · Formula does not need to be warmed. If you choose to feed warmed formula, do not microwave it. This can cause \"hot spots\" that could burn your baby. Instead, set the filled bottle in a bowl of warm (not boiling) water or hold the bottle under warm tap water. Sprinkle a few drops of formula on the inside of your wrist to make sure it's not too hot  · Measure and pour desired amount of water into baby bottle  · Add unpacked, level scoop(s) of powder to the bottle as directed on formula container. Return dry scoop to can  · Put the cap on the bottle and shake. Move your wrist in a twisting motion helps powder formula mix more quickly and more thoroughly  · Feed or store immediately in refrigerator  · You need to sterilize bottles, nipples, rings, etc., only before the first use  CLEANING BOTTLE  · Use hot, soapy water  · Rinse the bottles and attachments separately and clean with a bottle brush  · If your bottles are labelled  safe, you can alternatively go ahead and wash them in the    · After washing, rinse the bottle parts thoroughly in hot running water to remove any bubbles or soap residue   · Place the parts on a bottle drying rack   · Make sure the bottles are left to drain in a well-ventilated location to ensure that they dry thoroughly  CAR SEAT  For your baby's safety and to comply with [place] State Law you will need to bring a car seat to the hospital before taking your baby home. Please read your car seat instructions before your baby's discharge from the hospital.  · Make sure you place an " emergency contact sticker on your baby's car seat with your baby's identifying information  · Car seat should not be placed in the front seat of a vehicle. The car seat should be placed in the back seat in the rear-facing position.  · Car seat information is available through [employer] at [phone] and also at [email or web address]         Normal rate, regular rhythm.  Heart sounds S1, S2.  No murmurs, rubs or gallops.

## 2022-01-02 ENCOUNTER — EMERGENCY (EMERGENCY)
Facility: HOSPITAL | Age: 47
LOS: 1 days | Discharge: HOME | End: 2022-01-02
Attending: EMERGENCY MEDICINE | Admitting: EMERGENCY MEDICINE
Payer: MEDICAID

## 2022-01-02 VITALS
OXYGEN SATURATION: 96 % | RESPIRATION RATE: 18 BRPM | HEART RATE: 96 BPM | SYSTOLIC BLOOD PRESSURE: 154 MMHG | HEIGHT: 65 IN | DIASTOLIC BLOOD PRESSURE: 89 MMHG | TEMPERATURE: 99 F | WEIGHT: 244.93 LBS

## 2022-01-02 DIAGNOSIS — R06.02 SHORTNESS OF BREATH: ICD-10-CM

## 2022-01-02 DIAGNOSIS — Z87.891 PERSONAL HISTORY OF NICOTINE DEPENDENCE: ICD-10-CM

## 2022-01-02 DIAGNOSIS — Z20.822 CONTACT WITH AND (SUSPECTED) EXPOSURE TO COVID-19: ICD-10-CM

## 2022-01-02 DIAGNOSIS — Z90.49 ACQUIRED ABSENCE OF OTHER SPECIFIED PARTS OF DIGESTIVE TRACT: Chronic | ICD-10-CM

## 2022-01-02 DIAGNOSIS — M54.9 DORSALGIA, UNSPECIFIED: ICD-10-CM

## 2022-01-02 DIAGNOSIS — K21.9 GASTRO-ESOPHAGEAL REFLUX DISEASE WITHOUT ESOPHAGITIS: ICD-10-CM

## 2022-01-02 DIAGNOSIS — J45.909 UNSPECIFIED ASTHMA, UNCOMPLICATED: ICD-10-CM

## 2022-01-02 DIAGNOSIS — Z88.0 ALLERGY STATUS TO PENICILLIN: ICD-10-CM

## 2022-01-02 DIAGNOSIS — E11.9 TYPE 2 DIABETES MELLITUS WITHOUT COMPLICATIONS: ICD-10-CM

## 2022-01-02 LAB
ALBUMIN SERPL ELPH-MCNC: 4.4 G/DL — SIGNIFICANT CHANGE UP (ref 3.3–5)
ALP SERPL-CCNC: 80 U/L — SIGNIFICANT CHANGE UP (ref 40–120)
ALT FLD-CCNC: 21 U/L — SIGNIFICANT CHANGE UP (ref 10–45)
ANION GAP SERPL CALC-SCNC: 8 MMOL/L — SIGNIFICANT CHANGE UP (ref 5–17)
AST SERPL-CCNC: 19 U/L — SIGNIFICANT CHANGE UP (ref 10–40)
BASOPHILS # BLD AUTO: 0.01 K/UL — SIGNIFICANT CHANGE UP (ref 0–0.2)
BASOPHILS NFR BLD AUTO: 0.2 % — SIGNIFICANT CHANGE UP (ref 0–2)
BILIRUB SERPL-MCNC: 0.2 MG/DL — SIGNIFICANT CHANGE UP (ref 0.2–1.2)
BUN SERPL-MCNC: 6 MG/DL — LOW (ref 7–23)
CALCIUM SERPL-MCNC: 9.2 MG/DL — SIGNIFICANT CHANGE UP (ref 8.4–10.5)
CHLORIDE SERPL-SCNC: 104 MMOL/L — SIGNIFICANT CHANGE UP (ref 96–108)
CO2 SERPL-SCNC: 29 MMOL/L — SIGNIFICANT CHANGE UP (ref 22–31)
CREAT SERPL-MCNC: 0.57 MG/DL — SIGNIFICANT CHANGE UP (ref 0.5–1.3)
EOSINOPHIL # BLD AUTO: 0.14 K/UL — SIGNIFICANT CHANGE UP (ref 0–0.5)
EOSINOPHIL NFR BLD AUTO: 2.2 % — SIGNIFICANT CHANGE UP (ref 0–6)
GLUCOSE SERPL-MCNC: 168 MG/DL — HIGH (ref 70–99)
HCT VFR BLD CALC: 38.7 % — SIGNIFICANT CHANGE UP (ref 34.5–45)
HGB BLD-MCNC: 12.3 G/DL — SIGNIFICANT CHANGE UP (ref 11.5–15.5)
IMM GRANULOCYTES NFR BLD AUTO: 0.2 % — SIGNIFICANT CHANGE UP (ref 0–1.5)
LYMPHOCYTES # BLD AUTO: 2.45 K/UL — SIGNIFICANT CHANGE UP (ref 1–3.3)
LYMPHOCYTES # BLD AUTO: 38.3 % — SIGNIFICANT CHANGE UP (ref 13–44)
MCHC RBC-ENTMCNC: 28.1 PG — SIGNIFICANT CHANGE UP (ref 27–34)
MCHC RBC-ENTMCNC: 31.8 GM/DL — LOW (ref 32–36)
MCV RBC AUTO: 88.4 FL — SIGNIFICANT CHANGE UP (ref 80–100)
MONOCYTES # BLD AUTO: 0.48 K/UL — SIGNIFICANT CHANGE UP (ref 0–0.9)
MONOCYTES NFR BLD AUTO: 7.5 % — SIGNIFICANT CHANGE UP (ref 2–14)
NEUTROPHILS # BLD AUTO: 3.31 K/UL — SIGNIFICANT CHANGE UP (ref 1.8–7.4)
NEUTROPHILS NFR BLD AUTO: 51.6 % — SIGNIFICANT CHANGE UP (ref 43–77)
NRBC # BLD: 0 /100 WBCS — SIGNIFICANT CHANGE UP (ref 0–0)
PLATELET # BLD AUTO: 287 K/UL — SIGNIFICANT CHANGE UP (ref 150–400)
POTASSIUM SERPL-MCNC: 4.8 MMOL/L — SIGNIFICANT CHANGE UP (ref 3.5–5.3)
POTASSIUM SERPL-SCNC: 4.8 MMOL/L — SIGNIFICANT CHANGE UP (ref 3.5–5.3)
PROT SERPL-MCNC: 8.2 G/DL — SIGNIFICANT CHANGE UP (ref 6–8.3)
RBC # BLD: 4.38 M/UL — SIGNIFICANT CHANGE UP (ref 3.8–5.2)
RBC # FLD: 13.2 % — SIGNIFICANT CHANGE UP (ref 10.3–14.5)
SARS-COV-2 RNA SPEC QL NAA+PROBE: SIGNIFICANT CHANGE UP
SODIUM SERPL-SCNC: 141 MMOL/L — SIGNIFICANT CHANGE UP (ref 135–145)
TROPONIN T SERPL-MCNC: 0.01 NG/ML — SIGNIFICANT CHANGE UP (ref 0–0.01)
WBC # BLD: 6.4 K/UL — SIGNIFICANT CHANGE UP (ref 3.8–10.5)
WBC # FLD AUTO: 6.4 K/UL — SIGNIFICANT CHANGE UP (ref 3.8–10.5)

## 2022-01-02 PROCEDURE — 99285 EMERGENCY DEPT VISIT HI MDM: CPT | Mod: 25

## 2022-01-02 PROCEDURE — 85025 COMPLETE CBC W/AUTO DIFF WBC: CPT

## 2022-01-02 PROCEDURE — 99283 EMERGENCY DEPT VISIT LOW MDM: CPT | Mod: 25

## 2022-01-02 PROCEDURE — 71046 X-RAY EXAM CHEST 2 VIEWS: CPT | Mod: 26

## 2022-01-02 PROCEDURE — 93010 ELECTROCARDIOGRAM REPORT: CPT

## 2022-01-02 PROCEDURE — 71046 X-RAY EXAM CHEST 2 VIEWS: CPT

## 2022-01-02 PROCEDURE — 36415 COLL VENOUS BLD VENIPUNCTURE: CPT

## 2022-01-02 PROCEDURE — U0003: CPT

## 2022-01-02 PROCEDURE — 93005 ELECTROCARDIOGRAM TRACING: CPT

## 2022-01-02 PROCEDURE — 80053 COMPREHEN METABOLIC PANEL: CPT

## 2022-01-02 PROCEDURE — 84484 ASSAY OF TROPONIN QUANT: CPT

## 2022-01-02 PROCEDURE — U0005: CPT

## 2022-01-02 NOTE — ED PROVIDER NOTE - OBJECTIVE STATEMENT
45 y/o F  who is frequently around fumes with PMHx GERD, asthma, and DM on Trulicity and FHx heart disease presents to ED with c/o SOB and back pain that radiates to her chest. Pt describes the pain as more uncomfortable than painful. Her chest is more painful to the touch than with deep breathing. Denies history of DVT or family history of clotting disorder.

## 2022-01-02 NOTE — ED PROVIDER NOTE - CLINICAL SUMMARY MEDICAL DECISION MAKING FREE TEXT BOX
47 y/o F presents to ED with c/o SOB and back pain radiating to her chest. Plan for labs, EKG, CXR, and reassess.

## 2022-01-02 NOTE — ED ADULT NURSE NOTE - CAS TRG GENERAL NORM CIRC DET
Strong peripheral pulses

## 2022-01-02 NOTE — ED ADULT NURSE NOTE - NSICDXPASTMEDICALHX_GEN_ALL_CORE_FT
PAST MEDICAL HISTORY:  GERD (gastroesophageal reflux disease)     H. pylori infection     HLD (hyperlipidemia)     Migraines     Type 2 diabetes mellitus without complication, without long-term current use of insulin

## 2022-01-02 NOTE — ED ADULT NURSE NOTE - OBJECTIVE STATEMENT
Patient c/o of 1 week chest and back pain, mild SOB, no cough, no fever or chills, states may be due to chemical exposure at work.  No difficulty speaking in long sentences.  PMHx  HLD, DM, migraine headache.

## 2022-01-02 NOTE — ED PROVIDER NOTE - PATIENT PORTAL LINK FT
You can access the FollowMyHealth Patient Portal offered by [facility] by registering at the following website: [email or web address]. By joining [employer]’s FollowMyHealth portal, you will also be able to view your health information using other applications (apps) compatible with our system.

## 2022-01-02 NOTE — ED ADULT TRIAGE NOTE - CHIEF COMPLAINT QUOTE
CP and SOB x 3 days. 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 no fevers, chills, cough, sore throat. CP and SOB x 3 days. 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 no fevers, chills, cough, sore throat.

## 2022-01-02 NOTE — ED ADULT NURSE NOTE - CHIEF COMPLAINT QUOTE
CP and SOB x 3 days. 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 no fevers, chills, cough, sore throat.

## 2022-01-02 NOTE — ED ADULT NURSE REASSESSMENT NOTE - NS ED NURSE REASSESS COMMENT FT1
Patient /aoX3, c/o of chest pain, back pain and SOB, no difficulty speaking in long sentences.  Bilateral lungs CTA, no wheezing, rales noted.  NSR on EKG ,Vital signs stable.  Labs sent.  Xray done.  Results and disposition pending.

## 2022-05-23 NOTE — ED ADULT NURSE NOTE - CHPI ED NUR HIGHEST TEMPERATURE
Tresiba 19 units nightly  Novolog Base of 3 units if blood glucose is <200, Base of  6 units if blood glucose is >200, 5 or less minutes prior to eating, plus   150-200 equals 1 unit   201-250 equals 2 units   251-300 equals 3 units   301-350 equals 4 units   351-400 equals 5 units   401-450 equals 6 units   Greater than 450 equals 7 units.  
fahrenheit/unknown at home

## 2022-06-12 NOTE — ED ADULT TRIAGE NOTE - BANDS:
EMERGENCY DEPARTMENT ENCOUNTER   ATTENDING ATTESTATION     Pt Name: Shiloh Shetty  MRN: [id number]  [place] 1949  Date of evaluation: 6/12/22       Shiloh Shetty is a 67 y.o. female who presents with Shortness of Breath      MDM:   Presents with shortness of breath and bradycardia  Heart rate was in high 20s and low 30s on arrival  Patient placed on defibrillator monitor  Did respond to 1 mg of IV atropine which increased rate into the 46s  Was discharged yesterday from [facility] for JOY  She is on amiodarone, metoprolol twice a day, Bumex, Eliquis      Patient's heart rate did improve prove after him atropine. Her heart rate however since then has been considerably variable from the high 20s into the 50s. It did improve after calcium, insulin, dextrose. Blood pressure has been stable with maps well over 65 consistently. Blood work concerning for JOY and hyperkalemia. Troponin mildly elevated. Initially was concern for hyperkalemia causing bradycardia however after the treatment, patient did begin having some sinus pauses. Unknown if this is a complication from patient's amiodarone, amlodipine, metoprolol, JOY, or combination of all of them. However patient then began having some sinus pauses. Higher concern now for sick sinus syndrome. Resident spoke with cardiology who recommended transfer to [facility] due to the fact the patient will likely require pacemaker. Throughout multiple evaluations both by myself, resident, and nursing staff, patient has maintained her mental state and has been acting appropriately the entire time she has been here. While she has been somewhat fatigued, she answers all questions quickly and appropriately and without hesitation. Plan for transfer to [facility] for cardiac services not available at [facility].     Vitals:   Vitals:    06/12/22 1529 06/12/22 1530 06/12/22 1531 06/12/22 1532   BP:  (!) 114/37     Pulse: (!) 40 (!) 39 (!) 37 (!) 33   Resp:  21     Temp:       TempSrc:       SpO2:  96%         PHYSICAL:   Temp: 97 °F (36.1 °C),  Heart Rate: (!) 33, Resp: 21, BP: (!) 114/37, SpO2: 96 %  Gen: ill appearing, Afebrile  Neck: Supple  Cards: Bradycardic rate, irregular rhythm  Pulm: Lung sounds clear to auscultation  Abdomen: Soft, non-tender, non-distended  Skin: warm, dry  Extremities: pulses 2+ radial / dorsalis pedis, no clubbing, cyanosis, edema  Neurologic: CAOX3, no facial asymmetry, no dysarthria or aphasia       EKG    EKG Interpretation    Interpreted by me    Rhythm: Sinus bradycardia  Rate: Bradycardic, 40  Axis: Normal to leftward  Ectopy: none  Conduction: normal  ST Segments: no acute change  T Waves: no acute change  Q Waves: none    Clinical Impression: Sinus bradycardia rate of 40. No ST segment changes, no other arrhythmia, no ectopy. Normal to leftward axis, poor wave progression. EKG Interpretation    Interpreted by me at 1757    Rhythm: Sinus bradycardia  Rate: Bradycardic, 54  Axis: normal  Ectopy: none  Conduction: normal  ST Segments: no acute change  T Waves: no acute change  Q Waves: none    Clinical Impression: Sinus bradycardia rate of 54. No ST segment changes, no other arrhythmia, no ectopy. Normal axis, poor R wave progression. EKG Interpretation    Interpreted by me, 1758    Rhythm: Bradycardia  Rate: Bradycardic, 41  Axis: normal  Ectopy: none  Conduction: Sinus pause  ST Segments: no acute change  T Waves: no acute change  Q Waves: none    Clinical Impression: Bradycardic rhythm, rate of 41. No ST segment changes, no other arrhythmia, no ectopy. Normal axis, poor wave progression. Marked sinus pause present. All EKG's are interpreted by the Emergency Department Physician whoeither signs or Co-signs this chart in the absence of a cardiologist.    I personally saw and examined the patient.  I have reviewed and agree with the resident's findings, including all diagnostic interpretations and treatment plan as written. I was present for the key portions of any procedures performed and the inclusive time noted for any critical care statement. There was a high probability of clinically significant/life threatening deterioration in this patient's condition which required my urgent intervention. Total critical care time was 45 minutes. This excludes any time for separately reportable procedures. ED Course as of 06/12/22 1958   [place] Jun 12, 2022   1738 Patient's heart rate is improved since administration of the calcium gluconate and insulin dextrose. Patient's blood pressure appears approximately 144/50 with a MAP of 72. [CR]      ED Course User Index  [CR] Lucretia Montes DO       The care is provided during an unprecedented national emergency due to the novel coronavirus, COVID 19.   Tk Bolden DO  Attending Emergency Physician          Tk Bolden DO  06/12/22 2003 Allergy;

## 2022-06-23 NOTE — ED ADULT NURSE NOTE - DISCHARGE DATE/TIME
18 y/o female presents to the ED with abdominal pain worsening over past few days. patient states pain worse after eating. patient with appt with GI in July. patient denies any vaginal bleeding or discharge. no dysuria or gross hematuria. patient c/o pain with ambulating. no back pain. patient c/o gnawing pain non radiating. no vomiting or diarrhea. no rashes. no cough or chest pain. no recent antibx or sick contact. patient with low grade fever last night. 08-Jul-2021 17:13

## 2022-07-12 NOTE — ED PROVIDER NOTE - WEIGHT BEARING
Patient seen in preop.  Denies new medical history since he was last seen.  The hernia continues to be symptomatic, especially at night.  All questions have been answered regarding procedure.  Consent obtained.  To the OR for right inguinal hernia repair.    Elyse Murrell DO  General Surgeon  [facility]  Surgery Clinic - [address]  [place] [address]?  Office: [phone]  Employed by - [facility]    
able

## 2022-09-20 ENCOUNTER — EMERGENCY (EMERGENCY)
Facility: HOSPITAL | Age: 47
LOS: 1 days | Discharge: HOME | End: 2022-09-20
Attending: EMERGENCY MEDICINE | Admitting: EMERGENCY MEDICINE
Payer: MEDICAID

## 2022-09-20 VITALS
SYSTOLIC BLOOD PRESSURE: 171 MMHG | WEIGHT: 248.9 LBS | DIASTOLIC BLOOD PRESSURE: 95 MMHG | HEIGHT: 65 IN | HEART RATE: 83 BPM | RESPIRATION RATE: 18 BRPM | OXYGEN SATURATION: 99 % | TEMPERATURE: 98 F

## 2022-09-20 DIAGNOSIS — Z90.49 ACQUIRED ABSENCE OF OTHER SPECIFIED PARTS OF DIGESTIVE TRACT: Chronic | ICD-10-CM

## 2022-09-20 LAB
ALBUMIN SERPL ELPH-MCNC: 4.2 G/DL — SIGNIFICANT CHANGE UP (ref 3.3–5)
ALP SERPL-CCNC: 82 U/L — SIGNIFICANT CHANGE UP (ref 40–120)
ALT FLD-CCNC: 18 U/L — SIGNIFICANT CHANGE UP (ref 10–45)
ANION GAP SERPL CALC-SCNC: 12 MMOL/L — SIGNIFICANT CHANGE UP (ref 5–17)
APTT BLD: 31.4 SEC — SIGNIFICANT CHANGE UP (ref 27.5–35.5)
AST SERPL-CCNC: 16 U/L — SIGNIFICANT CHANGE UP (ref 10–40)
BASOPHILS # BLD AUTO: 0.01 K/UL — SIGNIFICANT CHANGE UP (ref 0–0.2)
BASOPHILS NFR BLD AUTO: 0.1 % — SIGNIFICANT CHANGE UP (ref 0–2)
BILIRUB SERPL-MCNC: 0.2 MG/DL — SIGNIFICANT CHANGE UP (ref 0.2–1.2)
BUN SERPL-MCNC: 8 MG/DL — SIGNIFICANT CHANGE UP (ref 7–23)
CALCIUM SERPL-MCNC: 9.5 MG/DL — SIGNIFICANT CHANGE UP (ref 8.4–10.5)
CHLORIDE SERPL-SCNC: 98 MMOL/L — SIGNIFICANT CHANGE UP (ref 96–108)
CK MB CFR SERPL CALC: 1.1 NG/ML — SIGNIFICANT CHANGE UP (ref 0–6.7)
CK SERPL-CCNC: 70 U/L — SIGNIFICANT CHANGE UP (ref 25–170)
CO2 SERPL-SCNC: 27 MMOL/L — SIGNIFICANT CHANGE UP (ref 22–31)
CREAT SERPL-MCNC: 0.65 MG/DL — SIGNIFICANT CHANGE UP (ref 0.5–1.3)
EGFR: 109 ML/MIN/1.73M2 — SIGNIFICANT CHANGE UP
EOSINOPHIL # BLD AUTO: 0.12 K/UL — SIGNIFICANT CHANGE UP (ref 0–0.5)
EOSINOPHIL NFR BLD AUTO: 1.7 % — SIGNIFICANT CHANGE UP (ref 0–6)
GLUCOSE SERPL-MCNC: 231 MG/DL — HIGH (ref 70–99)
HCG SERPL-ACNC: <0 MIU/ML — SIGNIFICANT CHANGE UP
HCT VFR BLD CALC: 38.9 % — SIGNIFICANT CHANGE UP (ref 34.5–45)
HGB BLD-MCNC: 12.9 G/DL — SIGNIFICANT CHANGE UP (ref 11.5–15.5)
IMM GRANULOCYTES NFR BLD AUTO: 0.3 % — SIGNIFICANT CHANGE UP (ref 0–0.9)
INR BLD: 1.02 — SIGNIFICANT CHANGE UP (ref 0.88–1.16)
LYMPHOCYTES # BLD AUTO: 3.19 K/UL — SIGNIFICANT CHANGE UP (ref 1–3.3)
LYMPHOCYTES # BLD AUTO: 44.8 % — HIGH (ref 13–44)
MCHC RBC-ENTMCNC: 28.7 PG — SIGNIFICANT CHANGE UP (ref 27–34)
MCHC RBC-ENTMCNC: 33.2 GM/DL — SIGNIFICANT CHANGE UP (ref 32–36)
MCV RBC AUTO: 86.6 FL — SIGNIFICANT CHANGE UP (ref 80–100)
MONOCYTES # BLD AUTO: 0.55 K/UL — SIGNIFICANT CHANGE UP (ref 0–0.9)
MONOCYTES NFR BLD AUTO: 7.7 % — SIGNIFICANT CHANGE UP (ref 2–14)
NEUTROPHILS # BLD AUTO: 3.23 K/UL — SIGNIFICANT CHANGE UP (ref 1.8–7.4)
NEUTROPHILS NFR BLD AUTO: 45.4 % — SIGNIFICANT CHANGE UP (ref 43–77)
NRBC # BLD: 0 /100 WBCS — SIGNIFICANT CHANGE UP (ref 0–0)
PLATELET # BLD AUTO: 259 K/UL — SIGNIFICANT CHANGE UP (ref 150–400)
POTASSIUM SERPL-MCNC: 4 MMOL/L — SIGNIFICANT CHANGE UP (ref 3.5–5.3)
POTASSIUM SERPL-SCNC: 4 MMOL/L — SIGNIFICANT CHANGE UP (ref 3.5–5.3)
PROT SERPL-MCNC: 8 G/DL — SIGNIFICANT CHANGE UP (ref 6–8.3)
PROTHROM AB SERPL-ACNC: 12.2 SEC — SIGNIFICANT CHANGE UP (ref 10.5–13.4)
RBC # BLD: 4.49 M/UL — SIGNIFICANT CHANGE UP (ref 3.8–5.2)
RBC # FLD: 12.3 % — SIGNIFICANT CHANGE UP (ref 10.3–14.5)
SARS-COV-2 RNA SPEC QL NAA+PROBE: NEGATIVE — SIGNIFICANT CHANGE UP
SODIUM SERPL-SCNC: 137 MMOL/L — SIGNIFICANT CHANGE UP (ref 135–145)
TROPONIN T SERPL-MCNC: 0.01 NG/ML — SIGNIFICANT CHANGE UP (ref 0–0.01)
WBC # BLD: 7.12 K/UL — SIGNIFICANT CHANGE UP (ref 3.8–10.5)
WBC # FLD AUTO: 7.12 K/UL — SIGNIFICANT CHANGE UP (ref 3.8–10.5)

## 2022-09-20 PROCEDURE — 80053 COMPREHEN METABOLIC PANEL: CPT

## 2022-09-20 PROCEDURE — 70450 CT HEAD/BRAIN W/O DYE: CPT | Mod: MA

## 2022-09-20 PROCEDURE — 82553 CREATINE MB FRACTION: CPT

## 2022-09-20 PROCEDURE — 0042T: CPT | Mod: MA

## 2022-09-20 PROCEDURE — 70498 CT ANGIOGRAPHY NECK: CPT | Mod: 26,MA

## 2022-09-20 PROCEDURE — 70498 CT ANGIOGRAPHY NECK: CPT | Mod: MA

## 2022-09-20 PROCEDURE — 85025 COMPLETE CBC W/AUTO DIFF WBC: CPT

## 2022-09-20 PROCEDURE — 82550 ASSAY OF CK (CPK): CPT

## 2022-09-20 PROCEDURE — 84484 ASSAY OF TROPONIN QUANT: CPT

## 2022-09-20 PROCEDURE — 84702 CHORIONIC GONADOTROPIN TEST: CPT

## 2022-09-20 PROCEDURE — 99284 EMERGENCY DEPT VISIT MOD MDM: CPT

## 2022-09-20 PROCEDURE — 99291 CRITICAL CARE FIRST HOUR: CPT | Mod: 25

## 2022-09-20 PROCEDURE — 36415 COLL VENOUS BLD VENIPUNCTURE: CPT

## 2022-09-20 PROCEDURE — 85610 PROTHROMBIN TIME: CPT

## 2022-09-20 PROCEDURE — 99291 CRITICAL CARE FIRST HOUR: CPT

## 2022-09-20 PROCEDURE — 85730 THROMBOPLASTIN TIME PARTIAL: CPT

## 2022-09-20 PROCEDURE — 70496 CT ANGIOGRAPHY HEAD: CPT | Mod: 26,MA

## 2022-09-20 PROCEDURE — 70496 CT ANGIOGRAPHY HEAD: CPT | Mod: MA

## 2022-09-20 PROCEDURE — 82962 GLUCOSE BLOOD TEST: CPT

## 2022-09-20 PROCEDURE — 87635 SARS-COV-2 COVID-19 AMP PRB: CPT

## 2022-09-20 RX ORDER — METOCLOPRAMIDE HCL 10 MG
10 TABLET ORAL ONCE
Refills: 0 | Status: DISCONTINUED | OUTPATIENT
Start: 2022-09-20 | End: 2022-09-20

## 2022-09-20 RX ORDER — METOCLOPRAMIDE HCL 10 MG
10 TABLET ORAL ONCE
Refills: 0 | Status: COMPLETED | OUTPATIENT
Start: 2022-09-20 | End: 2022-09-20

## 2022-09-20 RX ORDER — SODIUM CHLORIDE 9 MG/ML
1000 INJECTION INTRAMUSCULAR; INTRAVENOUS; SUBCUTANEOUS ONCE
Refills: 0 | Status: COMPLETED | OUTPATIENT
Start: 2022-09-20 | End: 2022-09-20

## 2022-09-20 RX ORDER — ACETAMINOPHEN 500 MG
650 TABLET ORAL ONCE
Refills: 0 | Status: COMPLETED | OUTPATIENT
Start: 2022-09-20 | End: 2022-09-20

## 2022-09-20 RX ADMIN — SODIUM CHLORIDE 1000 MILLILITER(S): 9 INJECTION INTRAMUSCULAR; INTRAVENOUS; SUBCUTANEOUS at 21:13

## 2022-09-20 NOTE — ED PROVIDER NOTE - NSFOLLOWUPINSTRUCTIONS_ED_ALL_ED_FT
Can take tylenol 650mg or motrin 600mg (May cause stomach irritation - take with food and avoid prolonged use) every 6hrs as needed for pain.    Stay well hydrated.    Return for fevers, persistent vomit, uncontrolled pain, worsening breathing, worsening lightheaded, focal weakness.    Follow up with primary doctor within 1-2 days.     Follow up with neurology within 1 week. Can call [phone] to schedule appointment.    Can take multivitamin (one daily) if you are not eating a balanced diet.     Your doctor may need to make medication changes if your blood pressure continues to be high.    Follow up with primary doctor within 1-2 days.     Hypertension    Hypertension, commonly called high blood pressure, is when the force of blood pumping through your arteries is too strong. Hypertension forces your heart to work harder to pump blood. Your arteries may become narrow or stiff. Having untreated or uncontrolled hypertension for a long period of time can cause heart attack, stroke, kidney disease, and other problems. If started on a medication, take exactly as prescribed by your health care professional. Maintain a healthy lifestyle and follow up with your primary care physician.    SEEK IMMEDIATE MEDICAL CARE IF YOU HAVE ANY OF THE FOLLOWING SYMPTOMS: severe headache, confusion, chest pain, abdominal pain, vomiting, or shortness of breath.     Paresthesia    Paresthesia is an abnormal burning or prickling sensation. It is usually felt in the hands, arms, legs, or feet. However, it may occur in any part of the body. Usually, paresthesia is not painful. It may feel like:  Tingling or numbness.  Pins and needles.  Skin crawling.  Buzzing.  Arms or legs falling asleep.  Itching.  Paresthesia may occur without any clear cause, or it may be caused by:  Breathing too quickly (hyperventilation).  Pressure on a nerve.  An underlying medical condition.  Side effects of a medication.  Nutritional deficiencies.  Exposure to toxic chemicals.  Most people experience temporary (transient) paresthesia at some time in their lives. For some people, it may be long-lasting (chronic) because of an underlying medical condition. If you have paresthesia that lasts a long time, you may need to be evaluated by your health care provider.    Follow these instructions at home:    Alcohol use   Do not drink alcohol if:  Your health care provider tells you not to drink.  You are pregnant, may be pregnant, or are planning to become pregnant.  If you drink alcohol, limit how much you have:  0–1 drink a day for women.  0–2 drinks a day for men.  Be aware of how much alcohol is in your drink. In the [place] one drink equals one typical bottle of beer (12 oz), one-half glass of wine (5 oz), or one shot of hard liquor (1½ oz).  Nutrition     Eat a healthy diet. This includes:  Eating foods that are high in fiber, such as fresh fruits and vegetables, whole grains, and beans.  Limiting foods that are high in fat and processed sugars, such as fried or sweet foods.    General instructions     Take over-the-counter and prescription medicines only as told by your health care provider.  Do not use any products that contain nicotine or tobacco, such as cigarettes and e-cigarettes. These can keep blood from reaching damaged nerves. If you need help quitting, ask your health care provider.  If you have diabetes, work closely with your health care provider to keep your blood sugar under control.  If you have numbness in your feet:  Check every day for signs of injury or infection. Watch for redness, warmth, and swelling.  Wear padded socks and comfortable shoes. These help protect your feet.  Keep all follow-up visits as told by your health care provider. This is important.    Contact a health care provider if you:  Have paresthesia that gets worse or does not go away.  Have a burning or prickling feeling that gets worse when you walk.  Have pain, cramps, or dizziness.  Develop a rash.  Get help right away if you:  Feel weak.  Have trouble walking or moving.  Have problems with speech, understanding, or vision.  Feel confused.  Cannot control your bladder or bowel movements.  Have numbness after an injury.  Develop new weakness in an arm or leg.  Faint.

## 2022-09-20 NOTE — CONSULT NOTE ADULT - ASSESSMENT
47y Female with PMHx of DM presents with intermittent episodic b/l LE and UE numbness and paresthesia starting around 7pm. Since starting exercising a few week ago, patient has had cramping with numbness/tingling after working out. Does not hydrate week. Due to recurrent symptoms with involvement of all four extremities, patient decided to come to the ED. Symptoms intermittent throughout affecting b/l arms and legs, not lateralizing. CT imaging with no evidence of stroke nor steno-occlusive disease.    - no further stroke w/u needed  - encourage PO hydration   - stroke education provided  - pt can f/u with PCP (has appt scheduled already)    Case discussed with Neurology Attending Dr. Dixon

## 2022-09-20 NOTE — ED PROVIDER NOTE - CLINICAL SUMMARY MEDICAL DECISION MAKING FREE TEXT BOX
47F PMH GERD, asthma, DM p/w numbness. Pt states that over last few weeks she has numbness to LLE which she attributed to working out. ~1hr PTA pt developed weakness/numbness to entire LUE. No hx of similar prior. Very mild frontal HA, gradual onset, similar to prior. Pt also states that she feels dizziness for many months, no recent changes. No other systemic symptoms.   Pt denies hx HTN but prior ED visits w/ triage BP 150s/90s, also prior cards note indicates diagnosis of HTN.  Mild HTN, other vitals wnl. Exam as above.  ddx: Low suspicion CVA, possible radiculopathy, possible HA related, possible metabolic component.   Code stroke activated.   CTs, labs, IVF/symptom control, reassess.

## 2022-09-20 NOTE — ED PROVIDER NOTE - OBJECTIVE STATEMENT
47F PMH GERD, asthma, DM p/w numbness. Pt states that over last few weeks she has numbness to LLE which she attributed to working out. ~1hr PTA pt developed weakness/numbness to entire LUE. No hx of similar prior. Very mild frontal HA, gradual onset, similar to prior. Pt also states that she feels dizziness for many months, no recent changes. No other systemic symptoms.   Pt denies hx HTN but prior ED visits w/ triage BP 150s/90s, also prior cards note indicates diagnosis of HTN.  Denies vision changes, other focal weakness/numbness, body aches, neck pain, rashes, tinnitus, hearing changes, URI symptoms, f/c, SOB/CP, NVD, abd pain, urinary complaints, black/bloody stool, lifestyle/dietary/medication changes.

## 2022-09-20 NOTE — CONSULT NOTE ADULT - SUBJECTIVE AND OBJECTIVE BOX
**STROKE CODE CONSULT NOTE**    Last known well time/Time of onset of symptoms: 7pm 9/20/22    HPI: 47y Female with PMHx of DM presents with intermittent episodic b/l LE and UE numbness and paresthesia starting around 7pm. Patient started working out a few weeks ago and since then, her b/l LE has had episodes of cramping and paresthesia the day afterwards. These symptoms improve when she is active/exercising, but then returns the next day after working out. Around 7pm today, her head started feeling "tight" and then developed tingling and numbness along her right arm which resolved, and then same symptoms started with her left arm, and b/l LE. On arrival to ED /95. Symptoms now intermittent, coming and going. Patient able to ambulate well. Denies CP, SOB, dizziness, slurred speech, changes in vision, weakness in arms/legs. CTH negative. CTP negative. CTA negative for steno-occlusive disease.     Patient shares that she tries her best to hydrate after working out, but she is "tired" of drinking water due to the taste and the effort that is required to get purified water/bottled water as she avoids tap water (sometimes does not bother to go to the store).     T(C): 36.9 (09-20-22 @ 20:07), Max: 36.9 (09-20-22 @ 20:07)  HR: 83 (09-20-22 @ 20:07) (83 - 83)  BP: 171/95 (09-20-22 @ 20:07) (171/95 - 171/95)  RR: 18 (09-20-22 @ 20:07) (18 - 18)  SpO2: 99% (09-20-22 @ 20:07) (99% - 99%)    PAST MEDICAL & SURGICAL HISTORY:  Type 2 diabetes mellitus without complication, without long-term current use of insulin      H. pylori infection      Migraines      HLD (hyperlipidemia)      GERD (gastroesophageal reflux disease)      S/P cholecystectomy          FAMILY HISTORY:      SOCIAL HISTORY:   Smoking status:   Drinking:   Drug Use:     ROS:   Constitutional: No fever, weight loss or fatigue  Eyes: No eye pain, visual disturbances, or discharge  ENMT:  No difficulty hearing, tinnitus; No sinus or throat pain  Neck: No pain or stiffness  Respiratory: No cough, wheezing, chills or hemoptysis  Cardiovascular: No chest pain, palpitations, shortness of breath, or leg swelling  Gastrointestinal: No abdominal pain. No nausea, vomiting or hematemesis; No diarrhea or constipation. Nohematochezia.  Genitourinary: No dysuria, frequency, hematuria or incontinence  Neurological: As per HPI      MEDICATIONS  (STANDING):    MEDICATIONS  (PRN):    Allergies    adhesives (Blisters)  penicillin (Hives)  penicillin (Rash)    Intolerances      Vital Signs Last 24 Hrs  T(C): 36.9 (20 Sep 2022 20:07), Max: 36.9 (20 Sep 2022 20:07)  T(F): 98.4 (20 Sep 2022 20:07), Max: 98.4 (20 Sep 2022 20:07)  HR: 83 (20 Sep 2022 20:07) (83 - 83)  BP: 171/95 (20 Sep 2022 20:07) (171/95 - 171/95)  BP(mean): --  RR: 18 (20 Sep 2022 20:07) (18 - 18)  SpO2: 99% (20 Sep 2022 20:07) (99% - 99%)    Parameters below as of 20 Sep 2022 20:07  Patient On (Oxygen Delivery Method): room air        Physical exam:  Constitutional: No acute distress, conversant  Eyes: Anicteric sclerae, moist conjunctivae, see below for CNs  Neck: trachea midline, FROM, supple, no thyromegaly or lymphadenopathy  GI: Abdomen soft, non-distended, non-tender  Extremities:  no edema    Neurologic:  -Mental status: Awake, alert, oriented to person, place, and time. Speech is fluent with intact naming, repetition, and comprehension, no dysarthria. Recent and remote memory intact. Follows commands. Attention/concentration intact. Fund of knowledge appropriate.  -Cranial nerves:   II: Visual fields are full to confrontation.  III, IV, VI: Extraocular movements are intact without nystagmus. Pupils equally round and reactive to light  V:  Facial sensation V1-V3 equal and intact   VII: Face is symmetric with normal eye closure and smile  VIII: Hearing is bilaterally intact   XII: Tongue protrudes midline  Motor: Normal bulk and tone. No pronator drift. Strength bilateral upper extremity 5/5, bilateral lower extremities 5/5.  Sensation: Intact to light touch bilaterally. No neglect or extinction on double simultaneous testing.  Coordination: No dysmetria on finger-to-nose bilaterally   Gait: Narrow gait and steady    NIHSS: 0 ASPECT Score: 10    Fingerstick Blood Glucose: CAPILLARY BLOOD GLUCOSE  221 (20 Sep 2022 20:50)      POCT Blood Glucose.: 221 mg/dL (20 Sep 2022 20:09)    LABS:                        12.9   7.12  )-----------( 259      ( 20 Sep 2022 20:47 )             38.9     09-20    137  |  98  |  8   ----------------------------<  231<H>  4.0   |  27  |  0.65    Ca    9.5      20 Sep 2022 20:47    TPro  8.0  /  Alb  4.2  /  TBili  0.2  /  DBili  x   /  AST  16  /  ALT  18  /  AlkPhos  82  09-20    PT/INR - ( 20 Sep 2022 20:47 )   PT: 12.2 sec;   INR: 1.02          PTT - ( 20 Sep 2022 20:47 )  PTT:31.4 sec  CARDIAC MARKERS ( 20 Sep 2022 20:47 )  x     / 0.01 ng/mL / 70 U/L / x     / 1.1 ng/mL          RADIOLOGY & ADDITIONAL STUDIES:  < from: CT Brain Stroke Protocol (09.20.22 @ 20:43) >  IMPRESSION:    No acute intracranial hemorrhage or demarcated territorial infarction.    < end of copied text >    < from: CT Brain Perfusion Maps Stroke (09.20.22 @ 20:45) >  IMPRESSION: Negative CT perfusion study.    < end of copied text >  < from: CT Angio Neck Stroke Protocol w/ IV Cont (09.20.22 @ 20:49) >  IMPRESSION: No large vessel occlusion or high-grade stenosis.    < end of copied text >  < from: CT Angio Neck Stroke Protocol w/ IV Cont (09.20.22 @ 20:49) >  IMPRESSION: No significant stenosis, dissection, or occlusion of the   cervical carotid or vertebral arteries.    < end of copied text >    -----------------------------------------------------------------------------------------------------------------  IV-tPA (Y/N):    NO                              Bolus time:    Alteplase Dose Verification w/ RN:  Reason IV-tPA not given: No focal disabling neuro deficits

## 2022-09-20 NOTE — ED ADULT NURSE NOTE - OBJECTIVE STATEMENT
Pt is 47F c/o L arm numbness starting 1 hr PTA and L leg numbness [id number]. Pt states "I recently started exercising and it all started." No facial droop, no slurred speech, no unilateral weakness, no recent changes in vision, denies headache, denies chest pain, denies dizziness. Sensation intact and equal to bilateral upper extremities. Stroke code called d/t numbness. FS done. Pt placed on portable continuous cardiac/pulse oximetry monitoring. Immediately brought to CT scan. ED attending and stroke code team at bedside.

## 2022-09-20 NOTE — ED PROVIDER NOTE - PATIENT PORTAL LINK FT
You can access the FollowMyHealth Patient Portal offered by [facility] by registering at the following website: [email or web address]. By joining [employer]’s FollowMyHealth portal, you will also be able to view your health information using other applications (apps) compatible with our system.

## 2022-09-20 NOTE — ED PROVIDER NOTE - DATE/TIME 1
pls notify pt that lumbar spine MRI showed mild degenerative changes right greater that left, no surgical indication.
20-Sep-2022 21:29

## 2022-09-20 NOTE — ED PROVIDER NOTE - PROGRESS NOTE DETAILS
[place]:   glucose 231, other labs grossly wnl. CTs w/ no acute pathology. Pt now asymptomatic. Refused reglan/tylenol. Now also stating that she has intermittent LUE numbness for a while. Has no CP. Pt handed RN a urine sample that was cool (straight from bathroom). Has no urinary symptoms. Rediscused w/ stroke team, recommending outpt f/u. Clinically not CVA/TIA. Has appt w/ PMd tomorrow.   Discussed importance of outpt follow up and return precautions. Clinically no indication for further emergent ED workup or hospitalization at this time. Stable for dc, outpt f/u.

## 2022-09-21 DIAGNOSIS — I10 ESSENTIAL (PRIMARY) HYPERTENSION: ICD-10-CM

## 2022-09-21 DIAGNOSIS — Z91.048 OTHER NONMEDICINAL SUBSTANCE ALLERGY STATUS: ICD-10-CM

## 2022-09-21 DIAGNOSIS — R20.2 PARESTHESIA OF SKIN: ICD-10-CM

## 2022-09-21 DIAGNOSIS — E11.9 TYPE 2 DIABETES MELLITUS WITHOUT COMPLICATIONS: ICD-10-CM

## 2022-09-21 DIAGNOSIS — E78.5 HYPERLIPIDEMIA, UNSPECIFIED: ICD-10-CM

## 2022-09-21 DIAGNOSIS — K21.9 GASTRO-ESOPHAGEAL REFLUX DISEASE WITHOUT ESOPHAGITIS: ICD-10-CM

## 2022-09-21 DIAGNOSIS — R20.0 ANESTHESIA OF SKIN: ICD-10-CM

## 2022-09-21 DIAGNOSIS — Z20.822 CONTACT WITH AND (SUSPECTED) EXPOSURE TO COVID-19: ICD-10-CM

## 2022-09-21 DIAGNOSIS — E66.3 OVERWEIGHT: ICD-10-CM

## 2022-09-21 DIAGNOSIS — Z88.0 ALLERGY STATUS TO PENICILLIN: ICD-10-CM

## 2022-09-21 DIAGNOSIS — J45.909 UNSPECIFIED ASTHMA, UNCOMPLICATED: ICD-10-CM

## 2022-11-11 NOTE — ED ADULT TRIAGE NOTE - SPO2 (%)
Per patient's phone she has missed a couple of calls in regards her lab results  Patient is a little anxious due to possible breast cancer  97

## 2022-12-24 NOTE — ED ADULT NURSE NOTE - PAIN: PRESENCE, MLM
Constitutional: sub fever and chills     All other ROS neg except as per HPI complains of pain/discomfort

## 2023-01-14 NOTE — ED PROVIDER NOTE - IV ALTEPLASE EXCL ABS HIDDEN
Vital Signs Last 24 Hrs  T(C): 36.4 (01-14-23 @ 08:07), Max: 36.4 (01-13-23 @ 17:19)  T(F): 97.5 (01-14-23 @ 08:07), Max: 97.6 (01-13-23 @ 17:19)  HR: --  BP: --  BP(mean): --  RR: --  SpO2: --    Orthostatic VS  01-14-23 @ 08:07  Lying BP: --/-- HR: --  Sitting BP: 130/87 HR: 70  Standing BP: 142/86 HR: 73  Site: --  Mode: --  Orthostatic VS  01-13-23 @ 08:39  Lying BP: --/-- HR: --  Sitting BP: 134/66 HR: 67  Standing BP: --/-- HR: --  Site: --  Mode: --   show

## 2023-02-22 ENCOUNTER — EMERGENCY (EMERGENCY)
Facility: HOSPITAL | Age: 48
LOS: 1 days | Discharge: HOME | End: 2023-02-22
Admitting: STUDENT IN AN ORGANIZED HEALTH CARE EDUCATION/TRAINING PROGRAM
Payer: MEDICAID

## 2023-02-22 VITALS
HEIGHT: 65 IN | OXYGEN SATURATION: 98 % | DIASTOLIC BLOOD PRESSURE: 85 MMHG | WEIGHT: 250 LBS | RESPIRATION RATE: 18 BRPM | TEMPERATURE: 98 F | HEART RATE: 90 BPM | SYSTOLIC BLOOD PRESSURE: 134 MMHG

## 2023-02-22 DIAGNOSIS — Z90.49 ACQUIRED ABSENCE OF OTHER SPECIFIED PARTS OF DIGESTIVE TRACT: Chronic | ICD-10-CM

## 2023-02-22 PROCEDURE — 99284 EMERGENCY DEPT VISIT MOD MDM: CPT

## 2023-02-22 PROCEDURE — 99283 EMERGENCY DEPT VISIT LOW MDM: CPT

## 2023-02-22 RX ORDER — IBUPROFEN 200 MG
600 TABLET ORAL ONCE
Refills: 0 | Status: COMPLETED | OUTPATIENT
Start: 2023-02-22 | End: 2023-02-22

## 2023-02-22 RX ADMIN — Medication 600 MILLIGRAM(S): at 20:37

## 2023-02-22 NOTE — ED ADULT NURSE NOTE - NSIMPLEMENTINTERV_GEN_ALL_ED
Name band;
Implemented All Universal Safety Interventions:  [place] to call system. Call bell, personal items and telephone within reach. Instruct patient to call for assistance. Room bathroom lighting operational. Non-slip footwear when patient is off stretcher. Physically safe environment: no spills, clutter or unnecessary equipment. Stretcher in lowest position, wheels locked, appropriate side rails in place.

## 2023-02-22 NOTE — ED ADULT NURSE NOTE - NSFALLRSKHARMRISK_ED_ALL_ED
PRINCIPAL DISCHARGE DIAGNOSIS  Diagnosis: Delirium  Assessment and Plan of Treatment: Seek Medical care if new behavioral problems start such as moodiness, aggressiveness, or seeing things that are not there (hallucinations).  Any new problem with brain function happens. This includes problems with balance, speech, or falling a lot.  Problems with swallowing develop.  Any symptoms of other illness happen.  Small changes or worsening in any aspect of brain function can be a sign that the illness is getting worse. It can also be a sign of another medical illness such as infection. Seeing a caregiver right away is important.  SEEK IMMEDIATE MEDICAL CARE IF:  A fever develops.  New or worsened confusion develops.  New or worsened sleepiness develops.  Staying awake becomes hard to do.        SECONDARY DISCHARGE DIAGNOSES  Diagnosis: Seizure disorder  Assessment and Plan of Treatment: Continue medications, follow up with neurology    Diagnosis: Chronic atrial fibrillation  Assessment and Plan of Treatment: Atrial fibrillation is the most common heart rhythm problem.  The condition puts you at risk for has stroke and heart attack  It helps if you control your blood pressure, not drink more than 1-2 alcohol drinks per day, cut down on caffeine, getting treatment for over active thyroid gland, and get regular exercise  Call your doctor if you feel your heart racing or beating unusually, chest tightness or pain, lightheaded, faint, shortness of breath especially with exercise  It is important to take your heart medication as prescribed      Diagnosis: Atelectasis of left lung  Assessment and Plan of Treatment: Follow up with PMD and pulmology . If possibly , lay on Right side when in bed    Diagnosis: Type 2 diabetes mellitus with hyperglycemia, with long-term current use of insulin  Assessment and Plan of Treatment: HgA1C this admission was 7  Make sure you get your HgA1c checked every three months.  If you take oral diabetes medications, check your blood glucose two times a day.  If you take insulin, check your blood glucose before meals and at bedtime.  It's important not to skip any meals.  Keep a log of your blood glucose results and always take it with you to your doctor appointments.  Keep a list of your current medications including injectables and over the counter medications and bring this medication list with you to all your doctor appointments.  If you have not seen your ophthalmologist this year call for appointment.  Check your feet daily for redness, sores, or openings. Do not self treat. If no improvement in two days call your primary care physician for an appointment.  Low blood sugar (hypoglycemia) is a blood sugar below 70mg/dl. Check your blood sugar if you feel signs/symptoms of hypoglycemia. If your blood sugar is below 70 take 15 grams of carbohydrates (ex 4 oz of apple juice, 3-4 glucose tablets, or 4-6 oz of regular soda) wait 15 minutes and repeat blood sugar to make sure it comes up above 70.  If your blood sugar is above 70 and you are due for a meal, have a meal.  If you are not due for a meal have a snack.  This snack helps keeps your blood sugar at a safe range.      Diagnosis: Hypernatremia  Assessment and Plan of Treatment: Resolved. Follow up with PMD    Diagnosis: Infected wound  Assessment and Plan of Treatment: Stable ulcerative lesion medial 1 metatarsal phalangeal joint left foot. Eschar dorsal 5th toe right foot. DP and PT non-palpable both feet . No signs of acute infection both feet. Recommend betadine and dsd daily and continue with z-float boots  Follow up with podiatry        no

## 2023-02-22 NOTE — ED PROVIDER NOTE - NSFOLLOWUPINSTRUCTIONS_ED_ALL_ED_FT
Take ibuprofen as needed for pain. Call your oral surgeon tomorrow     Toothache    WHAT YOU NEED TO KNOW:    A toothache is pain that is caused by irritation of the nerves in the center of your tooth. The irritation may be caused by several problems, such as a cavity, an infection, a cracked tooth, or gum disease. Tooth Anatomy         DISCHARGE INSTRUCTIONS:    Return to the emergency department if:     You have trouble breathing or swallowing.       You have swelling in your face or neck.     Contact your dentist if:     You have a fever and chills.       You have trouble opening or closing your mouth.       You have swelling around your tooth.       You have questions or concerns about your condition or care.    Medicines: You may need any of the following:     NSAIDs, such as ibuprofen, help decrease swelling, pain, and fever. This medicine is available with or without a doctor's order. NSAIDs can cause stomach bleeding or kidney problems in certain people. If you take blood thinner medicine, always ask if NSAIDs are safe for you. Always read the medicine label and follow directions. Do not give these medicines to children under 6 months of age without direction from your child's healthcare provider.      Acetaminophen decreases pain and fever. It is available without a doctor's order. Ask how much to take and how often to take it. Follow directions. Acetaminophen can cause liver damage if not taken correctly.      Prescription pain medicine may be given. Ask your healthcare provider how to take this medicine safely. Some prescription pain medicines contain acetaminophen. Do not take other medicines that contain acetaminophen without talking to your healthcare provider. Too much acetaminophen may cause liver damage. Prescription pain medicine may cause constipation. Ask your healthcare provider how to prevent or treat constipation.       Antibiotics help treat or prevent a bacterial infection.       Take your medicine as directed. Contact your healthcare provider if you think your medicine is not helping or if you have side effects. Tell him of her if you are allergic to any medicine. Keep a list of the medicines, vitamins, and herbs you take. Include the amounts, and when and why you take them. Bring the list or the pill bottles to follow-up visits. Carry your medicine list with you in case of an emergency.    Self-care:     Rinse your mouth with warm salt water 4 times a day or as directed.       Eat soft foods to help relieve pain caused by chewing.       Apply ice on your jaw or cheek for 15 to 20 minutes every hour or as directed. Use an ice pack, or put crushed ice in a plastic bag. Cover it with a towel before you apply it. Ice helps prevent tissue damage and decreases swelling and pain.    Help prevent a toothache:     Brush your teeth at least 2 times a day.      Use dental floss to clean between your teeth at least 1 time a day.      See your dentist regularly every 6 months for dental cleanings and oral exams.    Follow up with your dentist as directed: You may be referred to a dental surgeon. Write down your questions so you remember to ask them during your visits

## 2023-02-22 NOTE — ED PROVIDER NOTE - OBJECTIVE STATEMENT
47-year-old female with past medical history of diabetes complaining of left lower jaw pain since yesterday.  Patient had her left lower tooth extracted 5 days ago and has been taking clindamycin.  Patient states she was feeling okay until yesterday when she started to get some sharp pain in the lower jaw into her ear and with swallowing.  Denies fever, chills, swelling, inability to swallow.  Patient has not taken anything for pain.

## 2023-02-22 NOTE — ED PROVIDER NOTE - PATIENT PORTAL LINK FT
You can access the FollowMyHealth Patient Portal offered by [facility] by registering at the following website: [email or web address]. By joining [employer]’s FollowMyHealth portal, you will also be able to view your health information using other applications (apps) compatible with our system.

## 2023-02-22 NOTE — ED PROVIDER NOTE - PHYSICAL EXAMINATION
CONSTITUTIONAL: Well-appearing;  in no apparent distress.   HEAD: Normocephalic; atraumatic.   EYES: PERRL; EOM intact; conjunctiva and sclera clear  ENT: L lower molar- tooth extracted, gums healing, no open hole, no drainage, no gum swelling. +tenderness around gums. Pharynx clear.

## 2023-02-22 NOTE — ED ADULT NURSE NOTE - CAS DISCH CONDITION
Stable Otc Regimen: Glytone- KP kit Detail Level: Zone Discontinue Regimen: Current products used for KP

## 2023-02-22 NOTE — ED PROVIDER NOTE - CLINICAL SUMMARY MEDICAL DECISION MAKING FREE TEXT BOX
47-year-old female with past medical history of diabetes complaining of left lower jaw pain since yesterday.  Patient had her left lower tooth extracted 5 days ago and has been taking clindamycin.  Patient states she was feeling okay until yesterday when she started to get some sharp pain in the lower jaw into her ear and with swallowing.  Denies fever, chills, swelling, inability to swallow. VSS. L lower molar- tooth extracted, gums healing, no open hole, no drainage, no gum swelling. +tenderness around gums. Pharynx clear. No obvious infection. Pt intrusted to continue abx, take ibuprofen for pain and call her oral surgeon tomorrow. Return precautions discussed

## 2023-02-22 NOTE — ED ADULT TRIAGE NOTE - CHIEF COMPLAINT QUOTE
Pt states "I had a tooth extraction on Friday. Today, I have left sided facial pain and I'm concerned because I have diabetes and I want to make sure I don't have an infection."

## 2023-02-25 DIAGNOSIS — K08.89 OTHER SPECIFIED DISORDERS OF TEETH AND SUPPORTING STRUCTURES: ICD-10-CM

## 2023-02-25 DIAGNOSIS — R68.84 JAW PAIN: ICD-10-CM

## 2023-02-25 DIAGNOSIS — Z79.84 LONG TERM (CURRENT) USE OF ORAL HYPOGLYCEMIC DRUGS: ICD-10-CM

## 2023-02-25 DIAGNOSIS — E11.9 TYPE 2 DIABETES MELLITUS WITHOUT COMPLICATIONS: ICD-10-CM

## 2023-02-25 DIAGNOSIS — Z91.048 OTHER NONMEDICINAL SUBSTANCE ALLERGY STATUS: ICD-10-CM

## 2023-02-25 DIAGNOSIS — Z88.0 ALLERGY STATUS TO PENICILLIN: ICD-10-CM

## 2023-02-28 NOTE — ED ADULT NURSE NOTE - CHIEF COMPLAINT QUOTE
REVIEW OF SYSTEMS    Constitutional: []Fever []Sweat []Chills [] Recent Injury [x] Denies all unless marked  HEENT:[x]Headache  [] Head Injury/Hearing Loss  [] Sore Throat  [] Ear Ache/Dizziness  [x] Denies all unless marked  Spine:  [] Neck pain  [] Back pain  [] Sciaticia  [x] Denies all unless marked  Cardiovascular:[]Heart Disease [x]Chest Pain [] Palpitations  [x] Denies all unless marked  Pulmonary: []Shortness of Breath []Cough   [x] Denies all unless marke  Gastrointestinal: []Nausea  []Vomiting  []Abdominal Pain  []Constipation  []Diarrhea  []Dark Bloody Stools  [x] Denies all unless marked  Psychiatric/Behavioral:[] Depression [x] Anxiety [x] Denies all unless marked  Genitourinary:   [] Frequency  [] Urgency  [] Incontinence [] Pain with Urination  [x] Denies all unless marked  Extremities: []Pain  []Swelling  [x] Denies all unless marked  Musculoskeletal: [x] Muscle Pain  [x] Joint Pain  [] Arthritis [] Muscle Cramps [] Muscle Twitches  [x] Denies all unless marked  Sleep: [x] Insomnia [] Snoring [] Restless Legs [] Sleep Apnea  [] Daytime Sleepiness  [x] Denies all unless marked  Skin:[] Rash [] Skin Discoloration [x] Denies all unless marked   Neurological: []Visual Disturbance/Memory Loss [x] Loss of Balance [] Slurred Speech/Weakness [] Seizures  [x] Vertigo/Dizziness [x] Denies all unless marked "I'm having diarrhea and urinating at the same time. I have spasms."

## 2023-03-14 NOTE — ED ADULT NURSE NOTE - TEMPLATE
89 year old woman with acute on chronic heart failure with preserved ejection fraction and paroxysmal atrial fibrillation.    To continue current furosemide dose  Add spironolactone 12.5 mg daily  check pro-BNP prior to discharge  continue dabigatran for stroke prevention    The plan of care was discussed with the family at bedside. Abdominal Pain, N/V/D

## 2023-04-27 NOTE — ED PROVIDER NOTE - CHIEF COMPLAINT
The patient is a 44y Female complaining of 04-27    125<L>  |  96  |  10  ----------------------------<  132<H>  3.4<L>   |  20<L>  |  0.52    Ca    8.3<L>      27 Apr 2023 05:30  Phos  3.1     04-27  Mg     1.9     04-27    TPro  5.6<L>  /  Alb  2.3<L>  /  TBili  14.3<H>  /  DBili  x   /  AST  140<H>  /  ALT  98<H>  /  AlkPhos  625<H>  04-27

## 2023-05-21 ENCOUNTER — EMERGENCY (EMERGENCY)
Facility: HOSPITAL | Age: 48
LOS: 1 days | Discharge: HOME | End: 2023-05-21
Admitting: EMERGENCY MEDICINE
Payer: MEDICAID

## 2023-05-21 VITALS
TEMPERATURE: 98 F | WEIGHT: 240.08 LBS | RESPIRATION RATE: 18 BRPM | SYSTOLIC BLOOD PRESSURE: 153 MMHG | DIASTOLIC BLOOD PRESSURE: 100 MMHG | OXYGEN SATURATION: 98 % | HEIGHT: 65 IN | HEART RATE: 85 BPM

## 2023-05-21 DIAGNOSIS — Z90.49 ACQUIRED ABSENCE OF OTHER SPECIFIED PARTS OF DIGESTIVE TRACT: Chronic | ICD-10-CM

## 2023-05-21 PROCEDURE — 99283 EMERGENCY DEPT VISIT LOW MDM: CPT

## 2023-05-21 PROCEDURE — 99282 EMERGENCY DEPT VISIT SF MDM: CPT

## 2023-05-21 NOTE — ED ADULT NURSE NOTE - OBJECTIVE STATEMENT
48 y.o. Female c/o x2 right wrist and right fourth and fifth fingers numbness/tingling since yesterday morning. Wrist numbness & tingling has since improved since yesterday and fourth finger sensation improving but no improvement to fifth finger. BEFAST WNL. A/Ox4, NAD. PMHx DM and neuropathy.

## 2023-05-21 NOTE — ED PROVIDER NOTE - CARE PROVIDER_API CALL
JUAN SPARKS  Orthopaedic Surgery  [address] [address]  Phone: [phone]  Fax: [phone]  Follow Up Time:     Nitin Moreno)  Plastic Surgery  [address], [address]  [place] [address]  Phone: [phone]  Fax: [phone]  Follow Up Time:

## 2023-05-21 NOTE — ED PROVIDER NOTE - CLINICAL SUMMARY MEDICAL DECISION MAKING FREE TEXT BOX
pt c/o tingling to r ring and pinky fingers, had some hand tightness yest after "slept on it", the cramp has resolved but tingling to fingers persists, no U/M/R nerve deficits, neg tinnel's doubt carpal tunnel, neurovascular intact w/o any deficits, stable for dc, to rest and ice, prn ibuprofen, suspect paresthesias vs neuropathy vs radiculopathy vs nerve palsy, to f/u w/hand md or pmd, no emergent indication for any imaging or intervention as this time, pt understands and agrees w/plan, strict return precautions given

## 2023-05-21 NOTE — ED ADULT TRIAGE NOTE - CHIEF COMPLAINT QUOTE
Tingling to right fourth and fifth finger x 2 days. Reports intermittent tingling to left hand. Hx of diabetes and neuropathy. BEFAST-, AAOX4, NAD.

## 2023-05-21 NOTE — ED PROVIDER NOTE - PATIENT PORTAL LINK FT
You can access the FollowMyHealth Patient Portal offered by [facility] by registering at the following website: [email or web address]. By joining [employer]’s FollowMyHealth portal, you will also be able to view your health information using other applications (apps) compatible with our system.

## 2023-05-21 NOTE — ED PROVIDER NOTE - MUSCULOSKELETAL, MLM
Spine appears normal, range of motion is not limited, no muscle or joint tenderness; R UE: no swelling, no discolorations, no bony tend over any joints, radial pulse 2+, FROM of shoulder, elbow and wrist joints, no tend over metacarpals, + light touch, cap refill < 2sec, no U/M/R nerve deficits, muscle strength 5/5, good resistance, neg tinnel's sign

## 2023-05-21 NOTE — ED ADULT NURSE NOTE - NSFALLUNIVINTERV_ED_ALL_ED
Bed/Stretcher in lowest position, wheels locked, appropriate side rails in place/Call bell, personal items and telephone in reach/Instruct patient to call for assistance before getting out of bed/chair/stretcher/Non-slip footwear applied when patient is off stretcher/[place] to call system/Physically safe environment - no spills, clutter or unnecessary equipment/Purposeful proactive rounding/Room/bathroom lighting operational, light cord in reach

## 2023-05-21 NOTE — ED PROVIDER NOTE - NSFOLLOWUPINSTRUCTIONS_ED_ALL_ED_FT
rest, avoid sleeping on right side, ice, take ibuprofen if needed, follow up with hand surgeon or pmd
I will SWITCH the dose or number of times a day I take the medications listed below when I get home from the hospital:  None

## 2023-05-21 NOTE — ED PROVIDER NOTE - OBJECTIVE STATEMENT
The pt is a 47 y/o F, who presents to ED c/o tingling to R fingers after sleeping on it - states that hand felt "tight" yest, that resolved but now tingling to 4th and 5th fingers. R hand dominant. Denies trauma, fall, decreased ROM, swelling or discolorations.

## 2023-05-22 DIAGNOSIS — M79.641 PAIN IN RIGHT HAND: ICD-10-CM

## 2023-05-22 DIAGNOSIS — K21.9 GASTRO-ESOPHAGEAL REFLUX DISEASE WITHOUT ESOPHAGITIS: ICD-10-CM

## 2023-05-22 DIAGNOSIS — Z88.0 ALLERGY STATUS TO PENICILLIN: ICD-10-CM

## 2023-05-22 DIAGNOSIS — Z91.048 OTHER NONMEDICINAL SUBSTANCE ALLERGY STATUS: ICD-10-CM

## 2023-05-22 DIAGNOSIS — E11.9 TYPE 2 DIABETES MELLITUS WITHOUT COMPLICATIONS: ICD-10-CM

## 2023-05-22 DIAGNOSIS — Z79.84 LONG TERM (CURRENT) USE OF ORAL HYPOGLYCEMIC DRUGS: ICD-10-CM

## 2023-05-22 DIAGNOSIS — R20.2 PARESTHESIA OF SKIN: ICD-10-CM

## 2023-05-22 DIAGNOSIS — Z86.19 PERSONAL HISTORY OF OTHER INFECTIOUS AND PARASITIC DISEASES: ICD-10-CM

## 2023-05-22 DIAGNOSIS — G43.909 MIGRAINE, UNSPECIFIED, NOT INTRACTABLE, WITHOUT STATUS MIGRAINOSUS: ICD-10-CM

## 2023-05-22 DIAGNOSIS — Z90.49 ACQUIRED ABSENCE OF OTHER SPECIFIED PARTS OF DIGESTIVE TRACT: ICD-10-CM

## 2023-05-22 DIAGNOSIS — E78.5 HYPERLIPIDEMIA, UNSPECIFIED: ICD-10-CM

## 2023-05-24 NOTE — ED PROVIDER NOTE - CPE EDP SKIN NORM
normal... Advancement Flap (Double) Text: The defect edges were debeveled with a #15 scalpel blade.  Given the location of the defect and the proximity to free margins a double advancement flap was deemed most appropriate.  Using a sterile surgical marker, the appropriate advancement flaps were drawn incorporating the defect and placing the expected incisions within the relaxed skin tension lines where possible.    The area thus outlined was incised deep to adipose tissue with a #15 scalpel blade.  The skin margins were undermined to an appropriate distance in all directions utilizing iris scissors.

## 2023-07-27 NOTE — ED ADULT NURSE NOTE - ATTEMPT TO OOB
33y Female with history of PUD on omeprazole, Nexium presenting with black stools x 1 week. Patient also reports generalized fatigue. Denies fevers, chills, headache, chest pain, palpitations, shortness of breath, cough, nausea, vomiting, diarrhea, hematuria, dysuria, focal neurologic symptoms.
no

## 2023-07-27 NOTE — ED PROVIDER NOTE - NO PERTINENT FAMILY HISTORY IN FIRST DEGREE RELATIVES OF:
Anesthesia Post-op Note    Patient: Louise Celaya  Procedure(s) Performed: EGD (ESOPHAGOGASTRODUODENOSCOPY)ULTRASOUND, ENDOSCOPIC, WITH FINE NEEDLE ASPIRATION  Anesthesia type: MAC    Vitals Value Taken Time   Temp 36 07/27/23 0944   Pulse 76 07/27/23 0941   Resp 27 07/27/23 0941   SpO2 100 % 07/27/23 0941   /60 07/27/23 0941         Patient Location: Phase II  Post-op Vital Signs:stable  Level of Consciousness: participates in exam, awake and alert  Respiratory Status: spontaneous ventilation and unassisted  Cardiovascular blood pressure returned to baseline  Hydration: euvolemic  Pain Management: well controlled and adequately managed  Handoff: Handoff to receiving clinician was performed and questions were answered  Vomiting: none  Nausea: None  Airway Patency:fixed obstruction  Post-op Assessment: awake, alert, appropriately conversant, or baseline, no complications, patient tolerated procedure well, no evidence of recall, regional anesthetic in place - able to participate, dentition within defined limits, moving all extremities and No Corneal Abrasion      There were no known notable events for this encounter.  
n/a

## 2023-10-28 NOTE — ED ADULT TRIAGE NOTE - WEIGHT IN KG
111.1
MEDICATIONS  (STANDING):  carbidopa 36.25 mG/levodopa 145 mG ER 3 Capsule(s) Oral <User Schedule>  cephalexin 500 milliGRAM(s) Oral every 12 hours  erythromycin   Ointment 1 Application(s) Both EYES four times a day  escitalopram 20 milliGRAM(s) Oral daily  famotidine    Tablet 20 milliGRAM(s) Oral every 12 hours  gabapentin 100 milliGRAM(s) Oral daily  gabapentin 200 milliGRAM(s) Oral at bedtime  lidocaine   4% Patch 2 Patch Transdermal daily  polyethylene glycol 3350 17 Gram(s) Oral two times a day  rotigotine patch 3 mG/24 Hr(s) 1 Patch Transdermal every 24 hours  senna 2 Tablet(s) Oral at bedtime    MEDICATIONS  (PRN):  acetaminophen     Tablet .. 650 milliGRAM(s) Oral every 6 hours PRN Mild Pain (1 - 3)  bisacodyl 5 milliGRAM(s) Oral daily PRN Constipation  clonazePAM Oral Disintegrating Tablet 0.5 milliGRAM(s) Oral two times a day PRN Anxiety

## 2023-11-14 ENCOUNTER — EMERGENCY (EMERGENCY)
Facility: HOSPITAL | Age: 48
LOS: 1 days | Discharge: HOME | End: 2023-11-14
Attending: STUDENT IN AN ORGANIZED HEALTH CARE EDUCATION/TRAINING PROGRAM | Admitting: STUDENT IN AN ORGANIZED HEALTH CARE EDUCATION/TRAINING PROGRAM
Payer: MEDICAID

## 2023-11-14 VITALS
RESPIRATION RATE: 20 BRPM | HEART RATE: 80 BPM | DIASTOLIC BLOOD PRESSURE: 84 MMHG | OXYGEN SATURATION: 96 % | SYSTOLIC BLOOD PRESSURE: 134 MMHG | TEMPERATURE: 98 F

## 2023-11-14 VITALS
HEIGHT: 65 IN | OXYGEN SATURATION: 96 % | TEMPERATURE: 98 F | WEIGHT: 255.07 LBS | SYSTOLIC BLOOD PRESSURE: 136 MMHG | HEART RATE: 81 BPM | RESPIRATION RATE: 18 BRPM | DIASTOLIC BLOOD PRESSURE: 88 MMHG

## 2023-11-14 DIAGNOSIS — Z90.49 ACQUIRED ABSENCE OF OTHER SPECIFIED PARTS OF DIGESTIVE TRACT: Chronic | ICD-10-CM

## 2023-11-14 LAB
ANION GAP SERPL CALC-SCNC: 9 MMOL/L — SIGNIFICANT CHANGE UP (ref 5–17)
ANION GAP SERPL CALC-SCNC: 9 MMOL/L — SIGNIFICANT CHANGE UP (ref 5–17)
B-OH-BUTYR SERPL-SCNC: 0.1 MMOL/L — SIGNIFICANT CHANGE UP
B-OH-BUTYR SERPL-SCNC: 0.1 MMOL/L — SIGNIFICANT CHANGE UP
BASOPHILS # BLD AUTO: 0.01 K/UL — SIGNIFICANT CHANGE UP (ref 0–0.2)
BASOPHILS # BLD AUTO: 0.01 K/UL — SIGNIFICANT CHANGE UP (ref 0–0.2)
BASOPHILS NFR BLD AUTO: 0.2 % — SIGNIFICANT CHANGE UP (ref 0–2)
BASOPHILS NFR BLD AUTO: 0.2 % — SIGNIFICANT CHANGE UP (ref 0–2)
BUN SERPL-MCNC: 8 MG/DL — SIGNIFICANT CHANGE UP (ref 7–23)
BUN SERPL-MCNC: 8 MG/DL — SIGNIFICANT CHANGE UP (ref 7–23)
CALCIUM SERPL-MCNC: 9 MG/DL — SIGNIFICANT CHANGE UP (ref 8.4–10.5)
CALCIUM SERPL-MCNC: 9 MG/DL — SIGNIFICANT CHANGE UP (ref 8.4–10.5)
CHLORIDE SERPL-SCNC: 100 MMOL/L — SIGNIFICANT CHANGE UP (ref 96–108)
CHLORIDE SERPL-SCNC: 100 MMOL/L — SIGNIFICANT CHANGE UP (ref 96–108)
CO2 SERPL-SCNC: 27 MMOL/L — SIGNIFICANT CHANGE UP (ref 22–31)
CO2 SERPL-SCNC: 27 MMOL/L — SIGNIFICANT CHANGE UP (ref 22–31)
CREAT SERPL-MCNC: 0.54 MG/DL — SIGNIFICANT CHANGE UP (ref 0.5–1.3)
CREAT SERPL-MCNC: 0.54 MG/DL — SIGNIFICANT CHANGE UP (ref 0.5–1.3)
EGFR: 114 ML/MIN/1.73M2 — SIGNIFICANT CHANGE UP
EGFR: 114 ML/MIN/1.73M2 — SIGNIFICANT CHANGE UP
EOSINOPHIL # BLD AUTO: 0.11 K/UL — SIGNIFICANT CHANGE UP (ref 0–0.5)
EOSINOPHIL # BLD AUTO: 0.11 K/UL — SIGNIFICANT CHANGE UP (ref 0–0.5)
EOSINOPHIL NFR BLD AUTO: 2 % — SIGNIFICANT CHANGE UP (ref 0–6)
EOSINOPHIL NFR BLD AUTO: 2 % — SIGNIFICANT CHANGE UP (ref 0–6)
GLUCOSE SERPL-MCNC: 262 MG/DL — HIGH (ref 70–99)
GLUCOSE SERPL-MCNC: 262 MG/DL — HIGH (ref 70–99)
HCT VFR BLD CALC: 37.5 % — SIGNIFICANT CHANGE UP (ref 34.5–45)
HCT VFR BLD CALC: 37.5 % — SIGNIFICANT CHANGE UP (ref 34.5–45)
HGB BLD-MCNC: 12.4 G/DL — SIGNIFICANT CHANGE UP (ref 11.5–15.5)
HGB BLD-MCNC: 12.4 G/DL — SIGNIFICANT CHANGE UP (ref 11.5–15.5)
IMM GRANULOCYTES NFR BLD AUTO: 0.2 % — SIGNIFICANT CHANGE UP (ref 0–0.9)
IMM GRANULOCYTES NFR BLD AUTO: 0.2 % — SIGNIFICANT CHANGE UP (ref 0–0.9)
LYMPHOCYTES # BLD AUTO: 2.5 K/UL — SIGNIFICANT CHANGE UP (ref 1–3.3)
LYMPHOCYTES # BLD AUTO: 2.5 K/UL — SIGNIFICANT CHANGE UP (ref 1–3.3)
LYMPHOCYTES # BLD AUTO: 45.7 % — HIGH (ref 13–44)
LYMPHOCYTES # BLD AUTO: 45.7 % — HIGH (ref 13–44)
MCHC RBC-ENTMCNC: 28.6 PG — SIGNIFICANT CHANGE UP (ref 27–34)
MCHC RBC-ENTMCNC: 28.6 PG — SIGNIFICANT CHANGE UP (ref 27–34)
MCHC RBC-ENTMCNC: 33.1 GM/DL — SIGNIFICANT CHANGE UP (ref 32–36)
MCHC RBC-ENTMCNC: 33.1 GM/DL — SIGNIFICANT CHANGE UP (ref 32–36)
MCV RBC AUTO: 86.6 FL — SIGNIFICANT CHANGE UP (ref 80–100)
MCV RBC AUTO: 86.6 FL — SIGNIFICANT CHANGE UP (ref 80–100)
MONOCYTES # BLD AUTO: 0.47 K/UL — SIGNIFICANT CHANGE UP (ref 0–0.9)
MONOCYTES # BLD AUTO: 0.47 K/UL — SIGNIFICANT CHANGE UP (ref 0–0.9)
MONOCYTES NFR BLD AUTO: 8.6 % — SIGNIFICANT CHANGE UP (ref 2–14)
MONOCYTES NFR BLD AUTO: 8.6 % — SIGNIFICANT CHANGE UP (ref 2–14)
NEUTROPHILS # BLD AUTO: 2.37 K/UL — SIGNIFICANT CHANGE UP (ref 1.8–7.4)
NEUTROPHILS # BLD AUTO: 2.37 K/UL — SIGNIFICANT CHANGE UP (ref 1.8–7.4)
NEUTROPHILS NFR BLD AUTO: 43.3 % — SIGNIFICANT CHANGE UP (ref 43–77)
NEUTROPHILS NFR BLD AUTO: 43.3 % — SIGNIFICANT CHANGE UP (ref 43–77)
NRBC # BLD: 0 /100 WBCS — SIGNIFICANT CHANGE UP (ref 0–0)
NRBC # BLD: 0 /100 WBCS — SIGNIFICANT CHANGE UP (ref 0–0)
PLATELET # BLD AUTO: 241 K/UL — SIGNIFICANT CHANGE UP (ref 150–400)
PLATELET # BLD AUTO: 241 K/UL — SIGNIFICANT CHANGE UP (ref 150–400)
POTASSIUM SERPL-MCNC: 4.5 MMOL/L — SIGNIFICANT CHANGE UP (ref 3.5–5.3)
POTASSIUM SERPL-MCNC: 4.5 MMOL/L — SIGNIFICANT CHANGE UP (ref 3.5–5.3)
POTASSIUM SERPL-SCNC: 4.5 MMOL/L — SIGNIFICANT CHANGE UP (ref 3.5–5.3)
POTASSIUM SERPL-SCNC: 4.5 MMOL/L — SIGNIFICANT CHANGE UP (ref 3.5–5.3)
RBC # BLD: 4.33 M/UL — SIGNIFICANT CHANGE UP (ref 3.8–5.2)
RBC # BLD: 4.33 M/UL — SIGNIFICANT CHANGE UP (ref 3.8–5.2)
RBC # FLD: 12.6 % — SIGNIFICANT CHANGE UP (ref 10.3–14.5)
RBC # FLD: 12.6 % — SIGNIFICANT CHANGE UP (ref 10.3–14.5)
SODIUM SERPL-SCNC: 136 MMOL/L — SIGNIFICANT CHANGE UP (ref 135–145)
SODIUM SERPL-SCNC: 136 MMOL/L — SIGNIFICANT CHANGE UP (ref 135–145)
TROPONIN T, HIGH SENSITIVITY RESULT: 8 NG/L — SIGNIFICANT CHANGE UP (ref 0–51)
TROPONIN T, HIGH SENSITIVITY RESULT: 8 NG/L — SIGNIFICANT CHANGE UP (ref 0–51)
TROPONIN T, HIGH SENSITIVITY RESULT: 9 NG/L — SIGNIFICANT CHANGE UP (ref 0–51)
TROPONIN T, HIGH SENSITIVITY RESULT: 9 NG/L — SIGNIFICANT CHANGE UP (ref 0–51)
WBC # BLD: 5.47 K/UL — SIGNIFICANT CHANGE UP (ref 3.8–10.5)
WBC # BLD: 5.47 K/UL — SIGNIFICANT CHANGE UP (ref 3.8–10.5)
WBC # FLD AUTO: 5.47 K/UL — SIGNIFICANT CHANGE UP (ref 3.8–10.5)
WBC # FLD AUTO: 5.47 K/UL — SIGNIFICANT CHANGE UP (ref 3.8–10.5)

## 2023-11-14 PROCEDURE — 36415 COLL VENOUS BLD VENIPUNCTURE: CPT

## 2023-11-14 PROCEDURE — 80048 BASIC METABOLIC PNL TOTAL CA: CPT

## 2023-11-14 PROCEDURE — 71045 X-RAY EXAM CHEST 1 VIEW: CPT

## 2023-11-14 PROCEDURE — 99283 EMERGENCY DEPT VISIT LOW MDM: CPT | Mod: 25

## 2023-11-14 PROCEDURE — 82962 GLUCOSE BLOOD TEST: CPT

## 2023-11-14 PROCEDURE — 82010 KETONE BODYS QUAN: CPT

## 2023-11-14 PROCEDURE — 99285 EMERGENCY DEPT VISIT HI MDM: CPT

## 2023-11-14 PROCEDURE — 71045 X-RAY EXAM CHEST 1 VIEW: CPT | Mod: 26

## 2023-11-14 PROCEDURE — 85025 COMPLETE CBC W/AUTO DIFF WBC: CPT

## 2023-11-14 PROCEDURE — 84484 ASSAY OF TROPONIN QUANT: CPT

## 2023-11-14 RX ORDER — DULAGLUTIDE 4.5 MG/.5ML
0.75 INJECTION, SOLUTION SUBCUTANEOUS
Qty: 4 | Refills: 0
Start: 2023-11-14 | End: 2023-12-13

## 2023-11-14 RX ORDER — SODIUM CHLORIDE 9 MG/ML
1000 INJECTION INTRAMUSCULAR; INTRAVENOUS; SUBCUTANEOUS ONCE
Refills: 0 | Status: COMPLETED | OUTPATIENT
Start: 2023-11-14 | End: 2023-11-14

## 2023-11-14 RX ADMIN — SODIUM CHLORIDE 1000 MILLILITER(S): 9 INJECTION INTRAMUSCULAR; INTRAVENOUS; SUBCUTANEOUS at 15:52

## 2023-11-14 NOTE — ED PROVIDER NOTE - NSFOLLOWUPINSTRUCTIONS_ED_ALL_ED_FT
You were seen in the Emergency Department for: left arm pain    You were prescribed to the pharmacy:  Please follow the instructions on the container/label and ask your pharmacist for any questions/concerns.    For pain, you may take Tylenol (acetaminophen) 975 mg every 6 hours.    Please follow up with your primary physician. If you do not have a primary physician or specialist of your needs, please call [phone] to find one convenient for you. At this number you will be able to locate a provider who accepts your insurance, as well as locate the right specialist for your needs.    You should return to the Emergency Department if you feel any new/worsening/persistent symptoms including but not limited to: chest pain, difficulty breathing, loss of consciousness, bleeding, uncontrolled pain, numbness/weakness of a body part You were seen in the Emergency Department for: left arm pain    You were prescribed to the pharmacy: Trulicity  Please follow the instructions on the container/label and ask your pharmacist for any questions/concerns.    For pain, you may take Tylenol (acetaminophen) 975 mg every 6 hours.    Please follow up with your primary physician. If you do not have a primary physician or specialist of your needs, please call [phone] to find one convenient for you. At this number you will be able to locate a provider who accepts your insurance, as well as locate the right specialist for your needs.    You should return to the Emergency Department if you feel any new/worsening/persistent symptoms including but not limited to: chest pain, difficulty breathing, loss of consciousness, bleeding, uncontrolled pain, numbness/weakness of a body part

## 2023-11-14 NOTE — ED ADULT NURSE NOTE - NSFALLUNIVINTERV_ED_ALL_ED
Bed/Stretcher in lowest position, wheels locked, appropriate side rails in place/Call bell, personal items and telephone in reach/Instruct patient to call for assistance before getting out of bed/chair/stretcher/Non-slip footwear applied when patient is off stretcher/[place] to call system/Physically safe environment - no spills, clutter or unnecessary equipment/Purposeful proactive rounding/Room/bathroom lighting operational, light cord in reach

## 2023-11-14 NOTE — ED PROVIDER NOTE - ST/T WAVE
isolated 1mm V2 elevation not meeting STEMI criteria, similar morphology in previous EKGs including TWI/STD isolated in lead III

## 2023-11-14 NOTE — ED PROVIDER NOTE - PATIENT PORTAL LINK FT
You can access the FollowMyHealth Patient Portal offered by [facility] by registering at the following website: [email or web address]. By joining [employer]’s FollowMyHealth portal, you will also be able to view your health information using other applications (apps) compatible with our system.

## 2023-11-14 NOTE — ED PROVIDER NOTE - PHYSICAL EXAMINATION
Gen - NAD; well-appearing; A+Ox3   HEENT - NCAT, EOMI  Neck - supple  Resp - CTAB, no increased WOB  CV -  RRR, no m/r/g  Abd - soft, NT, ND; no guarding or rebound  Back - no midline, paraspinous, or CVA tenderness  MSK - FROM of b/l UE and LE, no gross deformities  Extrem - no LE edema/erythema/tenderness; 2+ radial pulse equal b/l  Neuro - CN2-12 grossly intact, full motor strength and sensation to LT throughout  Skin - warm, well perfused

## 2023-11-14 NOTE — ED ADULT NURSE NOTE - OBJECTIVE STATEMENT
Pt arrived to the ER c/o palpitations with associated  left sided arm pain. Pt also states elevated sugar due to not taking DM medication. Pt at this moment refuses no other medical complaints. Pt is noted to be aox3, able to maintain airway, having nonlabored breathing, no retractions noted, non diaphoretic and able to talk in clear full sentences. IV placed and labs were sent out.

## 2023-11-14 NOTE — ED PROVIDER NOTE - PROGRESS NOTE DETAILS
Cole Freeman MD: Patient reassessed, feels well. Will DC with pmd f/u. Requests Trulicity refill [facility]. MARY.

## 2023-11-14 NOTE — ED ADULT NURSE NOTE - CHIEF COMPLAINT QUOTE
Pt presents to ED here for palpitations and L sided arm pain. Pt denies headaches, dizziness, blurred vison. Pt has hx of DM. Pt A&Ox4, conversive in full sentences and ambulates. Pt reports she took ASA at 1200pm. EKG in prog. Denies cardiac hx. .

## 2023-11-14 NOTE — ED ADULT TRIAGE NOTE - CHIEF COMPLAINT QUOTE
Pt presents to ED here for L sided arm pain started at 1200pm. Pt denies headaches, dizziness, blurred vison. Pt ahs hx of DM. Pt A&Ox4, conversive in full sentences and ambulates. Pt has hx of DM. Pt presents to ED here for L sided arm pain started at 1200pm. Pt denies headaches, dizziness, blurred vison. Pt has hx of DM. Pt A&Ox4, conversive in full sentences and ambulates. Pt has hx of DM. Pt reports she took ASA at 1200pm. Pt also reports heart palpitations. Pt presents to ED here for palpitations and L sided arm pain started at 1200pm. Pt denies headaches, dizziness, blurred vison. Pt has hx of DM. Pt A&Ox4, conversive in full sentences and ambulates. Pt has hx of DM. Pt reports she took ASA at 1200pm. Pt also reports heart palpitations. EKG in prog. Denies cardiac hx. Pt presents to ED here for palpitations and L sided arm pain started at 1200pm. Pt denies headaches, dizziness, blurred vison. Pt has hx of DM. Pt A&Ox4, conversive in full sentences and ambulates. Pt has hx of DM. Pt reports she took ASA at 1200pm. Pt also reports heart palpitations. EKG in prog. Denies cardiac hx. . Pt presents to ED here for palpitations and L sided arm pain. Pt denies headaches, dizziness, blurred vison. Pt has hx of DM. Pt A&Ox4, conversive in full sentences and ambulates. Pt reports she took ASA at 1200pm. EKG in prog. Denies cardiac hx. .

## 2023-11-14 NOTE — ED PROVIDER NOTE - OBJECTIVE STATEMENT
48 year old female with history of DM presenting with palpitations and LUE pain. 48 year old female with history of DM presenting with palpitations and LUE pain. States she woke up this AM with pain to her LUE, radiates down from shoulder with some tingling in her L fingers. No chest pain, sob, diaphoresis, dizziness, vision change, numbness, focal weakness. Took aspirin with some improvement. States also she has been unable to fill her trulicity due to backlog at pharmacy. Taking metformin.

## 2023-11-14 NOTE — ED PROVIDER NOTE - CLINICAL SUMMARY MEDICAL DECISION MAKING FREE TEXT BOX
48 year old female with history of DM presenting with palpitations and LUE pain x 1d. 48 year old female with history of DM presenting with palpitations and LUE pain x 1d. Well appearing here, neurologically intact, vitals wnl.  on arrival. No clinical evidence of DKA, low suspicion. EKG nonischemic. Will r/o atypical ACS with troponin level and check for electrolyte derangement. IVF bolus for hyperglycemia. Anticipate dc with pmd f/u.

## 2023-11-16 DIAGNOSIS — T38.3X6A UNDERDOSING OF INSULIN AND ORAL HYPOGLYCEMIC [ANTIDIABETIC] DRUGS, INITIAL ENCOUNTER: ICD-10-CM

## 2023-11-16 DIAGNOSIS — R20.2 PARESTHESIA OF SKIN: ICD-10-CM

## 2023-11-16 DIAGNOSIS — Z79.84 LONG TERM (CURRENT) USE OF ORAL HYPOGLYCEMIC DRUGS: ICD-10-CM

## 2023-11-16 DIAGNOSIS — R00.2 PALPITATIONS: ICD-10-CM

## 2023-11-16 DIAGNOSIS — Z91.138 PATIENT'S UNINTENTIONAL UNDERDOSING OF MEDICATION REGIMEN FOR OTHER REASON: ICD-10-CM

## 2023-11-16 DIAGNOSIS — E11.65 TYPE 2 DIABETES MELLITUS WITH HYPERGLYCEMIA: ICD-10-CM

## 2023-11-16 DIAGNOSIS — M79.602 PAIN IN LEFT ARM: ICD-10-CM

## 2023-11-28 NOTE — ED ADULT NURSE NOTE - ALCOHOL PRE SCREEN (AUDIT - C)
Statement Selected
No. SUKUMAR screening performed.  STOP BANG Legend: 0-2 = LOW Risk; 3-4 = INTERMEDIATE Risk; 5-8 = HIGH Risk

## 2023-12-03 NOTE — ED PROVIDER NOTE - CARE PLAN
Bed/Stretcher in lowest position, wheels locked, appropriate side rails in place/Call bell, personal items and telephone in reach/Instruct patient to call for assistance before getting out of bed/chair/stretcher/Non-slip footwear applied when patient is off stretcher/[place] to call system/Physically safe environment - no spills, clutter or unnecessary equipment/Purposeful proactive rounding/Room/bathroom lighting operational, light cord in reach Bed/Stretcher in lowest position, wheels locked, appropriate side rails in place/Call bell, personal items and telephone in reach/Instruct patient to call for assistance before getting out of bed/chair/stretcher/Non-slip footwear applied when patient is off stretcher/[place] to call system/Physically safe environment - no spills, clutter or unnecessary equipment/Purposeful proactive rounding/Room/bathroom lighting operational, light cord in reach Principal Discharge DX:	Dizziness  Secondary Diagnosis:	Headache

## 2024-01-31 NOTE — ED ADULT NURSE NOTE - NS ED NURSE LEVEL OF CONSCIOUSNESS SPEECH
UC negative, pt sent home on Amoxicillin for Otitis Media, pt to remain on Amoxicillin for Otitis Media  
Speaking Coherently

## 2024-02-21 NOTE — ED PROVIDER NOTE - EKG ADDITIONAL QUESTION - PERFORMED INDEPENDENT VISUALIZATION
Attending Attestation (For Attendings USE Only)... Yes 0 (no pain/absence of nonverbal indicators of pain)

## 2024-03-15 NOTE — ED PROVIDER NOTE - NEUROLOGICAL MOTOR
Annual exam appt for 03/20/2024  Pended annual TBC labs//PSA done on 3/15/24  Please sign if appropriate   normal

## 2024-05-16 ENCOUNTER — EMERGENCY (EMERGENCY)
Facility: HOSPITAL | Age: 49
LOS: 1 days | Discharge: HOME | End: 2024-05-16
Attending: EMERGENCY MEDICINE | Admitting: EMERGENCY MEDICINE
Payer: MEDICAID

## 2024-05-16 VITALS
HEART RATE: 100 BPM | OXYGEN SATURATION: 98 % | SYSTOLIC BLOOD PRESSURE: 152 MMHG | RESPIRATION RATE: 18 BRPM | DIASTOLIC BLOOD PRESSURE: 87 MMHG | WEIGHT: 250 LBS | TEMPERATURE: 99 F

## 2024-05-16 DIAGNOSIS — M54.89 OTHER DORSALGIA: ICD-10-CM

## 2024-05-16 DIAGNOSIS — Z90.49 ACQUIRED ABSENCE OF OTHER SPECIFIED PARTS OF DIGESTIVE TRACT: Chronic | ICD-10-CM

## 2024-05-16 DIAGNOSIS — R05.9 COUGH, UNSPECIFIED: ICD-10-CM

## 2024-05-16 DIAGNOSIS — Z88.0 ALLERGY STATUS TO PENICILLIN: ICD-10-CM

## 2024-05-16 DIAGNOSIS — Z91.09 OTHER ALLERGY STATUS, OTHER THAN TO DRUGS AND BIOLOGICAL SUBSTANCES: ICD-10-CM

## 2024-05-16 DIAGNOSIS — E66.3 OVERWEIGHT: ICD-10-CM

## 2024-05-16 DIAGNOSIS — E11.9 TYPE 2 DIABETES MELLITUS WITHOUT COMPLICATIONS: ICD-10-CM

## 2024-05-16 DIAGNOSIS — U07.1 COVID-19: ICD-10-CM

## 2024-05-16 LAB
FLUAV AG NPH QL: SIGNIFICANT CHANGE UP
FLUBV AG NPH QL: SIGNIFICANT CHANGE UP
RSV RNA NPH QL NAA+NON-PROBE: SIGNIFICANT CHANGE UP
SARS-COV-2 RNA SPEC QL NAA+PROBE: DETECTED

## 2024-05-16 PROCEDURE — 87637 SARSCOV2&INF A&B&RSV AMP PRB: CPT

## 2024-05-16 PROCEDURE — 99283 EMERGENCY DEPT VISIT LOW MDM: CPT | Mod: 25

## 2024-05-16 PROCEDURE — 99284 EMERGENCY DEPT VISIT MOD MDM: CPT

## 2024-05-16 PROCEDURE — 71046 X-RAY EXAM CHEST 2 VIEWS: CPT | Mod: 26

## 2024-05-16 PROCEDURE — 71046 X-RAY EXAM CHEST 2 VIEWS: CPT

## 2024-05-16 RX ORDER — ACETAMINOPHEN 500 MG
650 TABLET ORAL ONCE
Refills: 0 | Status: COMPLETED | OUTPATIENT
Start: 2024-05-16 | End: 2024-05-16

## 2024-05-16 RX ADMIN — Medication 650 MILLIGRAM(S): at 15:46

## 2024-05-16 NOTE — ED PROVIDER NOTE - CLINICAL SUMMARY MEDICAL DECISION MAKING FREE TEXT BOX
49F PMH DM p/w cough since yesterday. Also has b/l chest and b/l upper back pain, only w/ coughing. No other systemic symptoms. States it feels similar to when she was diagnosed with pneumonia 9 years ago.   Triage  self improving, other vitals wnl. Exam as above.   ddx: Likely viral, possible bacterial PNA. Clinically not ACS, PE, tamponade, dissection, PTX, perf, myocarditis, mediastinitis.   CXR, likely outpt f/u.

## 2024-05-16 NOTE — ED PROVIDER NOTE - PATIENT PORTAL LINK FT
You can access the FollowMyHealth Patient Portal offered by [facility] by registering at the following website: [email or web address]. By joining [employer]’s FollowMyHealth portal, you will also be able to view your health information using other applications (apps) compatible with our system.

## 2024-05-16 NOTE — ED PROVIDER NOTE - PATIENT'S PREFERRED PRONOUN
If you are a smoker, it is important for your health to stop smoking. Please be aware that second hand smoke is also harmful.
Her/She

## 2024-05-16 NOTE — ED PROVIDER NOTE - PROGRESS NOTE DETAILS
[place]: CXR wnl. COVID+. Remains well appearing. Discussed importance of outpt follow up and return precautions. Clinically no indication for further emergent ED workup or hospitalization at this time. Stable for dc, outpt f/u.

## 2024-05-16 NOTE — ED PROVIDER NOTE - OBJECTIVE STATEMENT
49F PMH DM p/w cough since yesterday. Also has b/l chest and b/l upper back pain, only w/ coughing. No other systemic symptoms. States it feels similar to when she was diagnosed with pneumonia 9 years ago.   Denies HA, SOB, f/c, rhinorrhea/sore throat, NVD, abd pain, urinary complaints, LE pain/swelling, recent travel, known sick contacts, rashes.

## 2024-07-05 NOTE — ED ADULT NURSE NOTE - NS ED NURSE LEVEL OF CONSCIOUSNESS ORIENTATION
Detail Level: Detailed Oriented - self; Oriented - place; Oriented - time Quality 226: Preventive Care And Screening: Tobacco Use: Screening And Cessation Intervention: Patient screened for tobacco use and is an ex/non-smoker

## 2024-07-16 NOTE — ED PROVIDER NOTE - ENMT, MLM
Refer to the Assessment tab to view/cancel completed assessment.
Airway patent, Nasal mucosa clear. Mouth with normal mucosa.

## 2024-07-29 NOTE — ED ADULT TRIAGE NOTE - HEART RATE (BEATS/MIN)
Presented to ED with bilateral leg swelling x 4 days. Right leg more swollen and red over the last 2 days. Significant pain and having drainage in the right leg as well. NO fevers or chills.   No recent antibiotics outpatient  CT RLE: No fluid collection seen, cellulitic changes leg, No intramuscular collection, no lytic lesion or periosteal reaction  Procal slightly elevated 0.89, now down trending   Wound culture pending  BC 1/2 gram negative rods; Enterobacterales   Follow CBC and fever curve  ID consulted: Start Zosyn renally dose, follow-up blood cultures, anticipate 10 days of therapy. Slow and prolonged recovery expected  Now agreeable to wound care and dressing changes   General surgery consulted- no surgical intervention, no signs of necrosis. Continue wound care    Lab Results   Component Value Date    WBC 6.76 07/29/2024    WBC 8.89 07/28/2024    WBC 10.80 (H) 07/27/2024      97

## 2024-09-24 NOTE — ED ADULT TRIAGE NOTE - PAIN RATING/NUMBER SCALE (0-10): ACTIVITY
Medication Therapy Management (MTM) Encounter    ASSESSMENT:                            Medication Adherence/Access: No issues identified    Weight management:   Continues to find benefit from semaglutide.  He would like to hold the dose at 1mg and will reach out if needing a dose adjustment.     PLAN:                            Continue semaglutide 1mg every 7 days    Follow-up: 4 months    SUBJECTIVE/OBJECTIVE:                          Regino Bailey is a 26 year old male seen for a follow-up visit.       Reason for visit: Wegovy follow-up.    Allergies/ADRs: Reviewed in chart  Past Medical History: Reviewed in chart  Tobacco: He reports that he has never smoked. He has never used smokeless tobacco.  Alcohol: Less than 1 beverages / week  Caffeine-none the last 2 weeks    Medication Adherence/Access: no issues reported    Weight Management   Semaglutide (compounded Wegovy) 1mg every 7 days    Side effects: none    Nutrition/Eating Habits: stopped eating breakfast, notices lower appetite for about 5 days, then appetite starts to increase slightly. Medication is helping control portion sizes of his meals.  He is starting with a new therapist and wants to work on strategies to avoid binge eating.    Exercise/Activity: none currently, planning to restart running. Had been very active in his teens, swimmer.  Medication History:  None    Starting Weight: 230-240 lb, patient reported  Goal weight: ~180 lb  He would like to stop using medication within the next year.       Current weight today: 214 lbs 0 oz Reports home weight 209 lb  Cumulative Weight Loss: -32 lb, -13% from baseline    Wt Readings from Last 4 Encounters:   09/13/24 215 lb (97.5 kg)   12/11/23 246 lb (111.6 kg)   11/14/22 238 lb (108 kg)   10/07/22 235 lb (106.6 kg)     Estimated body mass index is 29.16 kg/m  as calculated from the following:    Height as of 9/13/24: 6' (1.829 m).    Weight as of 9/13/24: 215 lb (97.5 kg).      Today's Vitals: /84    Wt 214 lb (97.1 kg)   BMI 29.02 kg/m    ----------------      I spent 30 minutes with this patient today. All changes were made via collaborative practice agreement with William Ramos NP. A copy of the visit note was provided to the patient's provider(s).    A summary of these recommendations was sent via [employer].    Renu MoncadaD, [facility] CenterCP   Medication Therapy Management Pharmacist   [facility]  [phone]          Medication Therapy Recommendations  No medication therapy recommendations to display    5

## 2024-10-02 NOTE — ED ADULT NURSE NOTE - NSFALLRSKASSESSTYPE_ED_ALL_ED
Detail Level: Detailed Prescription Strength Graduated Compression Stockings Recommendations: The patient was counseled that prescription strength graduated compression stockings should be worn for all waking hours. They will follow up with a venous specialist to monitor graduated compression stocking usage and their symptoms. Initial (On Arrival)

## 2024-10-21 ENCOUNTER — EMERGENCY (EMERGENCY)
Facility: HOSPITAL | Age: 49
LOS: 1 days | Discharge: HOME | End: 2024-10-21
Attending: EMERGENCY MEDICINE | Admitting: EMERGENCY MEDICINE
Payer: MEDICAID

## 2024-10-21 VITALS
HEART RATE: 98 BPM | DIASTOLIC BLOOD PRESSURE: 97 MMHG | SYSTOLIC BLOOD PRESSURE: 150 MMHG | TEMPERATURE: 99 F | RESPIRATION RATE: 19 BRPM | OXYGEN SATURATION: 97 % | WEIGHT: 250 LBS

## 2024-10-21 DIAGNOSIS — Z90.49 ACQUIRED ABSENCE OF OTHER SPECIFIED PARTS OF DIGESTIVE TRACT: Chronic | ICD-10-CM

## 2024-10-21 LAB
FLUAV AG NPH QL: SIGNIFICANT CHANGE UP
FLUBV AG NPH QL: SIGNIFICANT CHANGE UP
RSV RNA NPH QL NAA+NON-PROBE: SIGNIFICANT CHANGE UP
SARS-COV-2 RNA SPEC QL NAA+PROBE: SIGNIFICANT CHANGE UP

## 2024-10-21 PROCEDURE — 87637 SARSCOV2&INF A&B&RSV AMP PRB: CPT

## 2024-10-21 PROCEDURE — 99283 EMERGENCY DEPT VISIT LOW MDM: CPT | Mod: 25

## 2024-10-21 PROCEDURE — 99284 EMERGENCY DEPT VISIT MOD MDM: CPT

## 2024-10-21 PROCEDURE — 71046 X-RAY EXAM CHEST 2 VIEWS: CPT | Mod: 26

## 2024-10-21 PROCEDURE — 71046 X-RAY EXAM CHEST 2 VIEWS: CPT

## 2024-10-21 RX ORDER — AZITHROMYCIN 250 MG/1
1 TABLET, FILM COATED ORAL
Qty: 1 | Refills: 0
Start: 2024-10-21 | End: 2024-10-25

## 2024-10-21 NOTE — ED PROVIDER NOTE - PATIENT PORTAL LINK FT
You can access the FollowMyHealth Patient Portal offered by [facility] by registering at the following website: [email or web address]. By joining [facility]’s FollowMyHealth portal, you will also be able to view your health information using other applications (apps) compatible with our system.

## 2024-10-21 NOTE — ED ADULT TRIAGE NOTE - TEMP AT ED ARRIVAL (C)
[facility] Dermatology Note      Dermatology Problem List:  1. Alopecia areata  - Current treatment: shampoo 0.05% 3-4x weekly  - Prior treatment: derma-smoothe/fs oil 1x weekly, ILK  - [facility]  - ILK injections 7/21/20  2. History of lichen simplex chronicus vs hypertrophic scar - left achilles region  3. History of contact dermatitis, L ring finger: Suspect ACD 2/2 metal  4. Positive Hep C Ab    Encounter Date: Jul 21, 2020    CC:  Chief Complaint   Patient presents with     Hair Loss     Shea is here today for a hair loss follow up- getting worse.          History of Present Illness:  Ms. Shea Siddiqui is a 36 year old female who presents as a follow-up for Alopecia Areata. The patient was last seen in April [facility] when she was continuing her prednisone taper and we had to wait on the JAK inhibitor initiation due to positive Hep C antibody on baseline labs.      Has been getting worse since February march. Has to wear a wig now. Diffuse loss on the scalp. No loss on the eyebrows, lashes or other body hair.     Currently only using the clobetasol shampoo 1-2 times a week. Has done injections in the past and was on oral prednisone.     Just finished the oral prednisone about a weeks ago. Doesn't feel like the prednisone helped, but it has helped in the past. This course was a litle lower dose than previous course. Spots were growing where she ahd the injections but now debbei are gone again. No itching, burning, pain on the scalp. Doesn't really feel like anything is working right now.    Talked about Tofacitinib, but were holding this because of a positive Hep C antibody. Has followed up on this and does not have hepatitis C but needs to do follow up labs to determine why she had the positive results. Planning to get labs around August or September.    Not having nay additional stresses in March really. Grandmother passed in September. Some stress  with COVID, but not too bad because she was home with her kids and didn't. Have to wear her hat or wigs at all.     Back to work now since June. Has been having headaches, which she has been having for most of her life. Just started taking sumatriptan, Zofran, and cyclobenzaprine as needed. Has had to use these only once last night. No side effects so far except fatigue.      She is hoping to know more about different options such as PRP and Tofacitinib.    Past Medical History:   Patient Active Problem List   Diagnosis     Alopecia areata     Autoimmune disease (H)     KP (keratosis pilaris)     Dermatitis, seborrheic     Skin atrophy     History reviewed. No pertinent past medical history.  History reviewed. No pertinent surgical history.    Social History:  Patient reports that she has never smoked. She has never used smokeless tobacco.    Family History:  Family History   Problem Relation Age of Onset     Cancer Paternal Grandmother         skin cancer     Skin Cancer Paternal Grandmother      Skin Cancer Maternal Grandmother        Medications:  Current Outpatient Medications   Medication Sig Dispense Refill     ALBUTEROL IN Inhale into the lungs as needed       clobetasol propionate (CLOBEX) 0.05 % external shampoo Use 3-4 times weekly 60 mL 11     cyclobenzaprine (FLEXERIL) 10 MG tablet        Multiple Vitamins-Minerals (MULTIVITAMIN ADULT PO) Take by mouth daily       ondansetron (ZOFRAN-ODT) 4 MG ODT tab TAKE 1 TABLET BY MOUTH EVERY 8 HOURS AS NEED FOR NAUSEA OR VOMITING. RELEATED TO MIGRAINE HEADACHES       Ondansetron HCl (ZOFRAN PO) Take by mouth as needed for nausea or vomiting       Prenatal MV-Min-Fe Fum-FA-DHA (PRENATAL 1 PO) Take by mouth daily       Probiotic Product (PROBIOTIC-10 PO)        promethazine (PHENERGAN) 25 MG tablet Take by mouth every 6 hours as needed for nausea       ranitidine (ZANTAC) 150 MG capsule Take by mouth 2 times daily       SUMAtriptan (IMITREX) 25 MG tablet Take 25-50 mg  by mouth       Vitamin D, Cholecalciferol, 400 units CHEW        clobetasol propionate (OLUX) 0.05 % FOAM Apply a golf ball size of product to affected areas nightly 6 to 7 days of the week (one day off) (Patient not taking: Reported on 4/27/2020) 100 g 3     Fluocinolone Acetonide Scalp (DERMA-SMOOTHE/FS SCALP) 0.01 % OIL oil 2 ml to scalp weekly, leave on for 4 hours or overnight, shampoo after application (Patient not taking: Reported on 4/27/2020) 60 mL 5     predniSONE (DELTASONE) 10 MG tablet Take 3 pills/day for 7 days, then 2/day for 7 days, then 1/day for 7 days, then 0.5/day for 7d. (Patient not taking: Reported on 4/27/2020) 46 tablet 0     predniSONE (DELTASONE) 10 MG tablet Take 1 tablet (10 mg) by mouth every 48 hours (Patient not taking: Reported on 7/13/2020) 112 tablet 0     predniSONE (DELTASONE) 5 MG tablet 3.5 tabs daily for 1 week, then 3 tabs daily for 1 week,, then 3 tabs alternating with 2 tabs every other day for 1 week, then 3 tabs alternating with 1 tab every other day, then 3 tabs alternating with 1/2 tab every other day for 1 week, then 3 tabs every other day (Patient not taking: Reported on 4/27/2020) 100 tablet 1     predniSONE (DELTASONE) 5 MG tablet Take 4 tablets (20 mg) by mouth daily (Patient not taking: Reported on 4/27/2020) 30 tablet 1     predniSONE (DELTASONE) 5 MG tablet 6 tabs in am for 5 days, then 4 tabs in am for 5 days, then 2 tabs in am for 5 days, then 1 tab daily for 5 days (Patient not taking: Reported on 4/27/2020) 100 tablet 0     triamcinolone (KENALOG) 0.1 % ointment Apply to rash on the left ring finger twice per day until resolution (Patient not taking: Reported on 4/27/2020) 30 g 1     Allergies   Allergen Reactions     Ceclor [Cefaclor]      hives     Erythromycin      hives     Relafen [Nabumetone]      hives     Rocephin [Ceftriaxone]      hives         Review of Systems:  -As per HPI  -Constitutional: Otherwise feeling well today, in usual state of  health.  -HEENT: Patient denies nonhealing oral sores.  -Skin: As above in HPI. No additional skin concerns.    Physical exam:  Vitals: There were no vitals taken for this visit.  GEN: This is a well developed, well-nourished female in no acute distress, in a pleasant mood.    SKIN: Focused examination of the scalp was performed.  - Hair pull test negative   - Large patches of loss primarily on the occipital and temporal regions; loss circumferentially around the scalp  - Patch of loss on the L frontal region   - Patch of loss on the L crown   -No other lesions of concern on areas examined.     Impression/Plan:  1. Alopecia areata  Some increased loss on exam today than previous visit. Some regrowth in areas with previous loss, but new areas and mildly more widespread loss today on exam (around 10% more loss than last visit). She is wearing a wig and hats regularly now. Had considered starting ONEIL inhibitor last visit, but found to have positive Hep C Ab (see previous notes). Did more thorough work in addition to liver US. Liver US was normal. Lab evaluation determined that she is Hep C Ab positive and HCV RNA negative. She does not have Hepatitis C, but Ab was positive likely for 1 of 3 reasons including false positive, spontaneous viral clearance, or acutely infected and not yet generated significant viremia. She will be re-testing labs in 3-6 months to distinguish between these possibilities. Finished her prednisone taper a few weeks ago, but this did not help as it has in the past. Discussed PRP today and that we could start this in the coming month.  - Continue clobetasol 0.5% shampoo 3-4 times a week  - ILK injections today with medical student and faculty. See faculty procedure note below.  - Consider ONEIL inhibitor in the future pending repeat labs; would need to work with GI to see if ONEIL inhibitor would be a good choice for her  - Consider PRP in the future; 3 treatments a month apart      - Kenalog  intralesional injection procedure note (performed by faculty): After verbal consent and discussion of risks including but not limited to atrophy, pain, and bruising,  time out was performed, 3 total cc of Kenalog 10 mg/cc was injected into 30 sites on the scalp.  The patient tolerated the procedure well and left the Dermatology clinic in good condition.          CC ESTABLISHED PATIENT  No address on file on close of this encounter.  Follow-up ideally in 6-8 weeks    Staff Involved:  IDacia, saw and examined the patient in the presence of Dr. Santoyo.    Dacia Palacio, MS4  [employer]            37

## 2024-10-21 NOTE — ED PROVIDER NOTE - NSFOLLOWUPINSTRUCTIONS_ED_ALL_ED_FT
Please see your primary care provider for followup.  Call for appointment.  If you have any problems with followup, please call the ED Referral Coordinator at [phone].  Return to the ER if symptoms worsen or other concerns.    Acute Bronchitis, Adult  A comparison between normal and swollen airways, or bronchi, in the lungs.  Acute bronchitis is sudden inflammation of the main airways (bronchi) that come off the windpipe (trachea) in the lungs. The swelling causes the airways to get smaller and make more mucus than normal. This can make it hard to breathe and can cause coughing or noisy breathing (wheezing).    Acute bronchitis may last several weeks. The cough may last longer. Allergies, asthma, and exposure to smoke may make the condition worse.    What are the causes?  This condition can be caused by germs and by substances that irritate the lungs, including:  Cold and flu viruses. The most common cause of this condition is the virus that causes the common cold.  Bacteria. This is less common.  Breathing in substances that irritate the lungs, including:  Smoke from cigarettes and other forms of tobacco.  Dust and pollen.  Fumes from household cleaning products, gases, or burned fuel.  Indoor or outdoor air pollution.  What increases the risk?  The following factors may make you more likely to develop this condition:  A weak body's defense system, also called the immune system.  A condition that affects your lungs and breathing, such as asthma.  What are the signs or symptoms?  Common symptoms of this condition include:  Coughing. This may bring up clear, yellow, or green mucus from your lungs (sputum).  Wheezing.  Runny or stuffy nose.  Having too much mucus in your lungs (chest congestion).  Shortness of breath.  Aches and pains, including sore throat or chest.  How is this diagnosed?  This condition is usually diagnosed based on:  Your symptoms and medical history.  A physical exam.  You may also have other tests, including tests to rule out other conditions, such as pneumonia. These tests include:  A test of lung function.  Test of a mucus sample to look for the presence of bacteria.  Tests to check the oxygen level in your blood.  Blood tests.  Chest X-ray.  How is this treated?  Most cases of acute bronchitis clear up over time without treatment. Your health care provider may recommend:  Drinking more fluids to help thin your mucus so it is easier to cough up.  Taking inhaled medicine (inhaler) to improve air flow in and out of your lungs.  Using a vaporizer or a humidifier. These are machines that add water to the air to help you breathe better.  Taking a medicine that thins mucus and clears congestion (expectorant).  Taking a medicine that prevents or stops coughing (cough suppressant).  It is not common to take an antibiotic medicine for this condition.    Follow these instructions at home:  A do not smoke cigarettes sign.  Take over-the-counter and prescription medicines only as told by your health care provider.  Use an inhaler, vaporizer, or humidifier as told by your health care provider.  Take two teaspoons (10 mL) of honey at bedtime to lessen coughing at night.  Drink enough fluid to keep your urine pale yellow.  Do not use any products that contain nicotine or tobacco. These products include cigarettes, chewing tobacco, and vaping devices, such as e-cigarettes. If you need help quitting, ask your health care provider.  Get plenty of rest.  Return to your normal activities as told by your health care provider. Ask your health care provider what activities are safe for you.  Keep all follow-up visits. This is important.  How is this prevented?  Washing hands with soap and water.  To lower your risk of getting this condition again:  Wash your hands often with soap and water for at least 20 seconds. If soap and water are not available, use hand .  Avoid contact with people who have cold symptoms.  Try not to touch your mouth, nose, or eyes with your hands.  Avoid breathing in smoke or chemical fumes. Breathing smoke or chemical fumes will make your condition worse.  Get the flu shot every year.  Contact a health care provider if:  Your symptoms do not improve after 2 weeks.  You have trouble coughing up the mucus.  Your cough keeps you awake at night.  You have a fever.  Get help right away if you:  Cough up blood.  Feel pain in your chest.  Have severe shortness of breath.  Faint or keep feeling like you are going to faint.  Have a severe headache.  Have a fever or chills that get worse.  These symptoms may represent a serious problem that is an emergency. Do not wait to see if the symptoms will go away. Get medical help right away. Call your local emergency services (911 in the [place] Do not drive yourself to the hospital.    Summary  Acute bronchitis is inflammation of the main airways (bronchi) that come off the windpipe (trachea) in the lungs. The swelling causes the airways to get smaller and make more mucus than normal.  Drinking more fluids can help thin your mucus so it is easier to cough up.  Take over-the-counter and prescription medicines only as told by your health care provider.  Do not use any products that contain nicotine or tobacco. These products include cigarettes, chewing tobacco, and vaping devices, such as e-cigarettes. If you need help quitting, ask your health care provider.  Contact a health care provider if your symptoms do not improve after 2 weeks.  This information is not intended to replace advice given to you by your health care provider. Make sure you discuss any questions you have with your health care provider.

## 2024-10-21 NOTE — ED ADULT NURSE NOTE - NSFALLUNIVINTERV_ED_ALL_ED
Bed/Stretcher in lowest position, wheels locked, appropriate side rails in place/Call bell, personal items and telephone in reach/Instruct patient to call for assistance before getting out of bed/chair/stretcher/Non-slip footwear applied when patient is off stretcher/[place] to call system/Physically safe environment - no spills, clutter or unnecessary equipment/Purposeful proactive rounding/Room/bathroom lighting operational, light cord in reach

## 2024-10-21 NOTE — ED PROVIDER NOTE - CLINICAL SUMMARY MEDICAL DECISION MAKING FREE TEXT BOX
patient with cough x 5 days.  otherwise non-toxic and well-appearing in no respiratory distress.  lungs clear, oxygen saturation wnl.  covid/flu/rsv testing sent and neg. cxr done showing no focal infiltrate. given diabetes, will rx zpak, likeyl acute bronchitis. return precautions discussed

## 2024-10-21 NOTE — ED PROVIDER NOTE - OBJECTIVE STATEMENT
history of DM, here with cough for past 5 days. Productive of phlegm, low grade fever, mild sore throat. Covid neg on Friday. Tried clindamycin Sat/sun without relief (had from prior sinusitis). No sick contacts known.

## 2024-10-23 DIAGNOSIS — Z88.0 ALLERGY STATUS TO PENICILLIN: ICD-10-CM

## 2024-10-23 DIAGNOSIS — Z91.09 OTHER ALLERGY STATUS, OTHER THAN TO DRUGS AND BIOLOGICAL SUBSTANCES: ICD-10-CM

## 2024-10-23 DIAGNOSIS — J20.9 ACUTE BRONCHITIS, UNSPECIFIED: ICD-10-CM

## 2024-10-23 DIAGNOSIS — E11.9 TYPE 2 DIABETES MELLITUS WITHOUT COMPLICATIONS: ICD-10-CM

## 2024-10-23 DIAGNOSIS — R05.9 COUGH, UNSPECIFIED: ICD-10-CM

## 2024-12-06 NOTE — ED PROVIDER NOTE - EYES, MLM
Health Maintenance       Hepatitis B Vaccine (1 of 3 - 19+ 3-dose series)  Never done    Shingles Vaccine (1 of 2)  Never done    Diabetes Eye Exam (Yearly)  Overdue since 6/11/2020    Pneumococcal Vaccine 0-64 (2 of 2 - PCV)  Overdue since 1/15/2021    Diabetes Foot Exam (Yearly)  Overdue since 9/16/2021    DTaP/Tdap/Td Vaccine (2 - Td or Tdap)  Overdue since 2/3/2024    Influenza Vaccine (1)  Overdue since 9/1/2024    COVID-19 Vaccine (3 - 2024-25 season)  Overdue since 9/1/2024           Following review of the above:  Patient wishes to discuss with clinician: Diabetes Eye Exam and Diabetes Foot Exam          Patient is not proceeding with: COVID-19, Dtap/Tdap/Td, Hep B, Influenza, Pneumococcal, and Shingles    Note: Refer to final orders and clinician documentation.          Recent PHQ 2/9 Score    PHQ 2:  PHQ 2 Score Adult PHQ 2 Score Adult PHQ 2 Interpretation Little interest or pleasure in activity?   12/6/2024   3:16 PM 0 No further screening needed 0       PHQ 9:         Review Flowsheet  More data exists         12/6/2024   PHQ 2/9 Score   Adult PHQ 2 Score 0   Adult PHQ 2 Interpretation No further screening needed   Little interest or pleasure in activity? Not at all   Feeling down, depressed or hopeless? Not at all      Details                    Clear bilaterally, pupils equal, round and reactive to light.

## 2024-12-24 PROBLEM — F10.90 ALCOHOL USE: Status: ACTIVE | Noted: 2019-09-05

## 2025-01-17 NOTE — ED ADULT TRIAGE NOTE - NS ED NURSE DIRECT TO ROOM YN
Lets wait to see what the zonegran level shows. Glucose at scene was in the 40s which could precipitate a seizure on its own   No

## 2025-01-25 ENCOUNTER — EMERGENCY (EMERGENCY)
Facility: HOSPITAL | Age: 50
LOS: 1 days | Discharge: HOME | End: 2025-01-25
Attending: EMERGENCY MEDICINE | Admitting: EMERGENCY MEDICINE
Payer: MEDICAID

## 2025-01-25 VITALS
TEMPERATURE: 101 F | WEIGHT: 244.93 LBS | HEIGHT: 65 IN | RESPIRATION RATE: 17 BRPM | SYSTOLIC BLOOD PRESSURE: 153 MMHG | OXYGEN SATURATION: 95 % | DIASTOLIC BLOOD PRESSURE: 78 MMHG | HEART RATE: 118 BPM

## 2025-01-25 VITALS
HEART RATE: 105 BPM | DIASTOLIC BLOOD PRESSURE: 83 MMHG | OXYGEN SATURATION: 97 % | TEMPERATURE: 99 F | SYSTOLIC BLOOD PRESSURE: 144 MMHG | RESPIRATION RATE: 18 BRPM

## 2025-01-25 DIAGNOSIS — Z90.49 ACQUIRED ABSENCE OF OTHER SPECIFIED PARTS OF DIGESTIVE TRACT: Chronic | ICD-10-CM

## 2025-01-25 LAB
FLUAV AG NPH QL: DETECTED
FLUBV AG NPH QL: SIGNIFICANT CHANGE UP
HIV 1+2 AB+HIV1 P24 AG SERPL QL IA: SIGNIFICANT CHANGE UP
RSV RNA NPH QL NAA+NON-PROBE: SIGNIFICANT CHANGE UP
SARS-COV-2 RNA SPEC QL NAA+PROBE: SIGNIFICANT CHANGE UP

## 2025-01-25 PROCEDURE — 87389 HIV-1 AG W/HIV-1&-2 AB AG IA: CPT

## 2025-01-25 PROCEDURE — 71046 X-RAY EXAM CHEST 2 VIEWS: CPT

## 2025-01-25 PROCEDURE — 71046 X-RAY EXAM CHEST 2 VIEWS: CPT | Mod: 26

## 2025-01-25 PROCEDURE — 99283 EMERGENCY DEPT VISIT LOW MDM: CPT | Mod: 25

## 2025-01-25 PROCEDURE — 87637 SARSCOV2&INF A&B&RSV AMP PRB: CPT

## 2025-01-25 PROCEDURE — 99284 EMERGENCY DEPT VISIT MOD MDM: CPT

## 2025-01-25 PROCEDURE — 36415 COLL VENOUS BLD VENIPUNCTURE: CPT

## 2025-01-25 RX ORDER — OSELTAMIVIR 75 MG/1
1 CAPSULE ORAL
Qty: 10 | Refills: 0
Start: 2025-01-25 | End: 2025-01-29

## 2025-01-25 RX ORDER — SODIUM CHLORIDE 9 MG/ML
1000 INJECTION, SOLUTION INTRAMUSCULAR; INTRAVENOUS; SUBCUTANEOUS ONCE
Refills: 0 | Status: COMPLETED | OUTPATIENT
Start: 2025-01-25 | End: 2025-01-25

## 2025-01-25 RX ORDER — ACETAMINOPHEN 80 MG/.8ML
1000 SOLUTION/ DROPS ORAL ONCE
Refills: 0 | Status: COMPLETED | OUTPATIENT
Start: 2025-01-25 | End: 2025-01-25

## 2025-01-25 RX ADMIN — SODIUM CHLORIDE 1000 MILLILITER(S): 9 INJECTION, SOLUTION INTRAMUSCULAR; INTRAVENOUS; SUBCUTANEOUS at 09:37

## 2025-01-25 RX ADMIN — ACETAMINOPHEN 1000 MILLIGRAM(S): 80 SOLUTION/ DROPS ORAL at 09:18

## 2025-01-25 NOTE — ED PROVIDER NOTE - OBJECTIVE STATEMENT
48 y/o f with PMH of DM on trulicity presents to ED with one day hx of fever, bodyaches, cough, congestion.  Pt also states she has had PNA in the past.  Denies shortness of breath.  Complains of chest pain/back pain.  Did not take meds prior to arrival.

## 2025-01-25 NOTE — ED PROVIDER NOTE - CLINICAL SUMMARY MEDICAL DECISION MAKING FREE TEXT BOX
48 y/o f with PMH of DM on trulicity presents to ED with one day hx of fever, bodyaches, cough, congestion.  Pt also states she has had PNA in the past.  Denies shortness of breath.  Complains of chest pain/back pain.  Did not take meds prior to arrival.    .5/  PE weak appearing  Xray no infiltrate  Pt with + flu  Fluids, Tylenol, agrees on tamiflu

## 2025-01-25 NOTE — ED PROVIDER NOTE - NSFOLLOWUPINSTRUCTIONS_ED_ALL_ED_FT
Stay hydrated and take Tylenol/Motrin as directed for fever and bodyaches.  Take Tamiflu as prescribed.  Get plenty of rest.  Return to ED with any worsening symptoms or other concerns.  Stay well and feel better.    Influenza, Adult  Influenza is also called the flu. It's an infection that affects your respiratory tract. This includes your nose, throat, windpipe, and lungs.    The flu is contagious. This means it spreads easily from person to person. It causes symptoms that are like a cold. It can also cause a high fever and body aches.    What are the causes?  The flu is caused by the influenza virus. You can get it by:  Breathing in droplets that are in the air after an infected person coughs or sneezes.  Touching something that has the virus on it and then touching your mouth, nose, or eyes.  What increases the risk?  You may be more likely to get the flu if:  You don't wash your hands often.  You're near a lot of people during cold and flu season.  You touch your mouth, eyes, or nose without washing your hands first.  You don't get a flu shot each year.  You may also be more at risk for the flu and serious problems, such as a lung infection called pneumonia, if:  You're older than 65.  You're pregnant.  Your immune system is weak. Your immune system is your body's defense system.  You have a long-term, or chronic, condition, such as:  Heart, kidney, or lung disease.  Diabetes.  A liver disorder.  Asthma.  You're very overweight.  You have anemia. This is when you don't have enough red blood cells in your body.  What are the signs or symptoms?  Flu symptoms often start all of a sudden. They may last 4–14 days and include:  Fever and chills.  Headaches, body aches, or muscle aches.  Sore throat.  Cough.  Runny or stuffy nose.  Discomfort in your chest.  Not wanting to eat as much as normal.  Feeling weak or tired.  Feeling dizzy.  Nausea or vomiting.  How is this diagnosed?  The flu may be diagnosed based on your symptoms and medical history. You may also have a physical exam. A swab may be taken from your nose or throat and tested for the virus.    How is this treated?  If the flu is found early, you can be treated with antiviral medicine. This may be given to you by mouth or through an IV. It can help you feel less sick and get better faster.    Taking care of yourself at home can also help your symptoms get better. Your health care provider may tell you to:  Take over-the-counter medicines.  Drink lots of fluids.  The flu often goes away on its own. If you have very bad symptoms or problems caused by the flu, you may need to be treated in a hospital.    Follow these instructions at home:  Activity    Rest as needed. Get lots of sleep.  Stay home from work or school as told by your provider.  Leave home only to go see your provider.  Do not leave home for other reasons until you don't have a fever for 24 hours without taking medicine.  Eating and drinking    Take an oral rehydration solution (ORS). This is a drink that is sold at pharmacies and stores.  Drink enough fluid to keep your pee pale yellow.  Try to drink small amounts of clear fluids. These include water, ice chips, fruit juice mixed with water, and low-calorie sports drinks.  Try to eat bland foods that are easy to digest. These include bananas, applesauce, rice, lean meats, toast, and crackers.  Avoid drinks that have a lot of sugar or caffeine in them. These include energy drinks, regular sports drinks, and soda.  Do not drink alcohol.  Do not eat spicy or fatty foods.  General instructions    A person covering their mouth and nose with a cloth while sneezing or coughing.  Washing hands with soap and water.  Take your medicines only as told by your provider.  Use a cool mist humidifier to add moisture to the air in your home. This can make it easier for you to breathe. You should also clean the humidifier every day. To do so:  Empty the water.  Pour clean water in.  Cover your mouth and nose when you cough or sneeze.  Wash your hands with soap and water often and for at least 20 seconds. It's extra important to do so after you cough or sneeze. If you can't use soap and water, use hand .  How is this prevented?  A person receiving an injection in the upper arm.  Get a flu shot every year. Ask your provider when you should get your flu shot.  Stay away from people who are sick during fall and winter. Fall and winter are cold and flu season.  Contact a health care provider if:  You get new symptoms.  You have chest pain.  You have watery poop, also called diarrhea.  You have a fever.  Your cough gets worse.  You start to have more mucus.  You feel like you may vomit, or you vomit.  Get help right away if:  You become short of breath or have trouble breathing.  Your skin or nails turn blue.  You have very bad pain or stiffness in your neck.  You get a sudden headache or pain in your face or ear.  You vomit each time you eat or drink.  These symptoms may be an emergency. Call 911 right away.  Do not wait to see if the symptoms will go away.  Do not drive yourself to the hospital.  This information is not intended to replace advice given to you by your health care provider. Make sure you discuss any questions you have with your health care provider.

## 2025-01-25 NOTE — ED PROVIDER NOTE - PHYSICAL EXAMINATION
VITAL SIGNS: I have reviewed nursing notes and confirm.  CONSTITUTIONAL: Weak appearing  SKIN: Agree with RN documentation regarding decubitus evaluation. Remainder of skin exam is warm and dry, no acute rash.  HEAD: Normocephalic; atraumatic.  EYES: PERRL, EOM intact; conjunctiva and sclera clear.  ENT: No nasal discharge; airway clear.  NECK: Supple; non tender.  CARD: tachycardic  RESP: No wheezes, rales or rhonchi.  ABD: Normal bowel sounds; soft; non-distended; non-tender; no hepatosplenomegaly.  EXT: Normal ROM. No clubbing, cyanosis or edema.  LYMPH: No acute cervical adenopathy.  NEURO: Alert, oriented. Grossly unremarkable.  PSYCH: Cooperative, appropriate.

## 2025-01-25 NOTE — ED PROVIDER NOTE - PATIENT PORTAL LINK FT
You can access the FollowMyHealth Patient Portal offered by [facility] by registering at the following website: [email or web address]. By joining [employer]’s FollowMyHealth portal, you will also be able to view your health information using other applications (apps) compatible with our system.

## 2025-01-28 DIAGNOSIS — R00.0 TACHYCARDIA, UNSPECIFIED: ICD-10-CM

## 2025-01-28 DIAGNOSIS — E11.9 TYPE 2 DIABETES MELLITUS WITHOUT COMPLICATIONS: ICD-10-CM

## 2025-01-28 DIAGNOSIS — R50.9 FEVER, UNSPECIFIED: ICD-10-CM

## 2025-01-28 DIAGNOSIS — Z91.09 OTHER ALLERGY STATUS, OTHER THAN TO DRUGS AND BIOLOGICAL SUBSTANCES: ICD-10-CM

## 2025-01-28 DIAGNOSIS — J11.1 INFLUENZA DUE TO UNIDENTIFIED INFLUENZA VIRUS WITH OTHER RESPIRATORY MANIFESTATIONS: ICD-10-CM

## 2025-01-28 DIAGNOSIS — Z88.0 ALLERGY STATUS TO PENICILLIN: ICD-10-CM

## 2025-01-28 DIAGNOSIS — Z79.85 LONG-TERM (CURRENT) USE OF INJECTABLE NON-INSULIN ANTIDIABETIC DRUGS: ICD-10-CM

## 2025-03-10 NOTE — ED PROVIDER NOTE - CONSTITUTIONAL, MLM
warm normal... Well appearing, awake, alert, oriented to person, place, time/situation and in no apparent distress.

## 2025-05-22 NOTE — ED PROVIDER NOTE - EYES, MLM
----- Message from Nurse Denisha sent at 5/21/2025  4:17 PM CDT -----  Regarding: yessenia Gilliam 05/30 @10:20  Fasting labs needed.   Clear bilaterally, pupils equal, round and reactive to light.

## 2025-07-06 NOTE — ED ADULT TRIAGE NOTE - NS ED NURSE AMBULANCES
[facility] pt transferred from [facility] for GI.  pt has c/o abd pain with n/v x 2 days.  pt was diagnosed with esophageal wall thickening.  pt BP was elevated pta and received hydralizine and zofran 8mg